# Patient Record
Sex: MALE | Race: BLACK OR AFRICAN AMERICAN | NOT HISPANIC OR LATINO | Employment: OTHER | URBAN - METROPOLITAN AREA
[De-identification: names, ages, dates, MRNs, and addresses within clinical notes are randomized per-mention and may not be internally consistent; named-entity substitution may affect disease eponyms.]

---

## 2017-04-05 ENCOUNTER — HOSPITAL ENCOUNTER (EMERGENCY)
Facility: HOSPITAL | Age: 57
Discharge: HOME | End: 2017-04-05
Attending: SURGERY
Payer: MEDICAID

## 2017-04-05 VITALS
WEIGHT: 185 LBS | BODY MASS INDEX: 27.4 KG/M2 | HEIGHT: 69 IN | RESPIRATION RATE: 18 BRPM | SYSTOLIC BLOOD PRESSURE: 170 MMHG | OXYGEN SATURATION: 98 % | DIASTOLIC BLOOD PRESSURE: 80 MMHG | HEART RATE: 70 BPM | TEMPERATURE: 98 F

## 2017-04-05 DIAGNOSIS — M25.561 RIGHT KNEE PAIN: ICD-10-CM

## 2017-04-05 LAB
ALBUMIN SERPL BCP-MCNC: 3.7 G/DL
ALP SERPL-CCNC: 123 U/L
ALT SERPL W/O P-5'-P-CCNC: 17 U/L
ANION GAP SERPL CALC-SCNC: 11 MMOL/L
AST SERPL-CCNC: 19 U/L
BASOPHILS # BLD AUTO: 0.02 K/UL
BASOPHILS NFR BLD: 0.3 %
BILIRUB SERPL-MCNC: 0.7 MG/DL
BUN SERPL-MCNC: 17 MG/DL
CALCIUM SERPL-MCNC: 8.9 MG/DL
CHLORIDE SERPL-SCNC: 103 MMOL/L
CO2 SERPL-SCNC: 23 MMOL/L
CREAT SERPL-MCNC: 1.2 MG/DL
DIFFERENTIAL METHOD: ABNORMAL
EOSINOPHIL # BLD AUTO: 0.2 K/UL
EOSINOPHIL NFR BLD: 2.7 %
ERYTHROCYTE [DISTWIDTH] IN BLOOD BY AUTOMATED COUNT: 14.9 %
EST. GFR  (AFRICAN AMERICAN): >60 ML/MIN/1.73 M^2
EST. GFR  (NON AFRICAN AMERICAN): >60 ML/MIN/1.73 M^2
GLUCOSE SERPL-MCNC: 97 MG/DL
HCT VFR BLD AUTO: 37.9 %
HGB BLD-MCNC: 12.6 G/DL
LYMPHOCYTES # BLD AUTO: 2.9 K/UL
LYMPHOCYTES NFR BLD: 41.3 %
MCH RBC QN AUTO: 28 PG
MCHC RBC AUTO-ENTMCNC: 33.2 %
MCV RBC AUTO: 84 FL
MONOCYTES # BLD AUTO: 1.1 K/UL
MONOCYTES NFR BLD: 15.2 %
NEUTROPHILS # BLD AUTO: 2.9 K/UL
NEUTROPHILS NFR BLD: 40.5 %
PLATELET # BLD AUTO: 176 K/UL
PMV BLD AUTO: 11 FL
POTASSIUM SERPL-SCNC: 4 MMOL/L
PROT SERPL-MCNC: 7.7 G/DL
RBC # BLD AUTO: 4.5 M/UL
SODIUM SERPL-SCNC: 137 MMOL/L
URATE SERPL-MCNC: 6.8 MG/DL
WBC # BLD AUTO: 7.12 K/UL

## 2017-04-05 PROCEDURE — 84550 ASSAY OF BLOOD/URIC ACID: CPT

## 2017-04-05 PROCEDURE — 99284 EMERGENCY DEPT VISIT MOD MDM: CPT | Mod: 25

## 2017-04-05 PROCEDURE — 80053 COMPREHEN METABOLIC PANEL: CPT

## 2017-04-05 PROCEDURE — 63600175 PHARM REV CODE 636 W HCPCS: Performed by: SURGERY

## 2017-04-05 PROCEDURE — 96372 THER/PROPH/DIAG INJ SC/IM: CPT

## 2017-04-05 PROCEDURE — 36415 COLL VENOUS BLD VENIPUNCTURE: CPT

## 2017-04-05 PROCEDURE — 85025 COMPLETE CBC W/AUTO DIFF WBC: CPT

## 2017-04-05 RX ORDER — KETOROLAC TROMETHAMINE 10 MG/1
5 TABLET, FILM COATED ORAL EVERY 6 HOURS PRN
Qty: 15 TABLET | Refills: 0 | Status: SHIPPED | OUTPATIENT
Start: 2017-04-05 | End: 2017-12-29 | Stop reason: ALTCHOICE

## 2017-04-05 RX ORDER — CYCLOBENZAPRINE HCL 10 MG
10 TABLET ORAL 3 TIMES DAILY PRN
Qty: 10 TABLET | Refills: 0 | Status: SHIPPED | OUTPATIENT
Start: 2017-04-05 | End: 2017-04-10

## 2017-04-05 RX ORDER — KETOROLAC TROMETHAMINE 30 MG/ML
60 INJECTION, SOLUTION INTRAMUSCULAR; INTRAVENOUS
Status: COMPLETED | OUTPATIENT
Start: 2017-04-05 | End: 2017-04-05

## 2017-04-05 RX ADMIN — KETOROLAC TROMETHAMINE 60 MG: 30 INJECTION, SOLUTION INTRAMUSCULAR at 10:04

## 2017-04-05 NOTE — ED AVS SNAPSHOT
[facility] [address] [place] [address]               Andrew Velazquez   2017  9:01 AM   ED    Description:  Male : 1960   Department:  [facility]           Your Care was Coordinated By:     Provider Role From To    Sid Springer MD Attending Provider 17 0857 --      Reason for Visit     Knee Pain     Joint Swelling           Diagnoses this Visit        Comments    Right knee pain           ED Disposition     ED Disposition Condition Comment    Discharge             To Do List           Follow-up Information     Follow up with Marisol Tejada MD. Schedule an appointment as soon as possible for a visit in 2 days.    Specialties:  Infectious Diseases, Obstetrics and Gynecology    Contact information:     [employer] [place] [address]  [phone]         These Medications        Disp Refills Start End    ketorolac (TORADOL) 10 mg tablet 15 tablet 0 2017     Take 0.5 tablets (5 mg total) by mouth every 6 (six) hours as needed for Pain. - Oral    Pharmacy: [facility] [address] Ph #: [phone]       cyclobenzaprine (FLEXERIL) 10 MG tablet 10 tablet 0 2017 4/10/2017    Take 1 tablet (10 mg total) by mouth 3 (three) times daily as needed for Muscle spasms. - Oral    Pharmacy: [facility] [address] Ph #: [phone]         [facility]     [facility] -  Assistance  Unless otherwise directed by your provider, please contact [facility], our nurse care line that is available for 24 assistance.     Registered nurses in the [facility] provide: appointment scheduling, clinical advisement, health education, and other advisory services.  Call: [phone] (toll free)               Medications           Message regarding Medications     Verify the changes  and/or additions to your medication regime listed below are the same as discussed with your clinician today.  If any of these changes or additions are incorrect, please notify your healthcare provider.        START taking these NEW medications        Refills    ketorolac (TORADOL) 10 mg tablet 0    Sig: Take 0.5 tablets (5 mg total) by mouth every 6 (six) hours as needed for Pain.    Class: Normal    Route: Oral    cyclobenzaprine (FLEXERIL) 10 MG tablet 0    Sig: Take 1 tablet (10 mg total) by mouth 3 (three) times daily as needed for Muscle spasms.    Class: Normal    Route: Oral      These medications were administered today        Dose Freq    ketorolac injection 60 mg 60 mg ED 1 Time    Sig: Inject 2 mLs (60 mg total) into the muscle ED 1 Time.    Class: Normal    Route: Intramuscular           Verify that the below list of medications is an accurate representation of the medications you are currently taking.  If none reported, the list may be blank. If incorrect, please contact your healthcare provider. Carry this list with you in case of emergency.           Current Medications     abacavir-dolutegravir-lamivud 600- mg Tab Take 1 tablet by mouth once daily. Stop norvir, ziagen, viread and reyataz    amlodipine (NORVASC) 10 MG tablet Take 1 tablet (10 mg total) by mouth once daily.    aspirin (ECOTRIN) 81 MG EC tablet Take 81 mg by mouth once daily.    b complex vitamins tablet Take 1 tablet by mouth once daily.    cyclobenzaprine (FLEXERIL) 10 MG tablet Take 1 tablet (10 mg total) by mouth 3 (three) times daily as needed for Muscle spasms.    ferrous sulfate 325 mg (65 mg iron) Tab tablet Take 325 mg by mouth once daily.    fluocinonide 0.05% (LIDEX) 0.05 % cream Apply 1 application topically 2 (two) times daily.    gabapentin (NEURONTIN) 300 MG capsule Take 1 capsule (300 mg total) by mouth 3 (three) times daily. Needs follow up appointment    hydrocodone-acetaminophen 7.5-325mg (NORCO) 7.5-325 mg per  "tablet Take 1 tablet by mouth every 6 (six) hours as needed. Do not fill until 1/28/167    ketorolac (TORADOL) 10 mg tablet Take 0.5 tablets (5 mg total) by mouth every 6 (six) hours as needed for Pain.    losartan-hydrochlorothiazide 50-12.5 mg (HYZAAR) 50-12.5 mg per tablet Take 1 tablet by mouth once daily. Stop HCTZ 12.5 mg    metoprolol succinate (TOPROL-XL) 50 MG 24 hr tablet Take 1 tablet (50 mg total) by mouth once daily.    pravastatin (PRAVACHOL) 40 MG tablet Take 1 tablet (40 mg total) by mouth every evening.    sulfamethoxazole-trimethoprim 800-160mg (BACTRIM DS) 800-160 mg Tab Take 1 tablet by mouth once daily.    triamcinolone acetonide 0.1% (KENALOG) 0.1 % cream Apply topically 2 (two) times daily.    zolpidem (AMBIEN) 10 mg Tab Take 1 tablet (10 mg total) by mouth nightly.           Clinical Reference Information           Your Vitals Were     BP Pulse Temp Resp Height Weight    185/100 (BP Location: Left arm, Patient Position: Sitting) 70 98 °F (36.7 °C) (Oral) 18 5' 9" (1.753 m) 83.9 kg (185 lb)    SpO2 BMI             99% 27.32 kg/m2         Allergies as of 4/5/2017        Reactions    Penicillins Hives    Sustiva [Efavirenz] Other (See Comments)    insomnia      Immunizations Administered on Date of Encounter - 4/5/2017     None      ED Micro, Lab, POCT     Start Ordered       Status Ordering Provider    04/05/17 0902 04/05/17 0901  Comprehensive metabolic panel  STAT      Final result     04/05/17 0902 04/05/17 0901  CBC auto differential  STAT      Final result     04/05/17 0902 04/05/17 0901  Uric acid  STAT      Final result       ED Imaging Orders     Start Ordered       Status Ordering Provider    04/05/17 0902 04/05/17 0901  X-Ray Knee 1 or 2 View Right  1 time imaging      Final result         Discharge Instructions         Knee Pain  Knee pain is very common. Its especially common in active people who put a lot of pressure on their knees, like runners. It affects women more often than " men.  Your kneecap (patella) is a thick, round bone. It covers and protects the front portion of your knee joint. It moves along a groove in your thighbone (femur) as part of the patellofemoral joint. A layer of cartilage surrounds the underside of your kneecap. This layer protects it from grinding against your femur.  When this cartilage softens and breaks down, it can cause knee pain. This is partly because of repetitive stress. The stress irritates the lining of the joint. This causes pain in the underlying bone.  What causes knee pain?  Many things can cause knee pain. You may have more than one cause. Some of these include:  · Overuse of the knee joint  · The kneecap doesnt line up with the tissue around it  · Damage to small nerves in the area  · Damage to the ligament-like structure that holds the kneecap in place (retinaculum)  · Breakdown of the bone under the cartilage  · Swelling in the soft tissues around the kneecap  · Injury  You might be more likely to have knee pain if you:  · Exercise a lot  · Recently increased the intensity of your workouts  · Have a body mass index (BMI) greater than 25  · Have poor alignment of your kneecap  · Walk with your feet turned overly outward or inward  · Have weakness in surrounding muscle groups (inner quad or hip adductor muscles)  · Have too much tightness in surrounding muscle groups (hamstrings or iliotibial band)  · Have a recent history of injury to the area  · Are female  Symptoms of knee pain  This type of knee pain is a dull, aching pain in the front of the knee in the area under and around the kneecap. This pain may start quickly or slowly. Your pain might be worse when you squat, run, or sit for a long time. You might also sometimes feel like your knee is giving out. You may have symptoms in one or both of your knees.  Diagnosing knee pain  Your health care provider will ask about your medical history and your symptoms. Be sure to describe any activities  that make your knee pain worse. He or she will look at your knee. This will include tests of your range of motion, strength, and areas of pain of your knee. Your knee alignment will be checked.  Your health care provider will need to rule out other causes of your knee pain, such as arthritis. You may need an imaging test, such as an X-ray or MRI.  Treatment for knee pain  Treatments that can help ease your symptoms may include:  · Avoiding activities for a while that make your pain worse, returning to activity over time  · Icing the outside of your knee when it causes you pain  · Taking over-the-counter pain medicine  · Wearing a knee brace or taping your knee to support it  · Wearing special shoe inserts to help keep your feet in the proper alignment  · Doing special exercises to stretch and strengthen the muscles around your hip and your knee  These steps help most people manage knee pain. But some cases of knee pain need to be treated with surgery. You may need surgery right away. Or you may need it later if other treatments dont work. Your health care provider may refer you to an orthopedic surgeon. He or she will talk with you about your choices.  Preventing knee pain  Losing weight and correcting excess muscle tightness or muscle weakness may help lower your risk.  In some cases, you can prevent knee pain. To help prevent a flare-up of knee pain, you do these things:  · Regularly do all the exercises your doctor or physical therapist advises  · Support your knee as advised by your doctor or physical therapist  · Increase training gradually, and ease up on training when needed  · Have an expert check your gait for running or other sporting activities  · Stretch properly before and after exercise  · Replace your running shoes regularly  · Lose excess weight     When to call your health care provider  Call your health care provider right away if:  · Your symptoms dont get better after a few weeks of  treatment  · You have any new symptoms   Date Last Reviewed: 3/19/2015  © 7396-7896 [employer]. [address] [place] [address], [place] [address]. All rights reserved. This information is not intended as a substitute for professional medical care. Always follow your healthcare professional's instructions.          Your Scheduled Appointments     May 04, 2017  6:50 AM CDT   Fasting Lab with [facility] [address] [place] [address]   [phone]            May 11, 2017  1:30 PM CDT   Established Patient Visit with MD KIANNA Ramirez - Infectious Diseases ([facility])    [address] [place] [address]   [phone]            Yon 15, 2017  7:25 AM CDT   Fasting Lab with [facility] [address] [place] [address]   [phone]            Jun 29, 2017 10:30 AM CDT   Established Patient Visit with MD KIANAN Ramirez - Infectious Diseases ([facility])    [address] [place] [address]   [phone]              Smoking Cessation     If you would like to quit smoking:   You may be eligible for free services if you are a [place] resident and started smoking cigarettes before September 1, 1988.  Call the Smoking Cessation Trust ([facility]) toll free at (157) 387-0740 or (643) 996-2268.   Call 1-800-QUIT-NOW if you do not meet the above criteria.   Contact us via email: [email or web address]   View our website for more information: [email or web address]         [facility] complies with applicable Federal civil rights laws and does not discriminate on the basis of race, color, national origin, age, disability, or sex.        Language Assistance Services     ATTENTION: Language assistance services are available, free of charge. Please call [phone].       ATENCIÓN: Si habla español, tiene a jackson disposición servicios gratuitos de asistencia lingüística. Llame al [phone].     CHÚ Ý: N?u b?n nói Ti?ng Vi?t, có các d?ch v? h? tr? ngôn ng? mi?n phí dành cho b?n. G?i s? [phone].

## 2017-04-05 NOTE — ED TRIAGE NOTES
Patient presents to the ER with c/o right knee pain and swelling which started yesterday.  Patient reports that he is having trouble bending his knee.

## 2017-04-05 NOTE — DISCHARGE INSTRUCTIONS
Knee Pain  Knee pain is very common. Its especially common in active people who put a lot of pressure on their knees, like runners. It affects women more often than men.  Your kneecap (patella) is a thick, round bone. It covers and protects the front portion of your knee joint. It moves along a groove in your thighbone (femur) as part of the patellofemoral joint. A layer of cartilage surrounds the underside of your kneecap. This layer protects it from grinding against your femur.  When this cartilage softens and breaks down, it can cause knee pain. This is partly because of repetitive stress. The stress irritates the lining of the joint. This causes pain in the underlying bone.  What causes knee pain?  Many things can cause knee pain. You may have more than one cause. Some of these include:  · Overuse of the knee joint  · The kneecap doesnt line up with the tissue around it  · Damage to small nerves in the area  · Damage to the ligament-like structure that holds the kneecap in place (retinaculum)  · Breakdown of the bone under the cartilage  · Swelling in the soft tissues around the kneecap  · Injury  You might be more likely to have knee pain if you:  · Exercise a lot  · Recently increased the intensity of your workouts  · Have a body mass index (BMI) greater than 25  · Have poor alignment of your kneecap  · Walk with your feet turned overly outward or inward  · Have weakness in surrounding muscle groups (inner quad or hip adductor muscles)  · Have too much tightness in surrounding muscle groups (hamstrings or iliotibial band)  · Have a recent history of injury to the area  · Are female  Symptoms of knee pain  This type of knee pain is a dull, aching pain in the front of the knee in the area under and around the kneecap. This pain may start quickly or slowly. Your pain might be worse when you squat, run, or sit for a long time. You might also sometimes feel like your knee is giving out. You may have symptoms in  one or both of your knees.  Diagnosing knee pain  Your health care provider will ask about your medical history and your symptoms. Be sure to describe any activities that make your knee pain worse. He or she will look at your knee. This will include tests of your range of motion, strength, and areas of pain of your knee. Your knee alignment will be checked.  Your health care provider will need to rule out other causes of your knee pain, such as arthritis. You may need an imaging test, such as an X-ray or MRI.  Treatment for knee pain  Treatments that can help ease your symptoms may include:  · Avoiding activities for a while that make your pain worse, returning to activity over time  · Icing the outside of your knee when it causes you pain  · Taking over-the-counter pain medicine  · Wearing a knee brace or taping your knee to support it  · Wearing special shoe inserts to help keep your feet in the proper alignment  · Doing special exercises to stretch and strengthen the muscles around your hip and your knee  These steps help most people manage knee pain. But some cases of knee pain need to be treated with surgery. You may need surgery right away. Or you may need it later if other treatments dont work. Your health care provider may refer you to an orthopedic surgeon. He or she will talk with you about your choices.  Preventing knee pain  Losing weight and correcting excess muscle tightness or muscle weakness may help lower your risk.  In some cases, you can prevent knee pain. To help prevent a flare-up of knee pain, you do these things:  · Regularly do all the exercises your doctor or physical therapist advises  · Support your knee as advised by your doctor or physical therapist  · Increase training gradually, and ease up on training when needed  · Have an expert check your gait for running or other sporting activities  · Stretch properly before and after exercise  · Replace your running shoes regularly  · Lose  excess weight     When to call your health care provider  Call your health care provider right away if:  · Your symptoms dont get better after a few weeks of treatment  · You have any new symptoms   Date Last Reviewed: 3/19/2015  © 3210-3021 [employer]. [address] [place] [address], [place] [address]. All rights reserved. This information is not intended as a substitute for professional medical care. Always follow your healthcare professional's instructions.

## 2017-04-05 NOTE — ED PROVIDER NOTES
[facility]                                        April 5, 2017                   Chief Complaint  56 y.o. male with Knee Pain (right) and Joint Swelling (right knee)    History of Present Illness  Andrew Velazquez presents to the emergency room with right knee pain today  Patient has long history of right knee pain with arthritis, no acute injury recently  Patient denies slip and fall, denies any right knee trauma, chronic pain daily  Patient on exam is arthritic changes the right knee with no gross deformity   Patient has no swelling or bruising, negative Homans sign on evaluation today  Patient has good distal pulses and capillary refill, is neurovascular intact here  His only complaint is right knee pain, denies any history of gout or RA on ROS    The history is provided by the patient    Past Medical History   -- Arthritis    -- Back pain    -- Chronic kidney disease    -- COPD (chronic obstructive pulmonary disease)    -- Esophageal reflux    -- HIV (human immunodeficiency virus infection)    -- HIV infection    -- Hypertension    -- Lumbago    -- Psoriasis    -- Skin disorder    -- Tobacco use      Surgical history: Finger surgery and tonsillectomy  ALLERGIES: Penicillin and Sustiva    Review of Systems and Physical Exam     Review of Systems  -- Constitution - no fever, denies fatigue, no weakness, no chills  -- Eyes - no tearing or redness, no visual disturbance  -- Ear, Nose - no tinnitus or earache, no nasal congestion or discharge  -- Mouth,Throat - no sore throat, no toothache, normal voice, normal swallowing  -- Respiratory - denies cough and congestion, no shortness of breath, no PAYNE  -- Cardiovascular - denies chest pain, no palpitations, denies claudication  -- Gastrointestinal - denies abdominal pain, nausea, vomiting, or diarrhea  -- Musculoskeletal - chronic right knee pain  -- Neurological - no headache, denies weakness or seizure; no LOC  -- Skin - denies pallor, rash, or  changes in skin. no hives or welts noted    Vital Signs  -- His blood pressure is 170/80 (abnormal) and his pulse is 70.   -- His respiration is 18 and oxygen saturation is 98%.      Physical Exam  -- Nursing note and vitals reviewed  -- Head: Atraumatic. Normocephalic. No obvious abnormality  -- Eyes: Pupils are equal and reactive to light. Normal conjunctiva and lids  -- Cardiac: Normal rate, regular rhythm and normal heart sounds  -- Pulmonary: Normal respiratory effort, breath sounds clear to auscultation  -- Abdominal: Soft, no tenderness. Normal bowel sounds. Normal liver edge  -- Musculoskeletal: Arthritic changes of the right knee with no gross deformity  -- Neurological: No focal deficits. Showed good interaction with staff  -- Vascular: Posterior tibial, dorsalis pedis and radial pulses 2+ bilaterally      Emergency Room Course     Treatment and Evaluation  -- The electrolytes drawn in the ER today were within normal limits   -- The CBC and uric acid drawn in the ER today was within normal limits   -- Degenerative changes noted on right knee x-ray, no acute fracture  -- IM 60 mg Toradol given today in the ER    Diagnosis  -- The encounter diagnosis was Right knee pain.    Disposition and Plan  -- Disposition: home  -- Condition: stable  -- Follow-up: Patient to follow up with Marisol Tejada MD in 1-2 days.  -- I advised the patient that we have found no life threatening condition today  -- At this time, I believe the patient is clinically stable for discharge.   -- The patient acknowledges that close follow up with a MD is required   -- Patient agrees to comply with all instruction and direction given in the ER    This note is dictated on Dragon Natural Speaking word recognition program.  There are word recognition mistakes that are occasionally missed on review.           Sid Springer MD  04/05/17 5612

## 2017-08-31 ENCOUNTER — HOSPITAL ENCOUNTER (EMERGENCY)
Facility: HOSPITAL | Age: 57
Discharge: HOME | End: 2017-08-31
Attending: EMERGENCY MEDICINE
Payer: MEDICAID

## 2017-08-31 VITALS
TEMPERATURE: 97 F | DIASTOLIC BLOOD PRESSURE: 106 MMHG | HEART RATE: 83 BPM | SYSTOLIC BLOOD PRESSURE: 171 MMHG | WEIGHT: 175 LBS | BODY MASS INDEX: 25.84 KG/M2 | RESPIRATION RATE: 18 BRPM

## 2017-08-31 DIAGNOSIS — J40 BRONCHITIS: Primary | ICD-10-CM

## 2017-08-31 PROCEDURE — 99283 EMERGENCY DEPT VISIT LOW MDM: CPT

## 2017-08-31 RX ORDER — PROMETHAZINE HYDROCHLORIDE AND CODEINE PHOSPHATE 6.25; 1 MG/5ML; MG/5ML
5 SOLUTION ORAL EVERY 4 HOURS PRN
Qty: 100 ML | Refills: 0 | Status: SHIPPED | OUTPATIENT
Start: 2017-08-31 | End: 2017-09-10

## 2017-08-31 RX ORDER — AZITHROMYCIN 250 MG/1
TABLET, FILM COATED ORAL
Qty: 6 TABLET | Refills: 0 | Status: SHIPPED | OUTPATIENT
Start: 2017-08-31 | End: 2017-09-22

## 2017-08-31 NOTE — ED PROVIDER NOTES
Encounter Date: 8/31/2017       History     Chief Complaint   Patient presents with    Cough     The history is provided by the patient.   Cough   This is a new problem. The current episode started several days ago. The problem has been unchanged. The cough is productive of sputum. There has been no fever. Pertinent negatives include no chest pain, no chills, no sweats, no weight loss, no ear congestion, no ear pain, no headaches, no rhinorrhea, no myalgias, no shortness of breath, no wheezing and no eye redness. He has tried cough syrup for the symptoms. He is a smoker. His past medical history is significant for COPD.     Review of patient's allergies indicates:   Allergen Reactions    Penicillins Hives    Sustiva [efavirenz] Other (See Comments)     insomnia     Past Medical History:   Diagnosis Date    Arthritis     Back pain     Chronic kidney disease     stage 3    COPD (chronic obstructive pulmonary disease)     Esophageal reflux     HIV (human immunodeficiency virus infection)     2006    HIV infection     Hypertension     Lumbago     Psoriasis     Skin disorder     Tobacco use      Past Surgical History:   Procedure Laterality Date    FINGER SURGERY      TONSILLECTOMY       Family History   Problem Relation Age of Onset    Arthritis Mother     Hypertension Mother     Hypertension Sister     Arthritis Sister     No Known Problems Father      Social History   Substance Use Topics    Smoking status: Current Every Day Smoker     Packs/day: 0.25     Years: 40.00     Types: Cigarettes    Smokeless tobacco: Never Used    Alcohol use 0.0 oz/week      Comment: occassional beer; pt drank beer this am     Review of Systems   Constitutional: Negative for chills, fever and weight loss.   HENT: Negative for ear pain and rhinorrhea.    Eyes: Negative for pain, discharge, redness and itching.   Respiratory: Positive for cough. Negative for shortness of breath and wheezing.    Cardiovascular:  Negative for chest pain, palpitations and leg swelling.   Gastrointestinal: Negative for abdominal pain, nausea and vomiting.   Genitourinary: Negative for flank pain and frequency.   Musculoskeletal: Negative for myalgias, neck pain and neck stiffness.   Skin: Negative for color change, pallor, rash and wound.   Neurological: Negative for tremors, syncope, weakness and headaches.       Physical Exam     Initial Vitals [08/31/17 0452]   BP Pulse Resp Temp SpO2   (!) 171/106 83 18 97.2 °F (36.2 °C) --      MAP       127.67         Physical Exam    Nursing note and vitals reviewed.  Constitutional: He appears well-developed and well-nourished. He is not diaphoretic. No distress.   HENT:   Head: Normocephalic and atraumatic.   Mouth/Throat: No oropharyngeal exudate.   Eyes: EOM are normal. Pupils are equal, round, and reactive to light. Right eye exhibits no discharge. Left eye exhibits no discharge.   Neck: Normal range of motion. Neck supple. No JVD present.   Pulmonary/Chest: Breath sounds normal. No stridor. No respiratory distress. He has no wheezes. He has no rhonchi. He has no rales.   Abdominal: Soft. Bowel sounds are normal. He exhibits no distension. There is no tenderness. There is no rebound and no guarding.   Musculoskeletal: Normal range of motion. He exhibits no edema or tenderness.   Neurological: He is alert and oriented to person, place, and time. No sensory deficit.         ED Course   Procedures  Labs Reviewed - No data to display                            ED Course     Clinical Impression:   The encounter diagnosis was Bronchitis.    Disposition:   Disposition: Discharged  Condition: Stable                        Dawood Martinez MD  08/31/17 3432

## 2017-09-22 ENCOUNTER — HOSPITAL ENCOUNTER (EMERGENCY)
Facility: HOSPITAL | Age: 57
Discharge: HOME | End: 2017-09-22
Attending: EMERGENCY MEDICINE
Payer: MEDICAID

## 2017-09-22 VITALS
SYSTOLIC BLOOD PRESSURE: 155 MMHG | BODY MASS INDEX: 23.63 KG/M2 | WEIGHT: 160 LBS | RESPIRATION RATE: 18 BRPM | DIASTOLIC BLOOD PRESSURE: 80 MMHG | OXYGEN SATURATION: 98 % | TEMPERATURE: 98 F | HEART RATE: 70 BPM

## 2017-09-22 DIAGNOSIS — R05.9 COUGH: ICD-10-CM

## 2017-09-22 DIAGNOSIS — J40 BRONCHITIS: Primary | ICD-10-CM

## 2017-09-22 PROCEDURE — 99283 EMERGENCY DEPT VISIT LOW MDM: CPT

## 2017-09-22 RX ORDER — HYDROCODONE BITARTRATE AND HOMATROPINE METHYLBROMIDE ORAL SOLUTION 5; 1.5 MG/5ML; MG/5ML
5 LIQUID ORAL EVERY 4 HOURS PRN
Qty: 120 ML | Refills: 0 | Status: SHIPPED | OUTPATIENT
Start: 2017-09-22 | End: 2017-10-02

## 2017-09-22 RX ORDER — AZITHROMYCIN 250 MG/1
TABLET, FILM COATED ORAL
Qty: 6 TABLET | Refills: 0 | Status: SHIPPED | OUTPATIENT
Start: 2017-09-22 | End: 2017-09-29 | Stop reason: ALTCHOICE

## 2017-09-22 NOTE — DISCHARGE INSTRUCTIONS
Return to ED if worsening symptoms, chest pain, shortness of breath, fever, chills, coughing up blood or other concerns.

## 2017-09-22 NOTE — ED NOTES
Received verbal report from VIKASH Echeverria.  Pt in ED room ED 06/ED 06 lying in stretcher, HOB 30 degrees. Stretcher is in low, locked position, side rails up x2. The patient is awake, alert and cooperative with a calm affect, patient is aware of environment. Airway is open and patent, respirations are spontaneous, normal respiratory effort and rate noted, skin warm and dry, full ROM in all extremities, appearance: NAD noted, resting comfortably.Call bell within reach of pt, pt instructed on use, pt verbalizes understanding of call bell use. Hourly rounding explained and white board updated.

## 2017-09-22 NOTE — ED PROVIDER NOTES
Encounter Date: 9/22/2017       History     Chief Complaint   Patient presents with    Cough     This is a 56-year-old male who presents with cough for the past few days.  He denies fever or chills or shortness of breath.  He reports occasional chest pain with his cough only.          Review of patient's allergies indicates:   Allergen Reactions    Penicillins Hives    Sustiva [efavirenz] Other (See Comments)     insomnia     Past Medical History:   Diagnosis Date    Arthritis     Back pain     Chronic kidney disease     stage 3    COPD (chronic obstructive pulmonary disease)     Esophageal reflux     HIV (human immunodeficiency virus infection)     2006    HIV infection     Hypertension     Lumbago     Psoriasis     Skin disorder     Tobacco use      Past Surgical History:   Procedure Laterality Date    FINGER SURGERY      TONSILLECTOMY       Family History   Problem Relation Age of Onset    Arthritis Mother     Hypertension Mother     Hypertension Sister     Arthritis Sister     No Known Problems Father      Social History   Substance Use Topics    Smoking status: Current Every Day Smoker     Packs/day: 0.25     Years: 40.00     Types: Cigarettes    Smokeless tobacco: Never Used    Alcohol use 0.0 oz/week      Comment: occassional beer; pt drank beer this am     Review of Systems   All other systems reviewed and are negative.      Physical Exam     Initial Vitals [09/22/17 1010]   BP Pulse Resp Temp SpO2   (!) 164/90 74 18 98.1 °F (36.7 °C) --      MAP       114.67         Physical Exam    Nursing note and vitals reviewed.  Constitutional: He appears well-developed and well-nourished. He is not diaphoretic. No distress.   HENT:   Head: Normocephalic.   Right Ear: External ear normal.   Left Ear: External ear normal.   Nose: Nose normal.   Mouth/Throat: Oropharynx is clear and moist.   Eyes: Conjunctivae and EOM are normal. Pupils are equal, round, and reactive to light. Right eye exhibits  no discharge. Left eye exhibits no discharge.   Neck: Normal range of motion. Neck supple.   Cardiovascular: Normal rate, regular rhythm, normal heart sounds and intact distal pulses.   Pulmonary/Chest: Breath sounds normal. No respiratory distress. He has no wheezes. He has no rales.   Abdominal: Soft. Bowel sounds are normal. He exhibits no distension and no mass. There is no tenderness. There is no rebound and no guarding.   Musculoskeletal: Normal range of motion.   Neurological: He is alert and oriented to person, place, and time.   Skin: Skin is warm.   Psychiatric: He has a normal mood and affect. His behavior is normal. Thought content normal.         ED Course   Procedures  Labs Reviewed - No data to display       X-Rays:   Independently Interpreted Readings:   Chest X-Ray: Normal heart size.  No infiltrates.  No acute abnormalities.     Medical Decision Making:   Initial Assessment:   This is a 56-year-old male with cough and congestion for the past 2 days.  There is no evidence of pneumonia on his chest x-ray.  He is well-appearing and nontoxic.  I'll treat him with Zithromax and Hycodan.                   ED Course      Clinical Impression:   The primary encounter diagnosis was Bronchitis. A diagnosis of Cough was also pertinent to this visit.                           Carlos Gomez MD  09/22/17 0348

## 2018-03-04 ENCOUNTER — HOSPITAL ENCOUNTER (EMERGENCY)
Facility: HOSPITAL | Age: 58
Discharge: HOME | End: 2018-03-04
Attending: EMERGENCY MEDICINE
Payer: MEDICAID

## 2018-03-04 VITALS
SYSTOLIC BLOOD PRESSURE: 178 MMHG | OXYGEN SATURATION: 97 % | TEMPERATURE: 98 F | RESPIRATION RATE: 16 BRPM | HEART RATE: 64 BPM | DIASTOLIC BLOOD PRESSURE: 97 MMHG | WEIGHT: 180 LBS | BODY MASS INDEX: 26.58 KG/M2

## 2018-03-04 DIAGNOSIS — M65.342 TRIGGER RING FINGER OF LEFT HAND: Primary | ICD-10-CM

## 2018-03-04 PROCEDURE — 99282 EMERGENCY DEPT VISIT SF MDM: CPT

## 2018-03-04 NOTE — ED PROVIDER NOTES
"[facility]                                                  Chief Complaint  57 y.o. male with General Illness (lt 4th finger "locks up.")    History of Present Illness  Andrew Velazquez presents to the emergency room with complaints that his left fourth finger is occasionally "locking up ".  Patient reports he had no trauma.  He reports been doing this for several days.  Has not noticed any symptoms before then.  He does not have any neurologic symptoms.  He has not tried any medication for symptoms.      Past Medical History:   Diagnosis Date    Arthritis     Back pain     Chronic kidney disease     stage 3    COPD (chronic obstructive pulmonary disease)     Esophageal reflux     HIV (human immunodeficiency virus infection)     2006    HIV infection     Hypertension     Lumbago     Psoriasis     Skin disorder     Tobacco use      Past Surgical History:   Procedure Laterality Date    FINGER SURGERY      TONSILLECTOMY        Review of patient's allergies indicates:   Allergen Reactions    Penicillins Hives    Sustiva [efavirenz] Other (See Comments)     insomnia        Review of Systems and Physical Exam     Review of Systems  -- Constitution - no fever, denies fatigue, no weakness, no chills  -- Eyes - no tearing or redness, no visual disturbance  -- Ear, Nose - no tinnitus or earache, no nasal congestion or discharge  -- Mouth,Throat - no sore throat, no toothache, normal voice, normal swallowing  -- Respiratory - denies cough and congestion, no shortness of breath, no wheezing  -- Cardiovascular - denies chest pain, no palpitations, denies claudication  -- Gastrointestinal - denies abdominal pain, nausea, vomiting, or diarrhea  -- Genitourinary - no dysuria, no denies flank pain, no hematuria or frequency   -- Musculoskeletal - denies back pain, negative for myalgias and arthralgias  reports fourth left finger locking up  -- Neurological - no headache, denies weakness or " seizure; no LOC  -- Skin - denies pallor, rash, or changes in skin. no hives or welts noted    Vital Signs   weight is 81.6 kg (180 lb). His oral temperature is 98 °F (36.7 °C). His blood pressure is 178/97 (abnormal) and his pulse is 64. His respiration is 16 and oxygen saturation is 97%.      Physical Exam  -- Nursing note and vitals reviewed  -- Constitutional: Appears well-developed and well-nourished  -- Head: Atraumatic. Normocephalic. No obvious abnormality  -- Eyes: Pupils are equal and reactive to light. Normal conjunctiva and lids  -- Nose: Nose normal in appearance, nares grossly normal. No discharge  -- Throat: Mucous membranes moist, pharynx normal, normal tonsils. No lesions   -- Ears: External ears and TM normal bilaterally. Normal hearing and no drainage  -- Neck: Normal range of motion. Neck supple. No masses, trachea midline  -- Cardiac: Normal rate, regular rhythm and normal heart sounds  -- Pulmonary: Normal respiratory effort, breath sounds clear to auscultation  -- Abdominal: Soft, no tenderness. Normal bowel sounds. Normal liver edge  -- Musculoskeletal: Normal range of motion, no effusions. Joints stable left fourth digit consistent with trigger finger, palmar palpable mass  -- Neurological: No focal deficits. Showed good interaction with staff  -- Vascular: Posterior tibial, dorsalis pedis and radial pulses 2+ bilaterally    -- Lymphatics: No cervical or peripheral lymphadenopathy. No edema noted  -- Skin: Warm and dry. No evidence of rash or cellulitis  -- Psychiatric: Normal mood and affect. Bedside behavior is appropriate    Emergency Room Course     Treatment and Evaluation  1.  Physical exam consistent with trigger finger  2.  Follow up care first available         Diagnosis  -- Fourth digit trigger finger    Disposition and Plan  -- Disposition: home  -- Condition: stable  -- Follow-up: Patient to follow up with Marisol Tejada MD in 1-2 days.  -- I advised the patient that we have  found no life threatening condition today  -- At this time, I believe the patient is clinically stable for discharge.   -- The patient acknowledges that close follow up with a MD is required   -- Patient agrees to comply with all instruction and direction given in the ER  -- Patient counseled on strict return precautions as discussed       Mica Rojas MD  03/04/18 0953

## 2018-03-04 NOTE — ED TRIAGE NOTES
"States his lt 4th finger began "locking up" this week.  Denies any trauma.  No deformity noted.   "

## 2018-05-06 ENCOUNTER — HOSPITAL ENCOUNTER (EMERGENCY)
Facility: HOSPITAL | Age: 58
Discharge: HOME | End: 2018-05-06
Payer: MEDICAID

## 2018-05-06 VITALS
RESPIRATION RATE: 18 BRPM | BODY MASS INDEX: 27.32 KG/M2 | HEART RATE: 70 BPM | TEMPERATURE: 97 F | DIASTOLIC BLOOD PRESSURE: 103 MMHG | WEIGHT: 185 LBS | SYSTOLIC BLOOD PRESSURE: 176 MMHG

## 2018-05-06 DIAGNOSIS — M65.342 TRIGGER FINGER, LEFT RING FINGER: Primary | ICD-10-CM

## 2018-05-06 PROCEDURE — 25000003 PHARM REV CODE 250: Performed by: SURGERY

## 2018-05-06 PROCEDURE — 99283 EMERGENCY DEPT VISIT LOW MDM: CPT

## 2018-05-06 RX ORDER — TRAMADOL HYDROCHLORIDE 50 MG/1
50 TABLET ORAL
Status: COMPLETED | OUTPATIENT
Start: 2018-05-06 | End: 2018-05-06

## 2018-05-06 RX ORDER — IBUPROFEN 800 MG/1
800 TABLET ORAL
Status: COMPLETED | OUTPATIENT
Start: 2018-05-06 | End: 2018-05-06

## 2018-05-06 RX ADMIN — TRAMADOL HYDROCHLORIDE 50 MG: 50 TABLET, FILM COATED ORAL at 09:05

## 2018-05-06 RX ADMIN — IBUPROFEN 800 MG: 800 TABLET ORAL at 09:05

## 2018-05-06 NOTE — ED PROVIDER NOTES
Encounter Date: 5/6/2018       History     Chief Complaint   Patient presents with    Hand Pain     Patient is a 57-year-old black male with previous history of left trigger finger.  He was seen by primary care in follow-up after his last ED visit for this, apparently was referred to undergo surgical release and direction, but failed to pursue this.  Last night he had recurrence of locking up of the finger, which is now released.          Review of patient's allergies indicates:   Allergen Reactions    Penicillins Hives    Sustiva [efavirenz] Other (See Comments)     insomnia     Past Medical History:   Diagnosis Date    Arthritis     Back pain     Chronic kidney disease     stage 3    COPD (chronic obstructive pulmonary disease)     Esophageal reflux     HIV (human immunodeficiency virus infection)     2006    HIV infection     Hypertension     Lumbago     Psoriasis     Skin disorder     Tobacco use      Past Surgical History:   Procedure Laterality Date    FINGER SURGERY      TONSILLECTOMY       Family History   Problem Relation Age of Onset    Arthritis Mother     Hypertension Mother     Hypertension Sister     Arthritis Sister     No Known Problems Father      Social History   Substance Use Topics    Smoking status: Current Every Day Smoker     Packs/day: 0.25     Years: 40.00     Types: Cigarettes    Smokeless tobacco: Never Used    Alcohol use 0.0 oz/week      Comment: occassional beer; pt drank beer this am     Review of Systems   Constitutional: Negative.    HENT: Negative.    Eyes: Negative.    Respiratory: Negative.    Cardiovascular: Negative.    Gastrointestinal: Negative.    Musculoskeletal: Positive for arthralgias.   All other systems reviewed and are negative.      Physical Exam     Initial Vitals [05/06/18 0916]   BP Pulse Resp Temp SpO2   (!) 176/103 70 18 96.7 °F (35.9 °C) --      MAP       127.33         Physical Exam    Constitutional: He appears well-developed and  well-nourished.   HENT:   Head: Normocephalic and atraumatic.   Eyes: EOM are normal. Pupils are equal, round, and reactive to light.   Neck: Normal range of motion. Neck supple.   Cardiovascular: Normal rate.   Pulmonary/Chest: Breath sounds normal.   Abdominal: Soft.   Musculoskeletal:   Left 4th finger with minimal tenderness to flexion/extension MCP joint.   Neurological: He is alert.   Skin: Skin is warm.         ED Course   Procedures  Labs Reviewed - No data to display                               Clinical Impression:   The encounter diagnosis was Trigger finger, left ring finger.    Disposition:   Disposition: Discharged  Condition: Stable                        Heri Curran Jr., MD  05/06/18 0931

## 2018-07-10 ENCOUNTER — HOSPITAL ENCOUNTER (EMERGENCY)
Facility: HOSPITAL | Age: 58
Discharge: HOME | End: 2018-07-10
Attending: SURGERY
Payer: MEDICAID

## 2018-07-10 VITALS
TEMPERATURE: 97 F | WEIGHT: 180 LBS | SYSTOLIC BLOOD PRESSURE: 199 MMHG | HEART RATE: 71 BPM | OXYGEN SATURATION: 99 % | DIASTOLIC BLOOD PRESSURE: 98 MMHG | RESPIRATION RATE: 15 BRPM | BODY MASS INDEX: 26.58 KG/M2

## 2018-07-10 DIAGNOSIS — J40 BRONCHITIS: Primary | ICD-10-CM

## 2018-07-10 PROCEDURE — 99284 EMERGENCY DEPT VISIT MOD MDM: CPT | Mod: 25

## 2018-07-10 PROCEDURE — 25000242 PHARM REV CODE 250 ALT 637 W/ HCPCS: Performed by: SURGERY

## 2018-07-10 PROCEDURE — 63600175 PHARM REV CODE 636 W HCPCS: Performed by: SURGERY

## 2018-07-10 PROCEDURE — 99900035 HC TECH TIME PER 15 MIN (STAT)

## 2018-07-10 PROCEDURE — 96372 THER/PROPH/DIAG INJ SC/IM: CPT

## 2018-07-10 RX ORDER — METHYLPREDNISOLONE 4 MG/1
TABLET ORAL
Qty: 1 PACKAGE | Refills: 0 | Status: SHIPPED | OUTPATIENT
Start: 2018-07-10 | End: 2018-09-02

## 2018-07-10 RX ORDER — IPRATROPIUM BROMIDE AND ALBUTEROL SULFATE 2.5; .5 MG/3ML; MG/3ML
3 SOLUTION RESPIRATORY (INHALATION)
Status: COMPLETED | OUTPATIENT
Start: 2018-07-10 | End: 2018-07-10

## 2018-07-10 RX ORDER — LEVOFLOXACIN 500 MG/1
500 TABLET, FILM COATED ORAL DAILY
Qty: 7 TABLET | Refills: 0 | Status: SHIPPED | OUTPATIENT
Start: 2018-07-10 | End: 2018-07-17

## 2018-07-10 RX ORDER — PROMETHAZINE HYDROCHLORIDE AND DEXTROMETHORPHAN HYDROBROMIDE 6.25; 15 MG/5ML; MG/5ML
5 SYRUP ORAL EVERY 6 HOURS PRN
Qty: 118 ML | Refills: 0 | Status: SHIPPED | OUTPATIENT
Start: 2018-07-10 | End: 2018-07-20

## 2018-07-10 RX ORDER — METHYLPREDNISOLONE SOD SUCC 125 MG
125 VIAL (EA) INJECTION
Status: COMPLETED | OUTPATIENT
Start: 2018-07-10 | End: 2018-07-10

## 2018-07-10 RX ADMIN — METHYLPREDNISOLONE SODIUM SUCCINATE 125 MG: 125 INJECTION, POWDER, FOR SOLUTION INTRAMUSCULAR; INTRAVENOUS at 09:07

## 2018-07-10 RX ADMIN — IPRATROPIUM BROMIDE AND ALBUTEROL SULFATE 3 ML: .5; 3 SOLUTION RESPIRATORY (INHALATION) at 09:07

## 2018-07-10 NOTE — ED PROVIDER NOTES
[facility]                                                 Chief Complaint  57 y.o. male with Cough and Nasal Congestion    History of Present Illness  Andrew Velazquez presents to the emergency room with nasal congestion today  Patient has had cold symptoms this week, long-time smoking history noted  Patient has HIV and states he is compliant with all medication for years now  Patient on exam has clear lung sounds in all fields with no active wheezing  Patient denies any shortness of breath or sputum production dry cough now  Patient is a temperature 96.7° Fahrenheit and a 99% room air oxygenation     The history is provided by the patient   device was not used during this ER visit    Past Medical History   -- Arthritis    -- Chronic kidney disease    -- COPD (chronic obstructive pulmonary disease)    -- Esophageal reflux    -- HIV (human immunodeficiency virus infection)    -- HIV infection    -- Hypertension    -- Lumbago    -- Psoriasis    -- Skin disorder    -- Tobacco use      Surgeries: Finger surgery and tonsillectomy  Allergies: Penicillin and Sustiva    Review of Systems and Physical Exam      Review of Systems  -- Constitution - no fever, denies fatigue, no weakness, no chills  -- Eyes - no tearing or redness, no visual disturbance  -- Ear, Nose - sneezing, nasal congestion and clear discharge   -- Mouth,Throat - no sore throat, no toothache, normal voice, normal swallowing  -- Respiratory - cough and congestion, no shortness of breath, no PAYNE  -- Cardiovascular - denies chest pain, no palpitations, denies claudication  -- Gastrointestinal - denies abdominal pain, nausea, vomiting, or diarrhea  -- Genitourinary - no dysuria, denies flank pain, no hematuria, no STD risk  -- Musculoskeletal - denies back pain, negative for myalgias and arthralgias   -- Neurological - no headache, denies weakness or seizure; no LOC  -- Skin - denies pallor, rash, or changes in skin. no  hives or welts noted  -- Psychiatric - Denies SI or HI, no psychosis or fractured thought noted     BP (!) 199/98   Pulse 71   Temp 96.7 °F (35.9 °C)   Resp 15      Physical Exam  -- Nursing note and vitals reviewed  -- Head: Atraumatic. Normocephalic. No obvious abnormality  -- Eyes: Pupils are equal and reactive to light. Normal conjunctiva and lids  -- Nose: Nose normal in appearance, nares grossly normal. No discharge  -- Throat: Mucous membranes moist, pharynx normal, normal tonsils. No lesions   -- Ears: External ears and TM normal bilaterally. Normal hearing and no drainage  -- Neck: Normal range of motion. Neck supple. No masses, trachea midline  -- Cardiac: Normal rate, regular rhythm and normal heart sounds  -- Pulmonary: Normal respiratory effort, breath sounds clear to auscultation  -- Abdominal: Soft, no tenderness. Normal bowel sounds. Normal liver edge  -- Musculoskeletal: Normal range of motion, no effusions. Joints stable   -- Neurological: No focal deficits. Showed good interaction with staff  -- Vascular: Posterior tibial, dorsalis pedis and radial pulses 2+ bilaterally      Emergency Room Course      Treatment and Evaluation  -- Chest x-ray showed no infiltrate and showed no acute pathology      Medications Given  methylPREDNISolone sodium succinate injection 125 mg (125 mg Intramuscular Given 7/10/18 0922)   albuterol-ipratropium 2.5 mg-0.5 mg/3 mL nebulizer solution 3 mL (3 mLs Nebulization Given 7/10/18 0916)     Diagnosis  -- The encounter diagnosis was Bronchitis.    Disposition and Plan  -- Disposition: home  -- Condition: stable  -- Follow-up: Patient to follow up with Marisol Tejada MD in 1-2 days.  -- I advised the patient that we have found no life threatening condition today  -- At this time, I believe the patient is clinically stable for discharge.   -- The patient acknowledges that close follow up with a MD is required   -- Patient agrees to comply with all instruction and  direction given in the ER    This note is dictated on Dragon Natural Speaking word recognition program.  There are word recognition mistakes that are occasionally missed on review.              Sid Springer MD  07/10/18 1018

## 2018-09-02 ENCOUNTER — HOSPITAL ENCOUNTER (EMERGENCY)
Facility: HOSPITAL | Age: 58
Discharge: HOME | End: 2018-09-02
Attending: SURGERY
Payer: MEDICAID

## 2018-09-02 VITALS
TEMPERATURE: 98 F | BODY MASS INDEX: 25.84 KG/M2 | SYSTOLIC BLOOD PRESSURE: 168 MMHG | WEIGHT: 175 LBS | HEART RATE: 76 BPM | DIASTOLIC BLOOD PRESSURE: 88 MMHG | RESPIRATION RATE: 16 BRPM | OXYGEN SATURATION: 97 %

## 2018-09-02 DIAGNOSIS — J40 BRONCHITIS: Primary | ICD-10-CM

## 2018-09-02 PROCEDURE — 94640 AIRWAY INHALATION TREATMENT: CPT

## 2018-09-02 PROCEDURE — 96372 THER/PROPH/DIAG INJ SC/IM: CPT

## 2018-09-02 PROCEDURE — 99284 EMERGENCY DEPT VISIT MOD MDM: CPT | Mod: 25

## 2018-09-02 PROCEDURE — 63600175 PHARM REV CODE 636 W HCPCS: Performed by: SURGERY

## 2018-09-02 PROCEDURE — 25000242 PHARM REV CODE 250 ALT 637 W/ HCPCS: Performed by: SURGERY

## 2018-09-02 RX ORDER — METHYLPREDNISOLONE SOD SUCC 125 MG
125 VIAL (EA) INJECTION
Status: COMPLETED | OUTPATIENT
Start: 2018-09-02 | End: 2018-09-02

## 2018-09-02 RX ORDER — ALBUTEROL SULFATE 90 UG/1
1-2 AEROSOL, METERED RESPIRATORY (INHALATION) EVERY 6 HOURS PRN
Qty: 1 INHALER | Refills: 0 | Status: SHIPPED | OUTPATIENT
Start: 2018-09-02 | End: 2019-12-13 | Stop reason: SDUPTHER

## 2018-09-02 RX ORDER — PROMETHAZINE HYDROCHLORIDE AND DEXTROMETHORPHAN HYDROBROMIDE 6.25; 15 MG/5ML; MG/5ML
5 SYRUP ORAL EVERY 6 HOURS PRN
Qty: 118 ML | Refills: 0 | Status: SHIPPED | OUTPATIENT
Start: 2018-09-02 | End: 2018-09-12

## 2018-09-02 RX ORDER — IPRATROPIUM BROMIDE AND ALBUTEROL SULFATE 2.5; .5 MG/3ML; MG/3ML
3 SOLUTION RESPIRATORY (INHALATION)
Status: COMPLETED | OUTPATIENT
Start: 2018-09-02 | End: 2018-09-02

## 2018-09-02 RX ORDER — METHYLPREDNISOLONE 4 MG/1
TABLET ORAL
Qty: 1 PACKAGE | Refills: 0 | Status: SHIPPED | OUTPATIENT
Start: 2018-09-02 | End: 2018-10-18 | Stop reason: ALTCHOICE

## 2018-09-02 RX ORDER — LEVOFLOXACIN 500 MG/1
500 TABLET, FILM COATED ORAL DAILY
Qty: 7 TABLET | Refills: 0 | Status: SHIPPED | OUTPATIENT
Start: 2018-09-02 | End: 2018-09-09

## 2018-09-02 RX ADMIN — METHYLPREDNISOLONE SODIUM SUCCINATE 125 MG: 125 INJECTION, POWDER, FOR SOLUTION INTRAMUSCULAR; INTRAVENOUS at 09:09

## 2018-09-02 RX ADMIN — IPRATROPIUM BROMIDE AND ALBUTEROL SULFATE 3 ML: .5; 3 SOLUTION RESPIRATORY (INHALATION) at 09:09

## 2018-09-02 NOTE — ED PROVIDER NOTES
[facility]                                                 Chief Complaint  57 y.o. male with URI    History of Present Illness  Andrew Velazquez presents to the emergency room with cough and congestion  Patient states he has had cough congestion this weekend, 97% oxygenation  Patient is a long-time smoker, has a long history of COPD per ER interview  Patient on exam has mild rhonchi bilaterally with no active wheezing this a.m.  Patient states he has no intention of quitting smoking, denies any SOB today  Patient states he has a dry hacking cough, he denies any sputum production    The history is provided by the patient   device was not used during this ER visit     Past Medical History   -- Arthritis     -- Chronic kidney disease     -- COPD (chronic obstructive pulmonary disease)     -- Esophageal reflux     -- HIV (human immunodeficiency virus infection)     -- HIV infection     -- Hypertension     -- Lumbago     -- Psoriasis     -- Skin disorder     -- Tobacco use        Surgeries: Finger surgery and tonsillectomy  Allergies: Penicillin and Sustiva    Review of Systems and Physical Exam      Review of Systems  -- Constitution - no fever, denies fatigue, no weakness, no chills  -- Eyes - no tearing or redness, no visual disturbance  -- Ear, Nose - no tinnitus or earache, no nasal congestion or discharge  -- Mouth,Throat - no sore throat, no toothache, normal voice, normal swallowing  -- Respiratory - cough and congestion, no shortness of breath, no PAYNE  -- Cardiovascular - denies chest pain, no palpitations, denies claudication  -- Gastrointestinal - denies abdominal pain, nausea, vomiting, or diarrhea  -- Musculoskeletal - denies back pain, negative for myalgias and arthralgias   -- Neurological - no headache, denies weakness or seizure; no LOC  -- Skin - denies pallor, rash, or changes in skin. no hives or welts noted  -- Psychiatric - Denies SI or HI, no psychosis or  fractured thought noted     Vital Signs  His oral temperature is 97.8 °F (36.6 °C).   His blood pressure is 178/96 and his pulse is 80.   His respiration is 16 and oxygen saturation is 97%.     Physical Exam  -- Nursing note and vitals reviewed  -- Constitutional: Appears well-developed and well-nourished  -- Head: Atraumatic. Normocephalic. No obvious abnormality  -- Eyes: Pupils are equal and reactive to light. Normal conjunctiva and lids  -- Nose: Nose normal in appearance, nares grossly normal. No discharge  -- Throat: Mucous membranes moist, pharynx normal, normal tonsils. No lesions   -- Ears: External ears and TM normal bilaterally. Normal hearing and no drainage  -- Neck: Normal range of motion. Neck supple. No masses, trachea midline  -- Cardiac: Normal rate, regular rhythm and normal heart sounds  -- Pulmonary: faint rhonchi at the bilateral bases with no active wheezing   -- Abdominal: Soft, no tenderness. Normal bowel sounds. Normal liver edge  -- Musculoskeletal: Normal range of motion, no effusions. Joints stable   -- Neurological: No focal deficits. Showed good interaction with staff  -- Skin: Warm and dry. No evidence of rash or cellulitis    Emergency Room Course      Treatment and Evaluation  -- Chest x-ray showed no infiltrate and showed no acute pathology  --  mg Solumedrol given today in the ER  -- Duoneb breathing treatment given today in the ER    Diagnosis  -- The encounter diagnosis was Bronchitis.    Disposition and Plan  -- Disposition: home  -- Condition: stable  -- Follow-up: Patient to follow up with Marisol Tejada MD in 1-2 days.  -- I advised the patient that we have found no life threatening condition today  -- At this time, I believe the patient is clinically stable for discharge.   -- The patient acknowledges that close follow up with a MD is required   -- Patient agrees to comply with all instruction and direction given in the ER    This note is dictated on Adin Duvall  Speaking word recognition program.  There are word recognition mistakes that are occasionally missed on review.          Sid Springer MD  09/02/18 0907

## 2018-10-05 ENCOUNTER — HOSPITAL ENCOUNTER (EMERGENCY)
Facility: HOSPITAL | Age: 58
Discharge: HOME | End: 2018-10-05
Attending: EMERGENCY MEDICINE
Payer: MEDICAID

## 2018-10-05 VITALS
TEMPERATURE: 99 F | RESPIRATION RATE: 16 BRPM | HEART RATE: 72 BPM | BODY MASS INDEX: 25.77 KG/M2 | WEIGHT: 180 LBS | OXYGEN SATURATION: 97 % | DIASTOLIC BLOOD PRESSURE: 95 MMHG | HEIGHT: 70 IN | SYSTOLIC BLOOD PRESSURE: 173 MMHG

## 2018-10-05 DIAGNOSIS — M17.11 OSTEOARTHRITIS OF RIGHT KNEE, UNSPECIFIED OSTEOARTHRITIS TYPE: Primary | ICD-10-CM

## 2018-10-05 PROCEDURE — 96372 THER/PROPH/DIAG INJ SC/IM: CPT

## 2018-10-05 PROCEDURE — 99284 EMERGENCY DEPT VISIT MOD MDM: CPT | Mod: 25

## 2018-10-05 PROCEDURE — 63600175 PHARM REV CODE 636 W HCPCS: Performed by: EMERGENCY MEDICINE

## 2018-10-05 RX ORDER — NAPROXEN 500 MG/1
500 TABLET ORAL 2 TIMES DAILY WITH MEALS
Qty: 30 TABLET | Refills: 0 | Status: SHIPPED | OUTPATIENT
Start: 2018-10-05 | End: 2019-04-03 | Stop reason: SDUPTHER

## 2018-10-05 RX ORDER — DEXAMETHASONE SODIUM PHOSPHATE 4 MG/ML
8 INJECTION, SOLUTION INTRA-ARTICULAR; INTRALESIONAL; INTRAMUSCULAR; INTRAVENOUS; SOFT TISSUE
Status: COMPLETED | OUTPATIENT
Start: 2018-10-05 | End: 2018-10-05

## 2018-10-05 RX ORDER — KETOROLAC TROMETHAMINE 30 MG/ML
30 INJECTION, SOLUTION INTRAMUSCULAR; INTRAVENOUS
Status: COMPLETED | OUTPATIENT
Start: 2018-10-05 | End: 2018-10-05

## 2018-10-05 RX ADMIN — DEXAMETHASONE SODIUM PHOSPHATE 8 MG: 4 INJECTION, SOLUTION INTRAMUSCULAR; INTRAVENOUS at 02:10

## 2018-10-05 RX ADMIN — KETOROLAC TROMETHAMINE 30 MG: 30 INJECTION, SOLUTION INTRAMUSCULAR at 02:10
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
## 2018-10-05 NOTE — ED NOTES
APPEARANCE: Alert, oriented and in no acute distress.  CARDIAC: Normal rate and rhythm, no murmur heard.   PERIPHERAL VASCULAR: peripheral pulses present. Normal cap refill. No edema. Warm to touch.    RESPIRATORY:Normal rate and effort, breath sounds clear bilaterally throughout chest. Respirations are equal and unlabored no obvious signs of distress.  GASTRO: soft, bowel sounds normal, no tenderness, no abdominal distention.  MUSC: Full ROM. No obvious deformity. R knee swelling and tenderness  SKIN: Skin is warm and dry, normal skin turgor, mucous membranes moist.  NEURO: 5/5 strength major flexors/extensors bilaterally. Sensory intact to light touch bilaterally. [place] coma scale: eyes open spontaneously-4, oriented & converses-5, obeys commands-6. No neurological abnormalities.   MENTAL STATUS: awake, alert and aware of environment.  EYE: PERRL, both eyes: pupils brisk and reactive to light. Normal size.  ENT: EARS: no obvious drainage. NOSE: no active bleeding.   .

## 2018-10-05 NOTE — ED PROVIDER NOTES
NAME:  Andrew Velazquez  CSN:     [id number]  MRN:    [id number]  ADMIT DATE: 10/5/2018        eMERGENCY dEPARTMENT eNCOUnter    CHIEF COMPLAINT    Chief Complaint   Patient presents with    Knee Pain     Patient presents to the ED with reports of having right knee pain. States having hx of chronic knee pain but states pain is worse today.     Hypertension     Patient noted to be hypertensive at triage. States having a hx of HTN but has not taken his medications today.        HPI      Andrew Velazquez is a 58 y.o. male who presents to the ED for evaluation of chronic knee pain. Patient states he has arthritis in the knee but today the pain is worsened is experiencing some swelling.  He is able to ambulate on it but states he has difficulty with flexing the knee.  He denies any fevers or recent trauma.  The patient is only taking hydrocodone for the pain at home.  He has hypertension and did not take his medications today because he was driving and states that make him sleepy.          ALLERGIES    Review of patient's allergies indicates:   Allergen Reactions    Penicillins Hives    Sustiva [efavirenz] Other (See Comments)     insomnia       PAST MEDICAL HISTORY  Past Medical History:   Diagnosis Date    Arthritis     Back pain     Chronic kidney disease     stage 3    COPD (chronic obstructive pulmonary disease)     Esophageal reflux     HIV (human immunodeficiency virus infection)     2006    HIV infection     Hypertension     Lumbago     Psoriasis     Skin disorder     Tobacco use        SURGICAL HISTORY    Past Surgical History:   Procedure Laterality Date    COLONOSCOPY N/A 4/20/2015    Performed by Kelly Villalobos MD at [facility]    FINGER SURGERY      TONSILLECTOMY         SOCIAL HISTORY    Social History     Socioeconomic History    Marital status:      Spouse name: Not on file    Number of children: Not on file    Years of education: 14    Highest education level: Not on file  "  Social Needs    Financial resource strain: Not on file    Food insecurity - worry: Not on file    Food insecurity - inability: Not on file    Transportation needs - medical: Not on file    Transportation needs - non-medical: Not on file   Occupational History    Not on file   Tobacco Use    Smoking status: Current Every Day Smoker     Packs/day: 0.25     Years: 40.00     Pack years: 10.00     Types: Cigarettes    Smokeless tobacco: Never Used   Substance and Sexual Activity    Alcohol use: Yes     Alcohol/week: 0.0 oz     Comment: occassional beer; pt drank beer this am    Drug use: No    Sexual activity: No     Partners: Male     Birth control/protection: Condom   Other Topics Concern    Not on file   Social History Narrative    Not on file       FAMILY HISTORY    Family History   Problem Relation Age of Onset    Arthritis Mother     Hypertension Mother     Hypertension Sister     Arthritis Sister     No Known Problems Father        REVIEW OF SYSTEMS   ROS  All Systems otherwise negative except as noted in the History of Present Illness.        PHYSICAL EXAM    Reviewed Triage Note  VITAL SIGNS:   ED Triage Vitals [10/05/18 0132]   Enc Vitals Group      BP (!) 198/109      Pulse 80      Resp 17      Temp 98.5 °F (36.9 °C)      Temp src Oral      SpO2 98 %      Weight 180 lb      Height 5' 10"      Head Circumference       Peak Flow       Pain Score       Pain Loc       Pain Edu?       Excl. in GC?        Patient Vitals for the past 24 hrs:   BP Temp Temp src Pulse Resp SpO2 Height Weight   10/05/18 0145 (!) 185/101 -- -- 75 -- 99 % -- --   10/05/18 0132 (!) 198/109 98.5 °F (36.9 °C) Oral 80 17 98 % 5' 10" (1.778 m) 81.6 kg (180 lb)           Physical Exam    Constitutional:  Well-developed, well-nourished. No acute distress  HENT:  Normocephalic, atraumatic.  Eyes:  EOMI. Conjunctiva normal without discharge.   Neck: Normal range of motion.No stridor. No meningismus.   Respiratory:  No " respiratory distress, retractions, or conversational dyspnea.   Cardiovascular:  Normal heart rate. No pitting lower extremity edema.   Musculoskeletal:  Moderate swelling to the right knee, range of motion is intact, no erythema, no knee instability or deformity   Integument:  Warm and dry. No rash.  Neurologic:  Normal motor function. No focal deficits noted. Alert and Interactive.  Psychiatric:  Affect normal. Mood normal.         LABS  Pertinent labs reviewed. (See chart for details)   Labs Reviewed - No data to display      RADIOLOGY    Imaging Results    None         PROCEDURES    Procedures      EKG     Interpreted by ERP:         ED COURSE & MEDICAL DECISION MAKING    Pertinent & Imaging studies reviewed. (See chart for details and specific orders.)        Medications   dexamethasone injection 8 mg (not administered)   ketorolac injection 30 mg (not administered)          Patient is suffering from chronic osteoarthritis.  I will treat him with steroids and Toradol.  And discharged home with naproxen.  Patient instructed to follow up with his primary care provider       DISPOSITION  Patient discharged in stable condition at No discharge date for patient encounter.      DISCHARGE INSTRUCTIONS & Andrew Finnegan   Home Medication Instructions HORACE:[id number]    Printed on:10/05/18 7430   Medication Information                      abacavir-dolutegravir-lamivud (TRIUMEQ) 600- mg Tab  Take 1 tablet by mouth once daily.             albuterol 90 mcg/actuation inhaler  Inhale 1-2 puffs into the lungs every 6 (six) hours as needed for Wheezing (sob).             aspirin (ECOTRIN) 81 MG EC tablet  Take 81 mg by mouth once daily.             b complex vitamins tablet  Take 1 tablet by mouth once daily.             ferrous sulfate 325 mg (65 mg iron) Tab tablet  Take 1 tablet (325 mg total) by mouth once daily.             fluocinonide 0.05% (LIDEX) 0.05 % cream  Apply 1 application topically 2 (two)  times daily.             gabapentin (NEURONTIN) 300 MG capsule  Take 1 capsule (300 mg total) by mouth 3 (three) times daily.             HYDROcodone-acetaminophen (NORCO) 7.5-325 mg per tablet  Take 1 tablet by mouth every 6 (six) hours as needed for Pain. Do not fill until 10/4/18             meloxicam (MOBIC) 7.5 MG tablet  Take 1 tablet (7.5 mg total) by mouth once daily.             methylPREDNISolone (MEDROL DOSEPACK) 4 mg tablet  Pack as directed             metoprolol succinate (TOPROL-XL) 50 MG 24 hr tablet  Take 1 tablet (50 mg total) by mouth once daily.             NIFEdipine (PROCARDIA-XL) 90 MG (OSM) 24 hr tablet  Take 90 mg by mouth once daily.             pravastatin (PRAVACHOL) 40 MG tablet  Take 1 tablet (40 mg total) by mouth every evening.             sulfamethoxazole-trimethoprim 800-160mg (BACTRIM DS) 800-160 mg Tab  Take 1 tablet by mouth once daily.             sulfamethoxazole-trimethoprim 800-160mg (BACTRIM DS) 800-160 mg Tab  Take 1 tablet by mouth once daily.             triamcinolone acetonide 0.1% (KENALOG) 0.1 % cream  Apply topically 2 (two) times daily.             TRIUMEQ 600- mg Tab  TAKE 1 TABLET BY MOUTH EVERY DAY             zolpidem (AMBIEN) 10 mg Tab  Take 1 tablet (10 mg total) by mouth nightly.                   This SmartLink is deprecated. Use AVSMEDLIST instead to display the medication list for a patient.        FINAL IMPRESSION    1. Osteoarthritis of right knee, unspecified osteoarthritis type              Blood Pressure Follow-Up Advised  Patient advised to follow up with PCP within 3-5 days for blood pressure re-check if blood pressure is equal to or greater than 120/80.         Critical care time spent with this patient (not including separately billable items) was  0 minutes.     DISCLAIMER: This note was prepared with Dragon NaturallySpeaking voice recognition transcription software. Garbled syntax, mangled pronouns, and other bizarre constructions may be  attributed to that software system.      Luis Antonio Coe MD  10/05/2018  1:55 AM          Luis Antonio Coe MD  10/05/18 0157

## 2018-10-18 PROBLEM — Z87.39 HISTORY OF TRIGGER FINGER: Status: ACTIVE | Noted: 2018-10-18

## 2018-11-22 ENCOUNTER — HOSPITAL ENCOUNTER (OUTPATIENT)
Facility: HOSPITAL | Age: 58
Discharge: HOME | End: 2018-11-23
Attending: EMERGENCY MEDICINE | Admitting: HOSPITALIST
Payer: MEDICAID

## 2018-11-22 DIAGNOSIS — G45.9 TIA (TRANSIENT ISCHEMIC ATTACK): ICD-10-CM

## 2018-11-22 DIAGNOSIS — R42 DIZZINESS: ICD-10-CM

## 2018-11-22 DIAGNOSIS — I10 ESSENTIAL HYPERTENSION: Chronic | ICD-10-CM

## 2018-11-22 DIAGNOSIS — I16.0 HYPERTENSIVE URGENCY: Primary | ICD-10-CM

## 2018-11-22 DIAGNOSIS — R51.9 HEADACHE: ICD-10-CM

## 2018-11-22 PROBLEM — R11.2 NAUSEA AND VOMITING: Status: ACTIVE | Noted: 2018-11-22

## 2018-11-22 LAB
ALBUMIN SERPL BCP-MCNC: 4 G/DL
ALP SERPL-CCNC: 126 U/L
ALT SERPL W/O P-5'-P-CCNC: 22 U/L
AMPHET+METHAMPHET UR QL: NEGATIVE
ANION GAP SERPL CALC-SCNC: 15 MMOL/L
APTT BLDCRRT: 30 SEC
AST SERPL-CCNC: 20 U/L
BACTERIA #/AREA URNS HPF: ABNORMAL /HPF
BARBITURATES UR QL SCN>200 NG/ML: NEGATIVE
BASOPHILS # BLD AUTO: 0.01 K/UL
BASOPHILS NFR BLD: 0.1 %
BENZODIAZ UR QL SCN>200 NG/ML: NEGATIVE
BILIRUB SERPL-MCNC: 0.5 MG/DL
BILIRUB UR QL STRIP: NEGATIVE
BUN SERPL-MCNC: 34 MG/DL
BZE UR QL SCN: NEGATIVE
CALCIUM SERPL-MCNC: 9.6 MG/DL
CANNABINOIDS UR QL SCN: NEGATIVE
CHLORIDE SERPL-SCNC: 101 MMOL/L
CLARITY UR: CLEAR
CO2 SERPL-SCNC: 21 MMOL/L
COLOR UR: YELLOW
CREAT SERPL-MCNC: 1.3 MG/DL
CREAT SERPL-MCNC: 1.3 MG/DL (ref 0.5–1.4)
CREAT UR-MCNC: 170.9 MG/DL
DIFFERENTIAL METHOD: NORMAL
EOSINOPHIL # BLD AUTO: 0 K/UL
EOSINOPHIL NFR BLD: 0.1 %
ERYTHROCYTE [DISTWIDTH] IN BLOOD BY AUTOMATED COUNT: 14.4 %
EST. GFR  (AFRICAN AMERICAN): >60 ML/MIN/1.73 M^2
EST. GFR  (NON AFRICAN AMERICAN): >60 ML/MIN/1.73 M^2
ETHANOL SERPL-MCNC: <10 MG/DL
GLUCOSE SERPL-MCNC: 145 MG/DL
GLUCOSE UR QL STRIP: NEGATIVE
HCT VFR BLD AUTO: 46.5 %
HGB BLD-MCNC: 15.4 G/DL
HGB UR QL STRIP: ABNORMAL
HYALINE CASTS #/AREA URNS LPF: 2 /LPF
INR PPP: 1
KETONES UR QL STRIP: NEGATIVE
LEUKOCYTE ESTERASE UR QL STRIP: NEGATIVE
LYMPHOCYTES # BLD AUTO: 3.3 K/UL
LYMPHOCYTES NFR BLD: 41.6 %
MCH RBC QN AUTO: 30 PG
MCHC RBC AUTO-ENTMCNC: 33.1 G/DL
MCV RBC AUTO: 91 FL
METHADONE UR QL SCN>300 NG/ML: NEGATIVE
MICROSCOPIC COMMENT: ABNORMAL
MONOCYTES # BLD AUTO: 0.6 K/UL
MONOCYTES NFR BLD: 7.4 %
NEUTROPHILS # BLD AUTO: 4 K/UL
NEUTROPHILS NFR BLD: 50.4 %
NITRITE UR QL STRIP: NEGATIVE
OPIATES UR QL SCN: NEGATIVE
PCP UR QL SCN>25 NG/ML: NEGATIVE
PH UR STRIP: 6 [PH] (ref 5–8)
PLATELET # BLD AUTO: 233 K/UL
PMV BLD AUTO: 10.2 FL
POC PTINR: 1.1 (ref 0.9–1.2)
POC PTWBT: 13.3 SEC (ref 9.7–14.3)
POCT GLUCOSE: 160 MG/DL (ref 70–110)
POTASSIUM SERPL-SCNC: 4 MMOL/L
PROT SERPL-MCNC: 8.3 G/DL
PROT UR QL STRIP: ABNORMAL
PROTHROMBIN TIME: 10.7 SEC
RBC # BLD AUTO: 5.13 M/UL
RBC #/AREA URNS HPF: 2 /HPF (ref 0–4)
SAMPLE: NORMAL
SAMPLE: NORMAL
SODIUM SERPL-SCNC: 137 MMOL/L
SP GR UR STRIP: >=1.03 (ref 1–1.03)
TOXICOLOGY INFORMATION: NORMAL
TROPONIN I SERPL DL<=0.01 NG/ML-MCNC: 0.02 NG/ML
URN SPEC COLLECT METH UR: ABNORMAL
UROBILINOGEN UR STRIP-ACNC: NEGATIVE EU/DL
WBC # BLD AUTO: 8.01 K/UL
WBC #/AREA URNS HPF: 7 /HPF (ref 0–5)

## 2018-11-22 PROCEDURE — 25000003 PHARM REV CODE 250: Performed by: HOSPITALIST

## 2018-11-22 PROCEDURE — 96375 TX/PRO/DX INJ NEW DRUG ADDON: CPT

## 2018-11-22 PROCEDURE — G0378 HOSPITAL OBSERVATION PER HR: HCPCS

## 2018-11-22 PROCEDURE — 93005 ELECTROCARDIOGRAM TRACING: CPT

## 2018-11-22 PROCEDURE — 25000003 PHARM REV CODE 250: Performed by: EMERGENCY MEDICINE

## 2018-11-22 PROCEDURE — 85730 THROMBOPLASTIN TIME PARTIAL: CPT

## 2018-11-22 PROCEDURE — 99203 OFFICE O/P NEW LOW 30 MIN: CPT | Mod: ,,, | Performed by: PSYCHIATRY & NEUROLOGY

## 2018-11-22 PROCEDURE — 80307 DRUG TEST PRSMV CHEM ANLYZR: CPT

## 2018-11-22 PROCEDURE — 93010 ELECTROCARDIOGRAM REPORT: CPT | Mod: ,,, | Performed by: INTERNAL MEDICINE

## 2018-11-22 PROCEDURE — A4216 STERILE WATER/SALINE, 10 ML: HCPCS | Performed by: HOSPITALIST

## 2018-11-22 PROCEDURE — 85610 PROTHROMBIN TIME: CPT

## 2018-11-22 PROCEDURE — 81000 URINALYSIS NONAUTO W/SCOPE: CPT | Mod: 59

## 2018-11-22 PROCEDURE — 84484 ASSAY OF TROPONIN QUANT: CPT

## 2018-11-22 PROCEDURE — 96374 THER/PROPH/DIAG INJ IV PUSH: CPT

## 2018-11-22 PROCEDURE — 80053 COMPREHEN METABOLIC PANEL: CPT

## 2018-11-22 PROCEDURE — 96361 HYDRATE IV INFUSION ADD-ON: CPT

## 2018-11-22 PROCEDURE — 63600175 PHARM REV CODE 636 W HCPCS: Performed by: EMERGENCY MEDICINE

## 2018-11-22 PROCEDURE — 80320 DRUG SCREEN QUANTALCOHOLS: CPT

## 2018-11-22 PROCEDURE — 96376 TX/PRO/DX INJ SAME DRUG ADON: CPT | Mod: 59

## 2018-11-22 PROCEDURE — 85025 COMPLETE CBC W/AUTO DIFF WBC: CPT

## 2018-11-22 PROCEDURE — 99285 EMERGENCY DEPT VISIT HI MDM: CPT | Mod: 25

## 2018-11-22 RX ORDER — HYDRALAZINE HYDROCHLORIDE 20 MG/ML
10 INJECTION INTRAMUSCULAR; INTRAVENOUS
Status: COMPLETED | OUTPATIENT
Start: 2018-11-22 | End: 2018-11-22

## 2018-11-22 RX ORDER — NIFEDIPINE 30 MG/1
90 TABLET, EXTENDED RELEASE ORAL DAILY
Status: DISCONTINUED | OUTPATIENT
Start: 2018-11-23 | End: 2018-11-23 | Stop reason: HOSPADM

## 2018-11-22 RX ORDER — ONDANSETRON 2 MG/ML
4 INJECTION INTRAMUSCULAR; INTRAVENOUS EVERY 8 HOURS PRN
Status: DISCONTINUED | OUTPATIENT
Start: 2018-11-22 | End: 2018-11-23 | Stop reason: HOSPADM

## 2018-11-22 RX ORDER — HYDROCHLOROTHIAZIDE 25 MG/1
25 TABLET ORAL DAILY
Status: DISCONTINUED | OUTPATIENT
Start: 2018-11-23 | End: 2018-11-23 | Stop reason: HOSPADM

## 2018-11-22 RX ORDER — ASPIRIN 81 MG/1
81 TABLET ORAL DAILY
Status: DISCONTINUED | OUTPATIENT
Start: 2018-11-23 | End: 2018-11-23 | Stop reason: HOSPADM

## 2018-11-22 RX ORDER — LABETALOL HCL 20 MG/4 ML
10 SYRINGE (ML) INTRAVENOUS
Status: DISCONTINUED | OUTPATIENT
Start: 2018-11-22 | End: 2018-11-23

## 2018-11-22 RX ORDER — HYDROCODONE BITARTRATE AND ACETAMINOPHEN 7.5; 325 MG/1; MG/1
1 TABLET ORAL EVERY 4 HOURS PRN
Status: DISCONTINUED | OUTPATIENT
Start: 2018-11-22 | End: 2018-11-23 | Stop reason: HOSPADM

## 2018-11-22 RX ORDER — SULFAMETHOXAZOLE AND TRIMETHOPRIM 800; 160 MG/1; MG/1
1 TABLET ORAL DAILY
Status: DISCONTINUED | OUTPATIENT
Start: 2018-11-23 | End: 2018-11-23 | Stop reason: HOSPADM

## 2018-11-22 RX ORDER — GABAPENTIN 300 MG/1
300 CAPSULE ORAL 3 TIMES DAILY
Status: DISCONTINUED | OUTPATIENT
Start: 2018-11-22 | End: 2018-11-23 | Stop reason: HOSPADM

## 2018-11-22 RX ORDER — ATORVASTATIN CALCIUM 20 MG/1
40 TABLET, FILM COATED ORAL DAILY
Status: DISCONTINUED | OUTPATIENT
Start: 2018-11-23 | End: 2018-11-23 | Stop reason: HOSPADM

## 2018-11-22 RX ORDER — METOPROLOL SUCCINATE 50 MG/1
50 TABLET, EXTENDED RELEASE ORAL DAILY
Status: DISCONTINUED | OUTPATIENT
Start: 2018-11-23 | End: 2018-11-23 | Stop reason: HOSPADM

## 2018-11-22 RX ORDER — PROCHLORPERAZINE EDISYLATE 5 MG/ML
10 INJECTION INTRAMUSCULAR; INTRAVENOUS
Status: COMPLETED | OUTPATIENT
Start: 2018-11-22 | End: 2018-11-22

## 2018-11-22 RX ORDER — HYDRALAZINE HYDROCHLORIDE 20 MG/ML
10 INJECTION INTRAMUSCULAR; INTRAVENOUS
Status: DISCONTINUED | OUTPATIENT
Start: 2018-11-22 | End: 2018-11-22

## 2018-11-22 RX ORDER — IRBESARTAN 300 MG/1
300 TABLET ORAL DAILY
Status: DISCONTINUED | OUTPATIENT
Start: 2018-11-23 | End: 2018-11-23 | Stop reason: HOSPADM

## 2018-11-22 RX ORDER — SODIUM CHLORIDE 0.9 % (FLUSH) 0.9 %
3 SYRINGE (ML) INJECTION EVERY 8 HOURS
Status: DISCONTINUED | OUTPATIENT
Start: 2018-11-22 | End: 2018-11-23 | Stop reason: HOSPADM

## 2018-11-22 RX ADMIN — HYDRALAZINE HYDROCHLORIDE 10 MG: 20 INJECTION INTRAMUSCULAR; INTRAVENOUS at 03:11

## 2018-11-22 RX ADMIN — HYDRALAZINE HYDROCHLORIDE 10 MG: 20 INJECTION INTRAMUSCULAR; INTRAVENOUS at 04:11

## 2018-11-22 RX ADMIN — HYDRALAZINE HYDROCHLORIDE 10 MG: 20 INJECTION INTRAMUSCULAR; INTRAVENOUS at 05:11

## 2018-11-22 RX ADMIN — SODIUM CHLORIDE 500 ML: 0.9 INJECTION, SOLUTION INTRAVENOUS at 03:11

## 2018-11-22 RX ADMIN — GABAPENTIN 300 MG: 300 CAPSULE ORAL at 08:11

## 2018-11-22 RX ADMIN — PROCHLORPERAZINE EDISYLATE 10 MG: 5 INJECTION INTRAMUSCULAR; INTRAVENOUS at 03:11

## 2018-11-22 RX ADMIN — Medication 3 ML: at 10:11

## 2018-11-22 NOTE — ASSESSMENT & PLAN NOTE
Can not completely exclude posterior circulation event, however findings on NIHSS do not indicate clear focal abnormality.  B/L upper quadrant visual sx could indicate b/l inferior occipital involvement; crossing sensory sx are nonspecific.  In setting of HTN consider PRES, hypertensive encephalopathy, peripheral vestibular ds, viral or other infections (hx of contact, HIV+ at risk for opportunistic infection) etiology. Recommend MRI brain, gradual reduction of BP (target 25% lowering in first 24 hrs), continue w/u for alternate etiology of sx and long term stroke risk factor modification

## 2018-11-22 NOTE — HPI
Mr. Andrew Velazquez is a 59 yo black male with HTN, HLD, AIDS with last CD4 149 on bactrim prophylaxis, GERD, JANA, and COPD who presents today with dizziness for stroke rule out. He reports that he has experienced progressively persistent imbalance and ataxia accompanied by generalized weakness, fatigue, and several episodes of nausea and vomiting that began last night around 2am. He denies fever, chills, headache, chest pain, shortness of breath, abdominal pain, diarrhea, changes in speech, changes in vision, numbness or tingling, or any other acute symptoms. He denies any recent trauma. Patient does report that he has been taking care of a friend this week who has had nausea, vomiting and diarrhea. He does not take his medications regularly. On presentation to the ED his BP was 222/103 and remained elevated despite two doses of hydralazine. Code stroke called d/t pt's imbalance. Vascular neurologist Dr. Byers felt sx were not focal and could represent posterior circulation event vs hypertensive encephalopathy, PRES, or opportunistic infection. Labs were unremarkable. CT head and CXR showed nothing acute. He was given prochlorperazine and a 500 mL IV fluid bolus with improvement in nausea and vomiting. He was admitted to [facility] for further evaluation in the CDU.

## 2018-11-22 NOTE — SUBJECTIVE & OBJECTIVE
"  Woke up with symptoms?: no  Last known normal: Last Known Normal Date: 11/22/18      Recent bleeding noted: no  Does the patient take any Blood Thinners? no  Medications: No relevant medications      Past Medical History: hypertension, kidney problems/dialysis and HIV    Past Surgical History: no major surgeries within the last 2 weeks    Family History: hypertension    Social History: drinking and smoker (active)    Allergies: Penicillins  Sustiva [Efavirenz]      Review of Systems   Constitutional: Positive for fatigue.   Respiratory: Negative for shortness of breath.    Cardiovascular: Negative for chest pain.   Gastrointestinal: Positive for nausea and vomiting.   Neurological: Positive for dizziness, weakness and light-headedness.     Objective:   Vitals: Blood pressure (!) 222/103, pulse 62, resp. rate 19, height 5' 10" (1.778 m), weight 81.6 kg (180 lb), SpO2 100 %.     CT READ: Yes  No hemmorhage. No mass effect. No early infarct signs.     Physical Exam   Constitutional: He appears well-developed and well-nourished.   HENT:   Head: Normocephalic and atraumatic.   Cardiovascular: Normal rate and regular rhythm.   Pulmonary/Chest: Effort normal.   Psychiatric: He has a normal mood and affect. His behavior is normal. Judgment and thought content normal.   Vitals reviewed.        "

## 2018-11-22 NOTE — ED NOTES
Patient states he didn't really sleep last night; hasnt been his normal self since 2 am LKW. Patient states he has been caring for someone who is sick and having the same symptoms. Patient complains of n/v , dizziness, and weakness; difficulty with ambulation and when sitting up sways side to side. Patient also states he did not take his blood pressure medication today .

## 2018-11-22 NOTE — CONSULTS
[facility]  Vascular Neurology  Comprehensive Stroke Center  Tele-Consultation Note      Consults    Consulting Provider: Spoke Physician:: Naman Guadalupe  Current Providers  No providers found    Patient Location: [facility]  Spoke hospital nurse at bedside with patient assisting consultant.     Patient information was obtained from patient.       Assessment/Plan:     STROKE DOCUMENTATION     Acute Stroke Times:   Acute Stroke Times   Last Known Normal Date: 11/22/18  Symptom Onset Time: 0200  Stroke Team Arrival Date: 11/22/18  Stroke Team Arrival Time: 1433  CT Interpretation Time: 1433    NIH Scale:  Interval: baseline (upon arrival/admit)  1a. Level Of Consciousness: 0-->Alert: keenly responsive  1b. LOC Questions: 0-->Answers both questions correctly  1c. LOC Commands: 0-->Performs both tasks correctly  2. Best Gaze: 0-->Normal  3. Visual: 1-->Partial hemianopia(bilateral superior quadrantanopsia; can see some movement but not count fingers)  4. Facial Palsy: 0-->Normal symmetrical movements  5a. Motor Arm, Left: 0-->No drift: limb holds 90 (or 45) degrees for full 10 secs  5b. Motor Arm, Right: 0-->No drift: limb holds 90 (or 45) degrees for full 10 secs  6a. Motor Leg, Left: 0-->No drift: leg holds 30 degree position for full 5 secs  6b. Motor Leg, Right: 0-->No drift: leg holds 30 degree position for full 5 secs  7. Limb Ataxia: 0-->Absent  8. Sensory: 1-->Mild-to-moderate sensory loss: patient feels pinprick is less sharp or is dull on the affected side: or there is a loss of superficial pain with pinprick, but patient is aware of being touched(decreased sensation R face and wrist, alternates in the lower extremities)  9. Best Language: 0-->No aphasia: normal  10. Dysarthria: 0-->Normal  11. Extinction and Inattention (formerly Neglect): 0-->No abnormality  Total (NIH Stroke Scale): 2     Modified [place]    [place] Coma Scale:    ABCD2  "Score:    [id number] Score:   HAS -BLED Score:   ICH Score:   Hunt & Aguilera Classification:       Diagnoses:   Dizziness    Can not completely exclude posterior circulation event, however findings on NIHSS do not indicate clear focal abnormality.  B/L upper quadrant visual sx could indicate b/l inferior occipital involvement; crossing sensory sx are nonspecific.  In setting of HTN consider PRES, hypertensive encephalopathy, peripheral vestibular ds, viral or other infections (hx of contact, HIV+ at risk for opportunistic infection) etiology. Recommend MRI brain, gradual reduction of BP (target 25% lowering in first 24 hrs), continue w/u for alternate etiology of sx and long term stroke risk factor modification          Blood pressure (!) 222/103, pulse 62, resp. rate 19, height 5' 10" (1.778 m), weight 81.6 kg (180 lb), SpO2 100 %.  Alteplase Eligible?: No  Alteplase Recommendation: Alteplase not recommended due to Outside of treatment window , Suspected stroke mimic , minimal deficit  and Elevated BP   Possible Interventional Revascularization Candidate? No; No large vessel occlusion    Disposition Recommendation: admit to inpatient  do not transfer      Subjective:     History of Present Illness:  Pt reports onset last night around 2 AM, felt diffusely weak, diaphoretic, dizzy and unsteady.  Had been awake at onset, hard for him to tell me if sudden or gradual onset.  + N/V multiple times overnight.  Gait unsteady.  Has not been taking his HTN medications recently.  States he has been sitting with a friend who had the same type of symptoms a couple days ago      Woke up with symptoms?: no  Last known normal: Last Known Normal Date: 11/22/18      Recent bleeding noted: no  Does the patient take any Blood Thinners? no  Medications: No relevant medications      Past Medical History: hypertension, kidney problems/dialysis and HIV    Past Surgical History: no major surgeries within the last 2 weeks    Family History: " "hypertension    Social History: drinking and smoker (active)    Allergies: Penicillins  Sustiva [Efavirenz]      Review of Systems   Constitutional: Positive for fatigue.   Respiratory: Negative for shortness of breath.    Cardiovascular: Negative for chest pain.   Gastrointestinal: Positive for nausea and vomiting.   Neurological: Positive for dizziness, weakness and light-headedness.     Objective:   Vitals: Blood pressure (!) 222/103, pulse 62, resp. rate 19, height 5' 10" (1.778 m), weight 81.6 kg (180 lb), SpO2 100 %.     CT READ: Yes  No hemmorhage. No mass effect. No early infarct signs.     Physical Exam   Constitutional: He appears well-developed and well-nourished.   HENT:   Head: Normocephalic and atraumatic.   Cardiovascular: Normal rate and regular rhythm.   Pulmonary/Chest: Effort normal.   Psychiatric: He has a normal mood and affect. His behavior is normal. Judgment and thought content normal.   Vitals reviewed.            Recommended the emergency room physician to have a brief discussion with the patient and/or family if available regarding the risks and benefits of treatment, and to briefly document the occurrence of that discussion in his clinical encounter note.     The attending portion of this evaluation, treatment, and documentation was performed per Tyrese Byers DO via audiovisual.    Billing code:  (non-intervention mild to moderate stroke, TIA, some mimics)    · This patient has a critical neurological condition/illness, with some potential for high morbidity and mortality.  · There is a moderate probability for acute neurological change leading to clinical and possibly life-threatening deterioration requiring highest level of physician preparedness for urgent intervention.  · Care was coordinated with other physicians involved in the patient's care.  · Radiologic studies and laboratory data were reviewed and interpreted, and plan of care was re-assessed based on the " results.  · Diagnosis, treatment options and prognosis may have been discussed with the patient and/or family members or caregiver.      In your opinion, this was a: Tier 2    Consult End Time: 3:02 PM     Tyrese Byers [facility] Stroke Center  Vascular Neurology   [facility]

## 2018-11-22 NOTE — ED PROVIDER NOTES
"Encounter Date: 11/22/2018    SCRIBE #1 NOTE: I, Rayray Rojas, am scribing for, and in the presence of,  . I have scribed the entire note.       History     Chief Complaint   Patient presents with    Vomiting     Onset 0200 last night with vomiting, difficulty walking, frontal headache.      59 y/o M presents to the ED due to emesis and dizziness. Patient reports around 0200 this morning he had a "weird feeling" in his head. He felt somewhat sweaty, and then began to feel dizzy. Reports difficulty with his equilibrium. He noticed it would get better when he would lie down and stay still, worse when walking or with movement of his head. When the dizziness became more intense, he would feel more nauseated, and has had several episodes of nonbloody, nonbilious emesis. He reports the symptoms have been essentially constant, with persistent dizziness that gets worse with movement, but does not entirely resolve with staying still. Also reports some "blurry vision" he has difficulty characterizing. Denies any CP, lightheadedness.       The history is provided by the patient.     Review of patient's allergies indicates:   Allergen Reactions    Penicillins Hives    Sustiva [efavirenz] Other (See Comments)     insomnia     Past Medical History:   Diagnosis Date    Arthritis     Back pain     Chronic kidney disease     stage 3    COPD (chronic obstructive pulmonary disease)     Esophageal reflux     HIV (human immunodeficiency virus infection)     2006    HIV infection     Hypertension     Lumbago     Psoriasis     Skin disorder     Tobacco use      Past Surgical History:   Procedure Laterality Date    COLONOSCOPY N/A 4/20/2015    Performed by Kelly Villalobos MD at [facility] ENDO    FINGER SURGERY      TONSILLECTOMY       Family History   Problem Relation Age of Onset    Arthritis Mother     Hypertension Mother     Hypertension Sister     Arthritis Sister     No Known Problems Father  "     Social History     Tobacco Use    Smoking status: Current Every Day Smoker     Packs/day: 0.25     Years: 40.00     Pack years: 10.00     Types: Cigarettes    Smokeless tobacco: Never Used   Substance Use Topics    Alcohol use: Yes     Alcohol/week: 0.0 oz     Comment: occassional beer; pt drank beer this am    Drug use: No     Review of Systems   Constitutional: Positive for fatigue. Negative for chills and fever.   HENT: Negative for congestion, facial swelling and trouble swallowing.    Eyes: Positive for visual disturbance. Negative for photophobia, pain and redness.   Respiratory: Negative for cough, choking and shortness of breath.    Cardiovascular: Negative for chest pain, palpitations and leg swelling.   Gastrointestinal: Positive for nausea and vomiting. Negative for abdominal pain and diarrhea.   Genitourinary: Negative for dysuria and hematuria.   Musculoskeletal: Negative for gait problem and neck pain.   Skin: Negative for rash.   Neurological: Positive for dizziness. Negative for seizures, facial asymmetry, speech difficulty, light-headedness, numbness and headaches.   All other systems reviewed and are negative.      Physical Exam     Initial Vitals [11/22/18 1408]   BP Pulse Resp Temp SpO2   (!) 222/103 62 19 -- 100 %      MAP       --         Physical Exam    Nursing note and vitals reviewed.  Constitutional: He appears well-developed and well-nourished. He appears distressed (fMild discomfort).   HENT:   Head: Normocephalic and atraumatic.   Oropharynx clear; Dry MM   Eyes: Conjunctivae and EOM are normal. Pupils are equal, round, and reactive to light.   Neck: Normal range of motion. Neck supple. No tracheal deviation present.   Cardiovascular: Normal rate, regular rhythm, normal heart sounds and intact distal pulses.   Pulmonary/Chest: Breath sounds normal. No respiratory distress. He has no wheezes. He has no rhonchi. He has no rales.   Abdominal: Soft. Bowel sounds are normal. He  exhibits no distension. There is no tenderness.   Musculoskeletal: Normal range of motion. He exhibits no edema or tenderness.   Neurological: He is alert and oriented to person, place, and time. He has normal strength. No sensory deficit. GCS score is 15. GCS eye subscore is 4. GCS verbal subscore is 5. GCS motor subscore is 6.   Unsteady gait, +rhomberg   Skin: Skin is warm and dry. Capillary refill takes less than 2 seconds.         ED Course   Critical Care  Date/Time: 11/22/2018 4:31 PM  Performed by: Naman Guadalupe MD  Authorized by: Naman Guadalupe MD   Direct patient critical care time: 15 minutes  Additional history critical care time: 15 minutes  Ordering / reviewing critical care time: 15 minutes  Documentation critical care time: 15 minutes  Consulting other physicians critical care time: 15 minutes  Consult with family critical care time: 10 minutes  Total critical care time (exclusive of procedural time) : 85 minutes  Critical care time was exclusive of separately billable procedures and treating other patients.  Critical care was necessary to treat or prevent imminent or life-threatening deterioration of the following conditions: CNS failure or compromise.  Critical care was time spent personally by me on the following activities: discussions with consultants, evaluation of patient's response to treatment, ordering and review of laboratory studies, pulse oximetry, review of old charts, obtaining history from patient or surrogate, discussions with primary provider, interpretation of cardiac output measurements, examination of patient, ordering and performing treatments and interventions, ordering and review of radiographic studies and re-evaluation of patient's condition.        Labs Reviewed   CBC W/ AUTO DIFFERENTIAL   COMPREHENSIVE METABOLIC PANEL   URINALYSIS   DRUG SCREEN PANEL, URINE EMERGENCY   ALCOHOL,MEDICAL (ETHANOL)   PROTIME-INR   APTT   TROPONIN I     EKG Readings: (Independently  Interpreted)   Initial Reading: No STEMI. Previous EKG: Compared with most recent EKG Previous EKG Date: 8/4/17 (Nonspecific changes). Rhythm: Normal Sinus Rhythm. Heart Rate: 64. Ectopy: No Ectopy. Conduction: LVH. ST Segments: Normal ST Segments. T Waves Flipped: III. Axis: Normal.         X-Rays:   Independently Interpreted Readings:   Other Readings:  Imaging interpreted by radiologist and visualized by me:    Imaging Results          X-Ray Chest AP Portable (Final result)  Result time 11/22/18 16:08:13    Final result by Samantha Vo MD (11/22/18 16:08:13)                 Impression:      As above.      Electronically signed by: Samantha Vo MD  Date:    11/22/2018  Time:    16:08             Narrative:    EXAMINATION:  XR CHEST AP PORTABLE    CLINICAL HISTORY:  Stroke;    TECHNIQUE:  Single frontal view of the chest was performed.    COMPARISON:  09/02/2018    FINDINGS:  chronic lung changes are seen bilaterally.  No consolidation or pleural effusions.  The heart is normal in size.  The skeletal structures are intact.                               CT Head Without Contrast (Final result)  Result time 11/22/18 14:31:47    Final result by Samantha Vo MD (11/22/18 14:31:47)                 Impression:      Unremarkable noncontrast CT head specifically without evidence for acute intracranial hemorrhage.  Clinical correlation and further evaluation as warranted.      Electronically signed by: Samantha Vo MD  Date:    11/22/2018  Time:    14:31             Narrative:    EXAMINATION:  CT HEAD WITHOUT CONTRAST    CLINICAL HISTORY:  Headache, acute, norm neuro exam;    TECHNIQUE:  Multiple sequential 5 mm axial images of the head without contrast.  Coronal and sagittal reformatted imaging from the axial acquisition.    COMPARISON:  10/04/2014    FINDINGS:  There is no evidence for acute intracranial hemorrhage or sulcal effacement.  The ventricles are normal in size without hydrocephalus.  There is no  midline shift or mass effect.  Visualized paranasal sinuses and mastoid air cells are clear.                                Medical Decision Making:   Initial Assessment:   Andrew Velazquez is a 58 y.o. male who presents with complaint of emesis and dizziness.    Differential Diagnosis:   ICH, SAH, CVA, peripheral vertigo, hypertensive emergency  Independently Interpreted Test(s):   I have ordered and independently interpreted X-rays - see prior notes.  I have ordered and independently interpreted EKG Reading(s) - see prior notes  Clinical Tests:   Lab Tests: Reviewed       <> Summary of Lab: Benign  ED Management:  Discussed with interventional neurologist, who agrees with admission for observation and MRI.  Patient given hydralazine as well as some Compazine and reports feeling much better although his symptoms are still present.  Informed him of plan to admit for observation for MRI in the morning.  Discussed with Dr. parker powell, who agrees with admission for observation.                      Clinical Impression:     1. Dizziness    2. Headache            Disposition:   Disposition: Placed in Observation  Condition: Stable       Scribe Attestation I, Dr. Naman Guadalupe, personally performed the services described in this documentation. All medical record entries made by the scribe were at my direction and in my presence.  I have reviewed the chart and agree that the record reflects my personal performance and is accurate and complete. Naman Guadalupe MD.  4:33 PM 11/22/2018                     Naman Guadalupe MD  11/22/18 [id number]

## 2018-11-23 VITALS
BODY MASS INDEX: 25.75 KG/M2 | HEART RATE: 81 BPM | OXYGEN SATURATION: 100 % | HEIGHT: 70 IN | WEIGHT: 179.88 LBS | TEMPERATURE: 98 F | SYSTOLIC BLOOD PRESSURE: 165 MMHG | DIASTOLIC BLOOD PRESSURE: 84 MMHG | RESPIRATION RATE: 17 BRPM

## 2018-11-23 PROBLEM — R42 DIZZINESS: Status: RESOLVED | Noted: 2018-11-22 | Resolved: 2018-11-23

## 2018-11-23 PROBLEM — R11.2 NAUSEA AND VOMITING: Status: RESOLVED | Noted: 2018-11-22 | Resolved: 2018-11-23

## 2018-11-23 PROBLEM — I16.0 HYPERTENSIVE URGENCY: Status: RESOLVED | Noted: 2018-11-22 | Resolved: 2018-11-23

## 2018-11-23 LAB
ALBUMIN SERPL BCP-MCNC: 3.2 G/DL
ALP SERPL-CCNC: 105 U/L
ALT SERPL W/O P-5'-P-CCNC: 17 U/L
ANION GAP SERPL CALC-SCNC: 8 MMOL/L
AORTIC ROOT ANNULUS: 3.69 CM
AST SERPL-CCNC: 19 U/L
AV INDEX (PROSTH): 0.71
AV MEAN GRADIENT: 4.89 MMHG
AV PEAK GRADIENT: 9.73 MMHG
AV VALVE AREA: 2.61 CM2
BILIRUB SERPL-MCNC: 0.6 MG/DL
BSA FOR ECHO PROCEDURE: 2.01 M2
BUN SERPL-MCNC: 18 MG/DL
CALCIUM SERPL-MCNC: 9 MG/DL
CHLORIDE SERPL-SCNC: 105 MMOL/L
CHOLEST SERPL-MCNC: 187 MG/DL
CHOLEST/HDLC SERPL: 4.7 {RATIO}
CO2 SERPL-SCNC: 25 MMOL/L
CREAT SERPL-MCNC: 1.2 MG/DL
CV ECHO LV RWT: 0.44 CM
DOP CALC AO PEAK VEL: 1.56 M/S
DOP CALC AO VTI: 32.51 CM
DOP CALC LVOT AREA: 3.66 CM2
DOP CALC LVOT DIAMETER: 2.16 CM
DOP CALC LVOT STROKE VOLUME: 84.97 CM3
DOP CALCLVOT PEAK VEL VTI: 23.2 CM
E WAVE DECELERATION TIME: 239.49 MSEC
E/A RATIO: 1.05
ECHO LV POSTERIOR WALL: 1.07 CM (ref 0.6–1.1)
EST. GFR  (AFRICAN AMERICAN): >60 ML/MIN/1.73 M^2
EST. GFR  (NON AFRICAN AMERICAN): >60 ML/MIN/1.73 M^2
ESTIMATED AVG GLUCOSE: 97 MG/DL
FRACTIONAL SHORTENING: 27 % (ref 28–44)
GLUCOSE SERPL-MCNC: 98 MG/DL
HBA1C MFR BLD HPLC: 5 %
HDLC SERPL-MCNC: 40 MG/DL
HDLC SERPL: 21.4 %
INTERVENTRICULAR SEPTUM: 0.92 CM (ref 0.6–1.1)
LA MAJOR: 5.27 CM
LA MINOR: 5.36 CM
LA WIDTH: 4.14 CM
LDLC SERPL CALC-MCNC: 122.8 MG/DL
LEFT ATRIUM SIZE: 3.89 CM
LEFT ATRIUM VOLUME INDEX: 36.2 ML/M2
LEFT ATRIUM VOLUME: 72.75 CM3
LEFT INTERNAL DIMENSION IN SYSTOLE: 3.57 CM (ref 2.1–4)
LEFT VENTRICLE DIASTOLIC VOLUME INDEX: 55.12 ML/M2
LEFT VENTRICLE DIASTOLIC VOLUME: 110.8 ML
LEFT VENTRICLE MASS INDEX: 85.8 G/M2
LEFT VENTRICLE SYSTOLIC VOLUME INDEX: 26.5 ML/M2
LEFT VENTRICLE SYSTOLIC VOLUME: 53.27 ML
LEFT VENTRICULAR INTERNAL DIMENSION IN DIASTOLE: 4.86 CM (ref 3.5–6)
LEFT VENTRICULAR MASS: 172.51 G
LV LATERAL E/E' RATIO: 10.75
MAGNESIUM SERPL-MCNC: 1.9 MG/DL
MV PEAK A VEL: 0.82 M/S
MV PEAK E VEL: 0.86 M/S
NONHDLC SERPL-MCNC: 147 MG/DL
PHOSPHATE SERPL-MCNC: 3.6 MG/DL
PISA TR MAX VEL: 2.5 M/S
POTASSIUM SERPL-SCNC: 3.6 MMOL/L
PROT SERPL-MCNC: 7.1 G/DL
PULM VEIN S/D RATIO: 1.19
PV PEAK D VEL: 0.47 M/S
PV PEAK S VEL: 0.56 M/S
RA MAJOR: 4.4 CM
RA PRESSURE: 3 MMHG
RIGHT VENTRICULAR END-DIASTOLIC DIMENSION: 3.27 CM
SODIUM SERPL-SCNC: 138 MMOL/L
TDI LATERAL: 0.08
TR MAX PG: 25 MMHG
TRIGL SERPL-MCNC: 121 MG/DL
TSH SERPL DL<=0.005 MIU/L-ACNC: 1.13 UIU/ML
TV REST PULMONARY ARTERY PRESSURE: 28 MMHG

## 2018-11-23 PROCEDURE — 96376 TX/PRO/DX INJ SAME DRUG ADON: CPT | Mod: 59

## 2018-11-23 PROCEDURE — 92610 EVALUATE SWALLOWING FUNCTION: CPT

## 2018-11-23 PROCEDURE — 99900039 HC SLP GENERIC THERAPY SCREENING (STAT)

## 2018-11-23 PROCEDURE — 97161 PT EVAL LOW COMPLEX 20 MIN: CPT

## 2018-11-23 PROCEDURE — G8980 MOBILITY D/C STATUS: HCPCS | Mod: CJ

## 2018-11-23 PROCEDURE — 84443 ASSAY THYROID STIM HORMONE: CPT

## 2018-11-23 PROCEDURE — 25000003 PHARM REV CODE 250: Performed by: HOSPITALIST

## 2018-11-23 PROCEDURE — 83735 ASSAY OF MAGNESIUM: CPT

## 2018-11-23 PROCEDURE — 83036 HEMOGLOBIN GLYCOSYLATED A1C: CPT

## 2018-11-23 PROCEDURE — G8978 MOBILITY CURRENT STATUS: HCPCS | Mod: CJ

## 2018-11-23 PROCEDURE — G8989 SELF CARE D/C STATUS: HCPCS | Mod: CI

## 2018-11-23 PROCEDURE — 80061 LIPID PANEL: CPT

## 2018-11-23 PROCEDURE — G8988 SELF CARE GOAL STATUS: HCPCS | Mod: CI

## 2018-11-23 PROCEDURE — 63600175 PHARM REV CODE 636 W HCPCS: Performed by: PHYSICIAN ASSISTANT

## 2018-11-23 PROCEDURE — 84100 ASSAY OF PHOSPHORUS: CPT

## 2018-11-23 PROCEDURE — G0378 HOSPITAL OBSERVATION PER HR: HCPCS

## 2018-11-23 PROCEDURE — 97165 OT EVAL LOW COMPLEX 30 MIN: CPT

## 2018-11-23 PROCEDURE — 25500020 PHARM REV CODE 255: Performed by: HOSPITALIST

## 2018-11-23 PROCEDURE — G8979 MOBILITY GOAL STATUS: HCPCS | Mod: CJ

## 2018-11-23 PROCEDURE — A4216 STERILE WATER/SALINE, 10 ML: HCPCS | Performed by: HOSPITALIST

## 2018-11-23 PROCEDURE — G8987 SELF CARE CURRENT STATUS: HCPCS | Mod: CI

## 2018-11-23 PROCEDURE — 36415 COLL VENOUS BLD VENIPUNCTURE: CPT

## 2018-11-23 PROCEDURE — 80053 COMPREHEN METABOLIC PANEL: CPT

## 2018-11-23 PROCEDURE — A9585 GADOBUTROL INJECTION: HCPCS | Performed by: HOSPITALIST

## 2018-11-23 RX ORDER — GADOBUTROL 604.72 MG/ML
10 INJECTION INTRAVENOUS
Status: COMPLETED | OUTPATIENT
Start: 2018-11-23 | End: 2018-11-23

## 2018-11-23 RX ORDER — HYDRALAZINE HYDROCHLORIDE 20 MG/ML
10 INJECTION INTRAMUSCULAR; INTRAVENOUS EVERY 6 HOURS PRN
Status: DISCONTINUED | OUTPATIENT
Start: 2018-11-23 | End: 2018-11-23 | Stop reason: HOSPADM

## 2018-11-23 RX ADMIN — HYDRALAZINE HYDROCHLORIDE 10 MG: 20 INJECTION INTRAMUSCULAR; INTRAVENOUS at 12:11

## 2018-11-23 RX ADMIN — Medication 3 ML: at 03:11

## 2018-11-23 RX ADMIN — HYDROCHLOROTHIAZIDE 25 MG: 25 TABLET ORAL at 08:11

## 2018-11-23 RX ADMIN — GABAPENTIN 300 MG: 300 CAPSULE ORAL at 03:11

## 2018-11-23 RX ADMIN — GABAPENTIN 300 MG: 300 CAPSULE ORAL at 08:11

## 2018-11-23 RX ADMIN — METOPROLOL SUCCINATE 50 MG: 50 TABLET, EXTENDED RELEASE ORAL at 08:11

## 2018-11-23 RX ADMIN — SULFAMETHOXAZOLE AND TRIMETHOPRIM 1 TABLET: 800; 160 TABLET ORAL at 08:11

## 2018-11-23 RX ADMIN — NIFEDIPINE 90 MG: 30 TABLET, FILM COATED, EXTENDED RELEASE ORAL at 08:11

## 2018-11-23 RX ADMIN — ASPIRIN 81 MG: 81 TABLET, COATED ORAL at 08:11

## 2018-11-23 RX ADMIN — IRBESARTAN 300 MG: 150 TABLET ORAL at 08:11

## 2018-11-23 RX ADMIN — Medication 3 ML: at 06:11

## 2018-11-23 RX ADMIN — GADOBUTROL 10 ML: 604.72 INJECTION INTRAVENOUS at 10:11

## 2018-11-23 RX ADMIN — ATORVASTATIN CALCIUM 40 MG: 20 TABLET, FILM COATED ORAL at 08:11

## 2018-11-23 NOTE — ASSESSMENT & PLAN NOTE
-Followed by Dr. Marisol Tejada with ID  -Last CD4 count 149 on 9/24/18  -Patient takes home meds intermittently  -Home meds Triumeq and Prezcobix not on formulary here  -Continue bactrim daily

## 2018-11-23 NOTE — PLAN OF CARE
Problem: Physical Therapy Goal  Goal: Physical Therapy Goal  Outcome: Outcome(s) achieved Date Met: 11/23/18  Patient with good, equal strength BLEs; good coordination, occasional decreased balance with capacity for self recovery; pt has std cane that he can use; recommend OP PT for balance, conditioning.

## 2018-11-23 NOTE — PLAN OF CARE
Problem: Occupational Therapy Goal  Goal: Occupational Therapy Goal  Outcome: Outcome(s) achieved Date Met: 11/23/18  Pt performing at baseline for ADLs. BUE ROM/MMT/FMC/GMC/sensation all WFL. Pt reports no visual deficits.Pt does report concerns and demonstrates deficits in balance; Veering to R and LOB x 2 with Min A/CGA to correct self to midline. Pt's functional mobility increasing to CGA- SBA with increased distance in open area. PT to further address and follow for d/c/DME recs.   Pt does not require further OT services at this time. D/c OT.

## 2018-11-23 NOTE — PLAN OF CARE
Future Appointments   Date Time Provider Department Center   11/29/2018  2:15 PM Marisol Tejada MD [facility] IN[facility]ECT [facility]   1/3/2019  9:15 AM [facility]   1/17/2019  1:15 PM Marisol Tejada MD [facility]   4/15/2019  9:30 AM ORTHO KARLA LARRY [facility]        11/23/18 1515   Final Note   Assessment Type Final Discharge Note   Hospital Follow Up  Appt(s) scheduled? Yes   Discharge plans and expectations educations in teach back method with documentation complete? Yes   Right Care Referral Info   Post Acute Recommendation No Care     Ruth Lal, RN, CCM, CMSRN  RN Transition Navigator  [phone]

## 2018-11-23 NOTE — HOSPITAL COURSE
Patient was monitored overnight in the CDU. Nausea and vomiting resolved. Afebrile. Normal neuro checks. Lipid panel normal. TSH and A1C WNL. MRI brain showed no acute intracranial abnormality, a remote lacunar type infarct in the left christy. MRA head/neck showed no significant large vessel stenosis, aneurysm, or vascular malformation. TTE showed normal EF of 65%, grade 1 diastolic dysfunction, negative bubble study. Blood pressures remained elevated and patient reported intermittent adherence to home meds. His home meds were resumed and he was given hydralazine IV prn. He was educated on strict adherence to home blood pressure regimen. SLP and OT were consulted and found the patient to be at his baseline with no further needs. PT recommended outpatient PT for balance and conditioning. Hemodynamically stable and ready for discharge. Patient was discharged to home and will follow-up with his PCP.

## 2018-11-23 NOTE — H&P
[facility]  History & Physical    Patient Name: Andrew Velazquez  MRN: [id number]  Admission Date: 11/22/2018  Attending Physician: Shady Guillen MD   Primary Care Provider: Marisol Tejada MD    Patient information was obtained from patient, past medical records and ER records.     Subjective:     Principal Problem:<principal problem not specified>    Chief Complaint:   Chief Complaint   Patient presents with    Vomiting     Onset 0200 last night with vomiting, difficulty walking, frontal headache.         HPI: Mr. Andrew Velazquez is a 57 yo black male with HTN, HLD, AIDS with last CD4 149 on bactrim prophylaxis, GERD, JANA, and COPD who presents today with dizziness for stroke rule out. He reports that he has experienced progressively persistent imbalance and ataxia accompanied by generalized weakness, fatigue, and several episodes of nausea and vomiting that began last night around 2am. He denies fever, chills, headache, chest pain, shortness of breath, abdominal pain, diarrhea, changes in speech, changes in vision, numbness or tingling, or any other acute symptoms. He denies any recent trauma. Patient does report that he has been taking care of a friend this week who has had nausea, vomiting and diarrhea. He does not take his medications regularly. On presentation to the ED his BP was 222/103 and remained elevated despite two doses of hydralazine. Code stroke called d/t pt's imbalance. Vascular neurologist Dr. Byers felt sx were not focal and could represent posterior circulation event vs hypertensive encephalopathy, PRES, or opportunistic infection. Labs were unremarkable. CT head and CXR showed nothing acute. He was given prochlorperazine and a 500 mL IV fluid bolus with improvement in nausea and vomiting. He was admitted to [facility] for further evaluation in the CDU.    Past Medical History:   Diagnosis Date    Arthritis     Back pain     Chronic  kidney disease     stage 3    COPD (chronic obstructive pulmonary disease)     Esophageal reflux     HIV (human immunodeficiency virus infection)     2006    HIV infection     Hypertension     Lumbago     Psoriasis     Skin disorder     Tobacco use        Past Surgical History:   Procedure Laterality Date    COLONOSCOPY N/A 4/20/2015    Performed by Kelly [facility]obos MD at [facility]    FINGER SURGERY      TONSILLECTOMY         Review of patient's allergies indicates:   Allergen Reactions    Penicillins Hives    Sustiva [efavirenz] Other (See Comments)     insomnia       No current facility-administered medications on file prior to encounter.      Current Outpatient Medications on File Prior to Encounter   Medication Sig    abacavir-dolutegravir-lamivud (TRIUMEQ) 600- mg Tab Take 1 tablet by mouth once daily.    albuterol 90 mcg/actuation inhaler Inhale 1-2 puffs into the lungs every 6 (six) hours as needed for Wheezing (sob).    aspirin (ECOTRIN) 81 MG EC tablet Take 81 mg by mouth once daily.    b complex vitamins tablet Take 1 tablet by mouth once daily.    darunavir-cobicistat (PREZCOBIX) 800-150 mg-mg Tab Take 1 tablet by mouth once daily. Also need to continue triumeq    ferrous sulfate 325 mg (65 mg iron) Tab tablet Take 1 tablet (325 mg total) by mouth once daily.    fluocinonide 0.05% (LIDEX) 0.05 % cream Apply 1 application topically 2 (two) times daily.    gabapentin (NEURONTIN) 300 MG capsule Take 1 capsule (300 mg total) by mouth 3 (three) times daily.    hydroCHLOROthiazide (HYDRODIURIL) 25 MG tablet Take 25 mg by mouth once daily.    HYDROcodone-acetaminophen (NORCO) 7.5-325 mg per tablet Take 1 tablet by mouth every 6 (six) hours as needed for Pain. Do not fill until 11/3/18    irbesartan (AVAPRO) 300 MG tablet Take 300 mg by mouth once daily.    metoprolol succinate (TOPROL-XL) 50 MG 24 hr tablet Take 1 tablet (50 mg total) by mouth once daily.    naproxen  (NAPROSYN) 500 MG tablet Take 1 tablet (500 mg total) by mouth 2 (two) times daily with meals.    NIFEdipine (PROCARDIA-XL) 90 MG (OSM) 24 hr tablet Take 90 mg by mouth once daily.    pravastatin (PRAVACHOL) 40 MG tablet Take 1 tablet (40 mg total) by mouth every evening.    sulfamethoxazole-trimethoprim 800-160mg (BACTRIM DS) 800-160 mg Tab Take 1 tablet by mouth once daily.    triamcinolone acetonide 0.1% (KENALOG) 0.1 % cream Apply topically 2 (two) times daily.    TRIUMEQ 600- mg Tab TAKE 1 TABLET BY MOUTH EVERY DAY    zolpidem (AMBIEN) 10 mg Tab Take 1 tablet (10 mg total) by mouth nightly.    [DISCONTINUED] nebivolol (BYSTOLIC) 10 MG Tab Take 1 tablet (10 mg total) by mouth once daily.     Family History     Problem Relation (Age of Onset)    Arthritis Mother, Sister    Hypertension Mother, Sister    No Known Problems Father        Tobacco Use    Smoking status: Current Every Day Smoker     Packs/day: 0.25     Years: 40.00     Pack years: 10.00     Types: Cigarettes    Smokeless tobacco: Never Used   Substance and Sexual Activity    Alcohol use: Yes     Alcohol/week: 0.0 oz     Comment: occassional beer; pt drank beer this am    Drug use: No    Sexual activity: No     Partners: Male     Birth control/protection: Condom     Review of Systems   Constitutional: Positive for diaphoresis and fatigue. Negative for chills and fever.   HENT: Negative for drooling, trouble swallowing and voice change.    Eyes: Negative for photophobia and visual disturbance.   Respiratory: Negative for cough and shortness of breath.    Cardiovascular: Negative for chest pain, palpitations and leg swelling.   Gastrointestinal: Positive for nausea and vomiting. Negative for abdominal distention, abdominal pain and diarrhea.   Endocrine: Negative for cold intolerance, heat intolerance, polydipsia, polyphagia and polyuria.   Genitourinary: Negative for difficulty urinating, dysuria, flank pain and hematuria.    Musculoskeletal: Positive for arthralgias and gait problem. Negative for myalgias, neck pain and neck stiffness.   Skin: Negative for color change, pallor, rash and wound.   Allergic/Immunologic: Positive for immunocompromised state. Negative for environmental allergies and food allergies.   Neurological: Positive for dizziness and weakness (generalized). Negative for syncope, speech difficulty, numbness and headaches.   Hematological: Negative for adenopathy. Does not bruise/bleed easily.   Psychiatric/Behavioral: Negative for confusion. The patient is not nervous/anxious.    All other systems reviewed and are negative.    Objective:     Vital Signs (Most Recent):  Pulse: 92 (11/22/18 1741)  Resp: (!) 25 (11/22/18 1741)  BP: (!) 194/96 (11/22/18 1741)  SpO2: 100 % (11/22/18 1741) Vital Signs (24h Range):  Pulse:  [60-92] 92  Resp:  [14-31] 25  SpO2:  [99 %-100 %] 100 %  BP: (164-222)/() 194/96     Weight: 81.6 kg (180 lb)  Body mass index is 25.83 kg/m².    Physical Exam   Constitutional: He is oriented to person, place, and time. He appears well-developed and well-nourished. No distress.   HENT:   Head: Normocephalic and atraumatic.   Right Ear: External ear normal.   Left Ear: External ear normal.   Nose: Nose normal.   Mouth/Throat: Oropharynx is clear and moist. Abnormal dentition.   Eyes: EOM are normal. Pupils are equal, round, and reactive to light. Right conjunctiva is injected. Left conjunctiva is injected.   Neck: Normal range of motion. Neck supple.   Cardiovascular: Normal rate, regular rhythm, normal heart sounds and intact distal pulses.   No murmur heard.  Pulmonary/Chest: Effort normal and breath sounds normal. No respiratory distress. He has no wheezes. He has no rales.   Abdominal: Soft. Bowel sounds are normal. He exhibits no distension and no mass. There is no tenderness. There is no guarding.   Musculoskeletal: Normal range of motion. He exhibits no edema, tenderness or deformity.    Neurological: He is alert and oriented to person, place, and time. He has normal strength. He exhibits normal muscle tone. GCS eye subscore is 4. GCS verbal subscore is 5. GCS motor subscore is 6.   No pronator drift. No nystagmus. Patient dizzy upon standing, unable to assess gait. No dysarthria. No visual field deficits noted; able to accurately count fingers with both eyes. Strength normal and equal in all extremities bilaterally.   Skin: Skin is warm and dry. Capillary refill takes less than 2 seconds. He is not diaphoretic.   Psychiatric: He has a normal mood and affect. His behavior is normal. Judgment and thought content normal.   Nursing note and vitals reviewed.        CRANIAL NERVES     CN I  cranial nerve I not tested    CN II   Right visual field deficit: none  Left visual field deficit: none     CN III, IV, VI   Pupils are equal, round, and reactive to light.  Extraocular motions are normal.   Nystagmus: none   Upgaze: normal  Downgaze: normal    CN VII   Facial expression full, symmetric.     CN VIII   Hearing: intact    CN IX, X   CN IX normal.     CN XI   CN XI normal.     CN XII   CN XII normal.        Significant Labs: All pertinent labs within the past 24 hours have been reviewed.    Significant Imaging: I have reviewed all pertinent imaging results/findings within the past 24 hours.     Ct Head Without Contrast    Result Date: 11/22/2018  EXAMINATION: CT HEAD WITHOUT CONTRAST CLINICAL HISTORY: Headache, acute, norm neuro exam; TECHNIQUE: Multiple sequential 5 mm axial images of the head without contrast.  Coronal and sagittal reformatted imaging from the axial acquisition. COMPARISON: 10/04/2014 FINDINGS: There is no evidence for acute intracranial hemorrhage or sulcal effacement.  The ventricles are normal in size without hydrocephalus.  There is no midline shift or mass effect.  Visualized paranasal sinuses and mastoid air cells are clear.     Unremarkable noncontrast CT head specifically  without evidence for acute intracranial hemorrhage.  Clinical correlation and further evaluation as warranted.     Electronically signed by:Samantha Vo MD Date:11/22/2018 Time:14:31    X-ray Chest Ap Portable    Result Date: 11/22/2018  EXAMINATION: XR CHEST AP PORTABLE CLINICAL HISTORY: Stroke; TECHNIQUE: Single frontal view of the chest was performed. COMPARISON: 09/02/2018 FINDINGS: chronic lung changes are seen bilaterally.  No consolidation or pleural effusions.  The heart is normal in size.  The skeletal structures are intact.     As above.    Electronically signed by:Samantha Vo MD Date:11/22/2018 Time:16:08      Assessment/Plan:     Dizziness    Nausea and vomiting  57 y/o male with acute onset of dizziness, generalized weakness, diaphoresis, nausea, vomiting and unsteady gait last night around 2 am. Afebrile. BP >200s in the ED. Labs unremarkable. CXR negative. CT head negative for acute intracranial abnormality. Had telestroke eval in the ED.    - Check TSH, lipid profile, HbA1c    - ASA, statin  - MRI/MRA, Echo  - BP control  - Clear liquid diet and advance as tolerated  - Antiemetics prn  - Monitor on telemetry  - PT/OT/ST  - See Stroke Telemedicine note     Hypertensive urgency    Essential hypertension  BP elevated >200 in the ED. Patient does not take home meds.  -Continue home hydrochlorothiazide, irbesartan, metoprolol, and nifedipine  -PRN labetalol for SBP >220         COPD (chronic obstructive pulmonary disease)    No acute respiratory complaints or hypoxia. Lungs CTA and chest XR clear.       HLD (hyperlipidemia)    Patient takes pravastatin 40 mg PO daily at home  -Check lipid panel  -Give atorvastatin 40 mg PO daily       Neuropathy    Continue home gabapentin       AIDS (acquired immune deficiency syndrome)    -Followed by Dr. Marisol Tejada with ID  -Last CD4 count 149 on 9/24/18  -Patient takes home meds intermittently  -Home meds Triumeq and Prezcobix not on formulary here  -Continue  bactrim daily         VTE Risk Mitigation (From admission, onward)        Ordered     IP VTE LOW RISK PATIENT  Once      11/22/18 1811     Place sequential compression device  Until discontinued      11/22/18 1811     Place KARLA hose  Until discontinued      11/22/18 1811          Naomy Meléndez PA-C  Department of Hospital Medicine   [facility]

## 2018-11-23 NOTE — ASSESSMENT & PLAN NOTE
Essential hypertension  -193. Patient has not been taking home meds consistently.  -Continue home hydrochlorothiazide, irbesartan, metoprolol, and nifedipine  -PRN hydralazine for SBP >185

## 2018-11-23 NOTE — DISCHARGE SUMMARY
[facility]  Discharge Summary      Patient Name: Andrew Velazquez  MRN: [id number]  Admission Date: 11/22/2018  Hospital Length of Stay: 1 days  Discharge Date and Time: 11/23/2018  4:02 PM  Attending Physician: Shady Guillen MD   Discharging Provider: Naomy Meléndez PA-C  Primary Care Provider: Marisol Tejada MD      HPI:   Mr. Andrew Velazquez is a 57 yo black male with HTN, HLD, AIDS with last CD4 149 on bactrim prophylaxis, GERD, JANA, and COPD who presents today with dizziness for stroke rule out. He reports that he has experienced progressively persistent imbalance and ataxia accompanied by generalized weakness, fatigue, and several episodes of nausea and vomiting that began last night around 2am. He denies fever, chills, headache, chest pain, shortness of breath, abdominal pain, diarrhea, changes in speech, changes in vision, numbness or tingling, or any other acute symptoms. He denies any recent trauma. Patient does report that he has been taking care of a friend this week who has had nausea, vomiting and diarrhea. He does not take his medications regularly. On presentation to the ED his BP was 222/103 and remained elevated despite two doses of hydralazine. Code stroke called d/t pt's imbalance. Vascular neurologist Dr. Byers felt sx were not focal and could represent posterior circulation event vs hypertensive encephalopathy, PRES, or opportunistic infection. Labs were unremarkable. CT head and CXR showed nothing acute. He was given prochlorperazine and a 500 mL IV fluid bolus with improvement in nausea and vomiting. He was admitted to [facility] for further evaluation in the CDU.    * No surgery found *      Hospital Course:   Patient was monitored overnight in the CDU. Nausea and vomiting resolved. Afebrile. Normal neuro checks. Lipid panel normal. TSH and A1C WNL. MRI brain showed no acute intracranial abnormality, a remote lacunar type infarct in the  left christy. MRA head/neck showed no significant large vessel stenosis, aneurysm, or vascular malformation. TTE showed normal EF of 65%, grade 1 diastolic dysfunction, negative bubble study. Blood pressures remained elevated and patient reported intermittent adherence to home meds. His home meds were resumed and he was given hydralazine IV prn. He was educated on strict adherence to home blood pressure regimen. SLP and OT were consulted and found the patient to be at his baseline with no further needs. PT recommended outpatient PT for balance and conditioning. Hemodynamically stable and ready for discharge. Patient was discharged to home and will follow-up with his PCP.     Consults:   Consults (From admission, onward)        Status Ordering Provider     Inpatient consult to Registered Dietitian/Nutritionist  Once     Provider:  (Not yet assigned)    Completed BOLIVAR DELACRUZ     Inpatient Telestroke rounding  Once     Provider:  (Not yet assigned)    Acknowledged AIDEE MATTHEWS     IP consult to case management/social work  Once     Provider:  (Not yet assigned)    Completed BOLIVAR DELACRUZ          Final Active Diagnoses:    Diagnosis Date Noted POA    HLD (hyperlipidemia) [E78.5] 07/15/2014 Yes    HTN (hypertension) [I10] 07/15/2014 Yes     Chronic    COPD (chronic obstructive pulmonary disease) [J44.9] 07/15/2014 Yes     Chronic    AIDS (acquired immune deficiency syndrome) [B20] 07/15/2014 Yes     Chronic    Neuropathy [G62.9] 07/15/2014 Yes    Tobacco abuse [Z72.0] 07/15/2014 Yes      Problems Resolved During this Admission:    Diagnosis Date Noted Date Resolved POA    PRINCIPAL PROBLEM:  Dizziness [R42] 11/22/2018 11/23/2018 Yes    Hypertensive urgency [I16.0] 11/22/2018 11/23/2018 Yes    Nausea and vomiting [R11.2] 11/22/2018 11/23/2018 Yes       Discharged Condition: fair    Disposition: Home or Self Care    Follow Up:  Follow-up Information     Marisol Tejada MD.    Specialties:   Infectious Diseases, Obstetrics and Gynecology  Contact information:  [address] [address]  [phone]                 Patient Instructions:      Referral to OutPatient  Physical therapy   Referral Priority: Routine Referral Type: Physical Medicine   Referral Reason: Specialty Services Required   Requested Specialty: Physical Therapy   Number of Visits Requested: 1     Diet Cardiac     Notify your health care provider if you experience any of the following:  temperature >100.4     Notify your health care provider if you experience any of the following:  persistent nausea and vomiting or diarrhea     Notify your health care provider if you experience any of the following:  persistent dizziness, light-headedness, or visual disturbances     Notify your health care provider if you experience any of the following:  increased confusion or weakness     Notify your health care provider if you experience any of the following:  severe persistent headache     Notify your health care provider if you experience any of the following:  difficulty breathing or increased cough     Activity as tolerated       Significant Diagnostic Studies: Labs: All labs within the past 24 hours have been reviewed    Pending Diagnostic Studies:     None         Medications:  Reconciled Home Medications:      Medication List      CONTINUE taking these medications    albuterol 90 mcg/actuation inhaler  Commonly known as:  PROVENTIL/VENTOLIN HFA  Inhale 1-2 puffs into the lungs every 6 (six) hours as needed for Wheezing (sob).     aspirin 81 MG EC tablet  Commonly known as:  ECOTRIN  Take 81 mg by mouth once daily.     b complex vitamins tablet  Take 1 tablet by mouth once daily.     darunavir-cobicistat 800-150 mg-mg Tab  Commonly known as:  PREZCOBIX  Take 1 tablet by mouth once daily. Also need to continue triumeq     ferrous sulfate 325 mg (65 mg iron) Tab tablet  Commonly known as:  FEOSOL  Take 1 tablet (325 mg total) by mouth  once daily.     fluocinonide 0.05% 0.05 % cream  Commonly known as:  LIDEX  Apply 1 application topically 2 (two) times daily.     gabapentin 300 MG capsule  Commonly known as:  NEURONTIN  Take 1 capsule (300 mg total) by mouth 3 (three) times daily.     hydroCHLOROthiazide 25 MG tablet  Commonly known as:  HYDRODIURIL  Take 25 mg by mouth once daily.     HYDROcodone-acetaminophen 7.5-325 mg per tablet  Commonly known as:  NORCO  Take 1 tablet by mouth every 6 (six) hours as needed for Pain. Do not fill until 11/3/18     irbesartan 300 MG tablet  Commonly known as:  AVAPRO  Take 300 mg by mouth once daily.     metoprolol succinate 50 MG 24 hr tablet  Commonly known as:  TOPROL-XL  Take 1 tablet (50 mg total) by mouth once daily.     naproxen 500 MG tablet  Commonly known as:  NAPROSYN  Take 1 tablet (500 mg total) by mouth 2 (two) times daily with meals.     NIFEdipine 90 MG (OSM) 24 hr tablet  Commonly known as:  PROCARDIA-XL  Take 90 mg by mouth once daily.     pravastatin 40 MG tablet  Commonly known as:  PRAVACHOL  Take 1 tablet (40 mg total) by mouth every evening.     sulfamethoxazole-trimethoprim 800-160mg 800-160 mg Tab  Commonly known as:  BACTRIM DS  Take 1 tablet by mouth once daily.     triamcinolone acetonide 0.1% 0.1 % cream  Commonly known as:  KENALOG  Apply topically 2 (two) times daily.     * TRIUMEQ 600- mg Tab  Generic drug:  abacavir-dolutegravir-lamivud  TAKE 1 TABLET BY MOUTH EVERY DAY     * abacavir-dolutegravir-lamivud 600- mg Tab  Commonly known as:  TRIUMEQ  Take 1 tablet by mouth once daily.     zolpidem 10 mg Tab  Commonly known as:  AMBIEN  Take 1 tablet (10 mg total) by mouth nightly.         * This list has 2 medication(s) that are the same as other medications prescribed for you. Read the directions carefully, and ask your doctor or other care provider to review them with you.                Indwelling Lines/Drains at time of discharge:   Lines/Drains/Airways           None          Time spent on the discharge of patient: 35 minutes  Patient was seen and examined on the date of discharge and determined to be suitable for discharge.       NORA Shepard MD  [facility]  MD Errol Rangel MD Elizabeth Knipp, PA-C Brittany Chatman, NP Kristin Stein, PA-C Arekeva Selmon, NP-C  [address] [place] [id number]  Office Phone: [phone]  Office Fax: [phone]

## 2018-11-23 NOTE — PT/OT/SLP EVAL
"Occupational Therapy   Evaluation and Discharge Note    Name: Andrew Velazquez  MRN: [id number]  Admitting Diagnosis:  Dizziness      Recommendations:     Discharge Recommendations: (per PT)  Discharge Equipment Recommendations:  (per PT recs)  Barriers to discharge:  None    History:     Occupational Profile:  Living Environment: Pt lives with nephew in [facility], no steps, tub/shower combo  Previous level of function: reports independence; disabled; drives  Equipment Owned:  cane, straight  Assistance upon Discharge: reports from nephew if required- nephew works nights     Past Medical History:   Diagnosis Date    Arthritis     Back pain     Chronic kidney disease     stage 3    COPD (chronic obstructive pulmonary disease)     Esophageal reflux     HIV (human immunodeficiency virus infection)     2006    HIV infection     Hypertension     Lumbago     Psoriasis     Skin disorder     Tobacco use        Past Surgical History:   Procedure Laterality Date    COLONOSCOPY N/A 4/20/2015    [facility]    FINGER SURGERY      TONSILLECTOMY         Subjective     Chief Complaint: Pt states, " everything is fine except my walking "   Patient/Family stated goals: return to PLOF; increase balance   Communicated with: nsg prior to session.  Pain/Comfort:  · Pain Rating 1: 0/10    Patients cultural, spiritual, Congregational conflicts given the current situation:      Objective:     Patient found with:      General Precautions: Standard, fall   Orthopedic Precautions:N/A   Braces: N/A     Occupational Performance:    Bed Mobility:    · Patient completed Scooting/Bridging with modified independence  · Patient completed Supine to Sit with modified independence  · Patient completed Sit to Supine with modified independence    Functional Mobility/Transfers:  · Patient completed Sit <> Stand Transfer with stand by assistance  with  no assistive device   · Functional Mobility: CGA- Min A without AD "     Activities of Daily Living:  · Upper Body Dressing: supervision    · Lower Body Dressing: supervision      Cognitive/Visual Perceptual:  Cognitive/Psychosocial Skills:     -       Oriented to: Person, Place, Situation and year with cues    -       Follows Commands/attention:Follows multistep  commands  -       Communication: clear/fluent  -       Memory: No Deficits noted  -       Safety awareness/insight to disability: intact   -       Mood/Affect/Coping skills/emotional control: Appropriate to situation    Physical Exam:  Balance:    -       WFL sitting balance; CGA- Min A ambulation   Postural examination/scapula alignment:    -       Rounded shoulders  Skin integrity: Visible skin intact  Edema:  None noted  Sensation:    -       Intact  Dominant hand:    -       right  Upper Extremity Range of Motion:   BUE ROM WFL   Upper Extremity Strength:  BUE strength WFL    Strength:  Good   FMC/GMC: WFL     Patient left in w/c with transport  with nsg notified    [facility] 6 Click:  [facility] Total Score: 23    Treatment & Education:  Pt performing axs as above   Pt with no assist required for seated ADLs and/or functional stand EOB   Pt did perform functional mobility in hallway and room; initially LOB x 2 with Min A to correct in small area; continued mobility in hallway with CGA- SBA without LOB   Education:    Assessment:     Andrew Velazquez is a 58 y.o. male with a medical diagnosis of Dizziness. At this time, patient is functioning at their prior level of function and does not require further acute OT services. Pt performing at baseline for ADLs. BUE ROM/MMT/FMC/GMC/sensation all WFL. Pt reports no visual deficits.Pt does report concerns and demonstrates deficits in balance; Veering to R and LOB x 2 with Min A/CGA to correct self to midline. Pt's functional mobility increasing to CGA- SBA with increased distance in open area. PT to further address and follow for d/c/DME recs.   Pt does not require further OT  "services at this time. D/c OT.     Clinical Decision Makin.  OT Low:  "Pt evaluation falls under low complexity for evaluation coding due to performance deficits noted in 1-3 areas as stated above and 0 co-morbities affecting current functional status. Data obtained from problem focused assessments. No modifications or assistance was required for completion of evaluation. Only brief occupational profile and history review completed."     Plan:     During this hospitalization, patient does not require further acute OT services.  Please re-consult if situation changes.    · Plan of Care Reviewed with: patient    This Plan of care has been discussed with the patient who was involved in its development and understands and is in agreement with the identified goals and treatment plan    GOALS:   Multidisciplinary Problems     Occupational Therapy Goals     Not on file          Multidisciplinary Problems (Resolved)        Problem: Occupational Therapy Goal    Goal Priority Disciplines Outcome Interventions   Occupational Therapy Goal   (Resolved)     OT, PT/OT Outcome(s) achieved                    Time Tracking:     OT Date of Treatment: 18  OT Start Time: 910  OT Stop Time: 925  OT Total Time (min): 15 min    Billable Minutes:Evaluation 15    Felicitas Gross OT  2018    "

## 2018-11-23 NOTE — PLAN OF CARE
Future Appointments   Date Time Provider Department Center   11/29/2018  2:15 PM Marisol Tejada MD [facility] INFECT [facility]   1/3/2019  9:15 AM [facility]   1/17/2019  1:15 PM Marisol Tejada MD [facility]   4/15/2019  9:30 AM ORTHO CLINIC, A [facility]O [facility]        11/23/18 1511   Discharge Assessment   Assessment Type Discharge Planning Assessment   Confirmed/corrected address and phone number on facesheet? Yes   Assessment information obtained from? Patient   Communicated expected length of stay with patient/caregiver yes   Prior to hospitilization cognitive status: Alert/Oriented   Prior to hospitalization functional status: Independent   Current cognitive status: Alert/Oriented   Current Functional Status: Independent   Lives With alone   Able to Return to Prior Arrangements yes   Patient's perception of discharge disposition home or selfcare   Readmission Within The Last 30 Days no previous admission in last 30 days   Patient currently being followed by outpatient case management? No   Patient currently receives any other outside agency services? No   Equipment Currently Used at Home cane, straight   Do you have any problems affording any of your prescribed medications? No   Is the patient taking medications as prescribed? yes   Does the patient have transportation home? Yes   Discharge Plan A Home;Home with family   Discharge Plan B Home   Patient/Family In Agreement With Plan yes     Ruth Lal RN, CCM, CMSRN  RN Transition Navigator  [phone]

## 2018-11-23 NOTE — PLAN OF CARE
Problem: SLP Goal  Goal: SLP Goal  Short Term Goals:  1. Pt will participate in BSS to determine least restrictive diet.- MET 11/23  2. Pt will participate in informal speech screen to determine if further evaluation is required to r/o cognitive-linguistic deficits.- MET 11/23    Outcome: Outcome(s) achieved Date Met: 11/23/18 11/23:  Pt participated in clinical swallow eval this PM. Pt tolerated thin liquids, puree, and hard solid textures with no overt s/s of aspiration, at bedside. Pt presents with oral and pharyngeal phases of the swallow judged to be WFL. Pt also demonstrates baseline cognitive-linguistic skills. SLP recs: Regular/thin liquids po diet with universal swallow precautions. ST services no longer required as pt has met all ST goals. SLP notified CECI King and YG Meléndez of results/recs. Please re-consult should pt experience any change in status.  MARGRET Taylor., CCC-SLP  Speech-Language Pathologist

## 2018-11-23 NOTE — SUBJECTIVE & OBJECTIVE
Past Medical History:   Diagnosis Date    Arthritis     Back pain     Chronic kidney disease     stage 3    COPD (chronic obstructive pulmonary disease)     Esophageal reflux     HIV (human immunodeficiency virus infection)     2006    HIV infection     Hypertension     Lumbago     Psoriasis     Skin disorder     Tobacco use        Past Surgical History:   Procedure Laterality Date    COLONOSCOPY N/A 4/20/2015    Performed by Kelly [facility]obos MD at [facility] ENDO    FINGER SURGERY      TONSILLECTOMY         Review of patient's allergies indicates:   Allergen Reactions    Penicillins Hives    Sustiva [efavirenz] Other (See Comments)     insomnia       No current facility-administered medications on file prior to encounter.      Current Outpatient Medications on File Prior to Encounter   Medication Sig    abacavir-dolutegravir-lamivud (TRIUMEQ) 600- mg Tab Take 1 tablet by mouth once daily.    albuterol 90 mcg/actuation inhaler Inhale 1-2 puffs into the lungs every 6 (six) hours as needed for Wheezing (sob).    aspirin (ECOTRIN) 81 MG EC tablet Take 81 mg by mouth once daily.    b complex vitamins tablet Take 1 tablet by mouth once daily.    darunavir-cobicistat (PREZCOBIX) 800-150 mg-mg Tab Take 1 tablet by mouth once daily. Also need to continue triumeq    ferrous sulfate 325 mg (65 mg iron) Tab tablet Take 1 tablet (325 mg total) by mouth once daily.    fluocinonide 0.05% (LIDEX) 0.05 % cream Apply 1 application topically 2 (two) times daily.    gabapentin (NEURONTIN) 300 MG capsule Take 1 capsule (300 mg total) by mouth 3 (three) times daily.    hydroCHLOROthiazide (HYDRODIURIL) 25 MG tablet Take 25 mg by mouth once daily.    HYDROcodone-acetaminophen (NORCO) 7.5-325 mg per tablet Take 1 tablet by mouth every 6 (six) hours as needed for Pain. Do not fill until 11/3/18    irbesartan (AVAPRO) 300 MG tablet Take 300 mg by mouth once daily.    metoprolol succinate (TOPROL-XL) 50 MG 24  hr tablet Take 1 tablet (50 mg total) by mouth once daily.    naproxen (NAPROSYN) 500 MG tablet Take 1 tablet (500 mg total) by mouth 2 (two) times daily with meals.    NIFEdipine (PROCARDIA-XL) 90 MG (OSM) 24 hr tablet Take 90 mg by mouth once daily.    pravastatin (PRAVACHOL) 40 MG tablet Take 1 tablet (40 mg total) by mouth every evening.    sulfamethoxazole-trimethoprim 800-160mg (BACTRIM DS) 800-160 mg Tab Take 1 tablet by mouth once daily.    triamcinolone acetonide 0.1% (KENALOG) 0.1 % cream Apply topically 2 (two) times daily.    TRIUMEQ 600- mg Tab TAKE 1 TABLET BY MOUTH EVERY DAY    zolpidem (AMBIEN) 10 mg Tab Take 1 tablet (10 mg total) by mouth nightly.    [DISCONTINUED] nebivolol (BYSTOLIC) 10 MG Tab Take 1 tablet (10 mg total) by mouth once daily.     Family History     Problem Relation (Age of Onset)    Arthritis Mother, Sister    Hypertension Mother, Sister    No Known Problems Father        Tobacco Use    Smoking status: Current Every Day Smoker     Packs/day: 0.25     Years: 40.00     Pack years: 10.00     Types: Cigarettes    Smokeless tobacco: Never Used   Substance and Sexual Activity    Alcohol use: Yes     Alcohol/week: 0.0 oz     Comment: occassional beer; pt drank beer this am    Drug use: No    Sexual activity: No     Partners: Male     Birth control/protection: Condom     Review of Systems   Constitutional: Positive for diaphoresis and fatigue. Negative for chills and fever.   HENT: Negative for drooling, trouble swallowing and voice change.    Eyes: Negative for photophobia and visual disturbance.   Respiratory: Negative for cough and shortness of breath.    Cardiovascular: Negative for chest pain, palpitations and leg swelling.   Gastrointestinal: Positive for nausea and vomiting. Negative for abdominal distention, abdominal pain and diarrhea.   Endocrine: Negative for cold intolerance, heat intolerance, polydipsia, polyphagia and polyuria.   Genitourinary: Negative  for difficulty urinating, dysuria, flank pain and hematuria.   Musculoskeletal: Positive for arthralgias and gait problem. Negative for myalgias, neck pain and neck stiffness.   Skin: Negative for color change, pallor, rash and wound.   Allergic/Immunologic: Positive for immunocompromised state. Negative for environmental allergies and food allergies.   Neurological: Positive for dizziness and weakness (generalized). Negative for syncope, speech difficulty, numbness and headaches.   Hematological: Negative for adenopathy. Does not bruise/bleed easily.   Psychiatric/Behavioral: Negative for confusion. The patient is not nervous/anxious.    All other systems reviewed and are negative.    Objective:     Vital Signs (Most Recent):  Pulse: 92 (11/22/18 1741)  Resp: (!) 25 (11/22/18 1741)  BP: (!) 194/96 (11/22/18 1741)  SpO2: 100 % (11/22/18 1741) Vital Signs (24h Range):  Pulse:  [60-92] 92  Resp:  [14-31] 25  SpO2:  [99 %-100 %] 100 %  BP: (164-222)/() 194/96     Weight: 81.6 kg (180 lb)  Body mass index is 25.83 kg/m².    Physical Exam   Constitutional: He is oriented to person, place, and time. He appears well-developed and well-nourished. No distress.   HENT:   Head: Normocephalic and atraumatic.   Right Ear: External ear normal.   Left Ear: External ear normal.   Nose: Nose normal.   Mouth/Throat: Oropharynx is clear and moist. Abnormal dentition.   Eyes: EOM are normal. Pupils are equal, round, and reactive to light. Right conjunctiva is injected. Left conjunctiva is injected.   Neck: Normal range of motion. Neck supple.   Cardiovascular: Normal rate, regular rhythm, normal heart sounds and intact distal pulses.   No murmur heard.  Pulmonary/Chest: Effort normal and breath sounds normal. No respiratory distress. He has no wheezes. He has no rales.   Abdominal: Soft. Bowel sounds are normal. He exhibits no distension and no mass. There is no tenderness. There is no guarding.   Musculoskeletal: Normal range of  motion. He exhibits no edema, tenderness or deformity.   Neurological: He is alert and oriented to person, place, and time. He has normal strength. He exhibits normal muscle tone. GCS eye subscore is 4. GCS verbal subscore is 5. GCS motor subscore is 6.   No pronator drift. No nystagmus. Patient dizzy upon standing, unable to assess gait. No dysarthria. No visual field deficits noted; able to accurately count fingers with both eyes. Strength normal and equal in all extremities bilaterally.   Skin: Skin is warm and dry. Capillary refill takes less than 2 seconds. He is not diaphoretic.   Psychiatric: He has a normal mood and affect. His behavior is normal. Judgment and thought content normal.   Nursing note and vitals reviewed.        CRANIAL NERVES     CN I  cranial nerve I not tested    CN II   Right visual field deficit: none  Left visual field deficit: none     CN III, IV, VI   Pupils are equal, round, and reactive to light.  Extraocular motions are normal.   Nystagmus: none   Upgaze: normal  Downgaze: normal    CN VII   Facial expression full, symmetric.     CN VIII   Hearing: intact    CN IX, X   CN IX normal.     CN XI   CN XI normal.     CN XII   CN XII normal.        Significant Labs: All pertinent labs within the past 24 hours have been reviewed.    Significant Imaging: I have reviewed all pertinent imaging results/findings within the past 24 hours.     Ct Head Without Contrast    Result Date: 11/22/2018  EXAMINATION: CT HEAD WITHOUT CONTRAST CLINICAL HISTORY: Headache, acute, norm neuro exam; TECHNIQUE: Multiple sequential 5 mm axial images of the head without contrast.  Coronal and sagittal reformatted imaging from the axial acquisition. COMPARISON: 10/04/2014 FINDINGS: There is no evidence for acute intracranial hemorrhage or sulcal effacement.  The ventricles are normal in size without hydrocephalus.  There is no midline shift or mass effect.  Visualized paranasal sinuses and mastoid air cells are  clear.     Unremarkable noncontrast CT head specifically without evidence for acute intracranial hemorrhage.  Clinical correlation and further evaluation as warranted.     Electronically signed by:Samantha Vo MD Date:11/22/2018 Time:14:31    X-ray Chest Ap Portable    Result Date: 11/22/2018  EXAMINATION: XR CHEST AP PORTABLE CLINICAL HISTORY: Stroke; TECHNIQUE: Single frontal view of the chest was performed. COMPARISON: 09/02/2018 FINDINGS: chronic lung changes are seen bilaterally.  No consolidation or pleural effusions.  The heart is normal in size.  The skeletal structures are intact.     As above.    Electronically signed by:Samantha Vo MD Date:11/22/2018 Time:16:08

## 2018-11-23 NOTE — ASSESSMENT & PLAN NOTE
Patient takes pravastatin 40 mg PO daily at home  -Lipid panel within normal limits  -Continue atorvastatin 40 mg PO daily

## 2018-11-23 NOTE — ASSESSMENT & PLAN NOTE
Nausea and vomiting  59 y/o male with acute onset of dizziness, generalized weakness, diaphoresis, nausea, vomiting and unsteady gait last night around 2 am. Afebrile. BP >200s in the ED. Labs unremarkable. CXR negative. CT head negative for acute intracranial abnormality. Had telestroke eval in the ED.    - Check TSH, lipid profile, HbA1c    - ASA, statin  - MRI/MRA, Echo  - BP control  - Clear liquid diet and advance as tolerated  - Antiemetics prn  - Monitor on telemetry  - PT/OT/ST  - See Stroke Telemedicine note

## 2018-11-23 NOTE — ASSESSMENT & PLAN NOTE
Tobacco abuse  Current every day smoker. No acute respiratory complaints or hypoxia. Lungs CTA and chest XR clear.  -Encourage smoking cessation

## 2018-11-23 NOTE — ASSESSMENT & PLAN NOTE
Patient takes pravastatin 40 mg PO daily at home  -Check lipid panel  -Give atorvastatin 40 mg PO daily

## 2018-11-23 NOTE — NURSING
Patient discharged to home. Discharge instructions given to the patient, patient verbalized complete understanding. Education given, refer to clinical reference for education. IV removed times two, tip intact. Telemetry monitor removed. Waiting on transportation.

## 2018-11-23 NOTE — ASSESSMENT & PLAN NOTE
Essential hypertension  BP elevated >200 in the ED. Patient does not take home meds.  -Continue home hydrochlorothiazide, irbesartan, metoprolol, and nifedipine  -PRN labetalol for SBP >220

## 2018-11-23 NOTE — CONSULTS
RE: Assess Dietary Needs    Patient reports past diet education on low sodium diet. He reports cooks most meals but will eat at [employer] occasionally. Reinforced compliance to low sodium diet to prevent hospitalization for fluid overload. Pt verbalizes understanding. Disposition pending for today. No further nutrition interventions warranted at this time.

## 2018-11-23 NOTE — PT/OT/SLP EVAL
Speech Language Pathology Evaluation  Bedside Swallow and Informal Speech Screen    Patient Name:  Andrew Velazquez   MRN:  [id number]  Admitting Diagnosis: Dizziness    Recommendations:                 General Recommendations:  Follow-up not indicated  Diet recommendations:  Regular, Thin   Aspiration Precautions: Standard aspiration precautions   General Precautions: Standard, fall  Communication strategies:  none    History:     Past Medical History:   Diagnosis Date    Arthritis     Back pain     Chronic kidney disease     stage 3    COPD (chronic obstructive pulmonary disease)     Esophageal reflux     HIV (human immunodeficiency virus infection)     2006    HIV infection     Hypertension     Lumbago     Psoriasis     Skin disorder     Tobacco use        Past Surgical History:   Procedure Laterality Date    COLONOSCOPY N/A 4/20/2015    Performed by Kelly Villalobos [facility] at [facility]    FINGER SURGERY      TONSILLECTOMY       HPI: Mr. Andrew Velazquez is a 59 yo black male with HTN, HLD, AIDS with last CD4 149 on bactrim prophylaxis, GERD, JANA, and COPD who presents today with dizziness for stroke rule out. He reports that he has experienced progressively persistent imbalance and ataxia accompanied by generalized weakness, fatigue, and several episodes of nausea and vomiting that began last night around 2am. He denies fever, chills, headache, chest pain, shortness of breath, abdominal pain, diarrhea, changes in speech, changes in vision, numbness or tingling, or any other acute symptoms. He denies any recent trauma. Patient does report that he has been taking care of a friend this week who has had nausea, vomiting and diarrhea. He does not take his medications regularly. On presentation to the ED his BP was 222/103 and remained elevated despite two doses of hydralazine. Code stroke called d/t pt's imbalance. Vascular neurologist Dr. Byers felt sx were not focal and could represent posterior circulation  "event vs hypertensive encephalopathy, PRES, or opportunistic infection. Labs were unremarkable. CT head and CXR showed nothing acute. He was given prochlorperazine and a 500 mL IV fluid bolus with improvement in nausea and vomiting. He was admitted to [facility] for further evaluation in the CDU.      Social History: Patient lives at home.    Prior Intubation HX:  N/A    Modified Barium Swallow: None on file    Chest X-Rays: chronic lung changes are seen bilaterally.  No consolidation or pleural effusions.  The heart is normal in size.  The skeletal structures are intact.    MRI Brain without Contrast:   Impression:       No acute intracranial abnormality.    Remote lacunar type infarct in the left christy.    No significant large vessel stenosis, aneurysm, or vascular malformation identified in the head or neck.       Prior diet: Per pt, regular/thin liquids PO diet      Subjective   Pt participated in clinical swallow eval and informal speech screen. SLP confirmed with LPN prior to entry. Pt found in bed awake, alert and with friends present at bedside. Pt agreeable to participate in skilled ST session.    Patient goals: "I'm feeling a whole lot better today" per pt     Pain/Comfort:  · Pain Rating 1: 0/10    Objective:   Pt participated in clinical swallow eval this PM. Pt tolerated thin liquids, puree, and hard solid textures with no overt s/s of aspiration, at bedside. Pt also participated in informal speech screen to determine if further evaluation required to r/o cognitive-linguistic skills-- pt demonstrated baseline cognitive-linguistic skills.   Oral Musculature Evaluation  · Oral Musculature: WFL  · Dentition: scattered dentition  · Mucosal Quality: good  · Mandibular Strength and Mobility: WFL  · Oral Labial Strength and Mobility: WFL  · Lingual Strength and Mobility: WFL  · Buccal Strength and Mobility: WFL  · Volitional Swallow: (timely upon palpation)  · Voice Prior to PO Intake: " (clear)    Bedside Swallow Eval:   Consistencies Assessed:  · Thin liquids -via cup sips x6, straw x4  · Puree -(pudding) x4  · Solids -(anna marie cracker) x3     Oral Phase:   · WFL    Pharyngeal Phase:   · no overt clinical signs/symptoms of aspiration  · no overt clinical signs/symptoms of pharyngeal dysphagia    Compensatory Strategies  · None    Speech-Language Screening:  -Pt oriented to name, , situation and time  -Pt demonstrated good STM skills, as pt able to independently recall 3/3 unrelated words given by SLP with 1 and 3 min delay during delayed memory recall tasks  -Pt demonstrated receptive and expressive language skills to be judged WFL-- pt able to follow 2-3 step commands, identify items within room, and demonstrated appropriate sentence formulation during conversations.  -Pt also able to name 10/10 items within in room and given body parts, as well as name 37 items within given category (animals) during divergent naming task with 1 min time constraint. Norm is 15-20 items for 1 min.  -Pt able to recall PmHx and state reasoning for current admission here at [facility]  -Pt also participated in diadochokinetics task (/pa/, /ta/, /ka/) and demonstrated motor speech skills to be judged WFL   -Overall, pt demonstrates baseline speech-language and cognitive skills.      Treatment: No further skilled ST services required, as pt has met all short term SLP goals.  SLP educated pt on role of SLP, clinical swallow eval, diet recs/swallow precautions, speech screen/results and POC. Pt acknowledged and confirmed understanding.       Assessment:     Andrew Velazquez is a 58 y.o. male admitted with Dizziness.  He presents with oral and pharyngeal phases of the swallow judged to be WFL. Pt also demonstrates baseline cognitive-linguistic skills.   SLP recs: Regular/thin liquids po diet with universal swallow precautions. ST services no longer required as pt has met all ST goals. SLP notified CECI King and PA  Meléndez of results/recs. Please re-consult should pt experience any change in status.         Goals:   Multidisciplinary Problems     SLP Goals     Not on file          Multidisciplinary Problems (Resolved)        Problem: SLP Goal    Goal Priority Disciplines Outcome   SLP Goal   (Resolved)     SLP Outcome(s) achieved   Description:  Short Term Goals:  1. Pt will participate in BSS to determine least restrictive diet.- MET 11/23  2. Pt will participate in informal speech screen to determine if further evaluation is required to r/o cognitive-linguistic deficits.- MET 11/23                      Plan:     · Plan of Care reviewed with:  patient, friend(CECI King and YG Meléndez)   · SLP Follow-Up:  No       Discharge recommendations:  (no further skilled ST services required)   Barriers to Discharge:  None    Time Tracking:     SLP Treatment Date:   11/23/18  Speech Start Time:  1258  Speech Stop Time:  1314     Speech Total Time (min):  16 min    Billable Minutes: Eval Swallow and Oral Function 16    ROMAN Taylor, CCC-SLP  11/23/2018

## 2018-11-24 NOTE — PT/OT/SLP EVAL
Physical Therapy Evaluation/Discharge    Patient Name:  Andrew Velazquez   MRN:  [id number]    Recommendations:     Discharge Recommendations:  outpatient PT   Discharge Equipment Recommendations: cane, straight   Barriers to discharge: None    Assessment:     Andrew Velazquez is a 58 y.o. male admitted with a medical diagnosis of Dizziness.  He presents with the following impairments/functional limitations:  impaired balance, impaired functional mobilty, gait instability Patient with good, equal strength BLEs; good coordination, occasional decreased balance with capacity for self recovery; pt has std cane that he can use; recommend OP PT for balance, conditioning.   .  Recent Surgery: * No surgery found *      Plan:      (d/c acute PT services)   Plan of Care Reviewed with: patient, friend    Subjective     Communicated with nurse prior to session.  Patient found supine upon PT entry to room, agreeable to evaluation.      Chief Complaint: patient concerned about his balance but feels better and wants to go home  Patient comments/goals: return to PLOF  Pain/Comfort:  · Pain Rating 1: 0/10  · Pain Rating Post-Intervention 1: 0/10    Patients cultural, spiritual, Presybeterian conflicts given the current situation: none reported    Living Environment:  Living Environment: Pt lives with nephew in [facility], no steps, tub/shower combo  Previous level of function: reports independence; disabled; drives  Equipment Owned:  cane, straight  Assistance upon Discharge: reports from nephew if required- nephew works nights     Objective:     General Precautions: Standard, fall   Orthopedic Precautions:N/A   Braces: N/A     Exams:  · Cognitive Exam:  Patient is oriented to Person, Place, Time and Situation  · Fine Motor Coordination:    · -       Intact  RLE heel shin, LLE heel shin and Rapid alternating ankle DF/PF  · Gross Motor Coordination:  WFL  · Postural Exam:  Patient presented with the following abnormalities:    · -       Rounded  shoulders  · LLE ROM: WFL  · LLE Strength: WFL  · RLE ROM: WFL  · RLE Strength: WFL    Functional Mobility:  · Bed Mobility:     · Rolling Right: modified independence  · Scooting: modified independence  · Supine to Sit: modified independence  · Sit to Supine: modified independence  · Transfers:     · Sit to Stand:  supervision and stand by assistance with no AD; able to perform 3 trials in succession without LOB  · Gait: Patient amb 200ft with SBA without AD; initial deviation in balance toward L walking within first 20ft of amb with pt able to self recover; pt amb with ER LEs, increased lateral sway with occasional deviation-extra staggered step; improvement as pt ambulated  · Balance:  Sitting ~good; static stand~good; dynamic stand/amb fair+    AM-PAC 6 CLICK MOBILITY  Total Score:22       Therapeutic Activities and Exercises:   Patient without c/o HA or dizziness upon change in position or with head turns, body turns, tracking;   - romberg; able to SLS with increasing length of time up to 10 sec over 3 trials with good compensatory movement; dynamic reaching in all quadrants without LOB; able to withstand min-mod perturbations ant -post with compensatory movement to regain balance; pt able to toe walk with CGA 2/2 increased trunk fl and head down, able to heel walk with CGA; amb forward, backward, 360 and 180 turns, head turns with amb-refer to gait as above.  Initial sitting /86 HR76 O2 sats 100%  Post amb /84 HR 74 O2 sats 100%      Patient left supine with all lines intact, call button in reach, nurse notified and friends present.    GOALS:   Multidisciplinary Problems     Physical Therapy Goals     Not on file          Multidisciplinary Problems (Resolved)        Problem: Physical Therapy Goal    Goal Priority Disciplines Outcome Goal Variances Interventions   Physical Therapy Goal   (Resolved)     PT, PT/OT Outcome(s) achieved                     History:     Past Medical History:   Diagnosis  Date    Arthritis     Back pain     Chronic kidney disease     stage 3    COPD (chronic obstructive pulmonary disease)     Esophageal reflux     HIV (human immunodeficiency virus infection)     2006    HIV infection     Hypertension     Lumbago     Psoriasis     Skin disorder     Tobacco use        Past Surgical History:   Procedure Laterality Date    COLONOSCOPY N/A 4/20/2015    Performed by Kelly Villalobos MD at [facility]O    FINGER SURGERY      TONSILLECTOMY         Clinical Decision Making:     History  Co-morbidities and personal factors that may impact the plan of care Examination  Body Structures and Functions, activity limitations and participation restrictions that may impact the plan of care Clinical Presentation   Decision Making/ Complexity Score   Co-morbidities:   [] Time since onset of injury / illness / exacerbation  [x] Status of current condition  []Patient's cognitive status and safety concerns    [x] Multiple Medical Problems (see med hx)  Personal Factors:   [] Patient's age  [] Prior Level of function   [] Patient's home situation (environment and family support)  [] Patient's level of motivation  [] Expected progression of patient      HISTORY:(criteria)    [] 28504 - no personal factors/history    [x] 92344 - has 1-2 personal factor/comorbidity     [] 08221 - has >3 personal factor/comorbidity     Body Regions:  [x] Objective examination findings  [] Head     []  Neck  [] Trunk   [] Upper Extremity  [] Lower Extremity    Body Systems:  [x] For communication ability, affect, cognition, language, and learning style: the assessment of the ability to make needs known, consciousness, orientation (person, place, and time), expected emotional /behavioral responses, and learning preferences (eg, learning barriers, education  needs)  [x] For the neuromuscular system: a general assessment of gross coordinated movement (eg, balance, gait, locomotion, transfers, and transitions) and  motor function  (motor control and motor learning)  [x] For the musculoskeletal system: the assessment of gross symmetry, gross range of motion, gross strength, height, and weight  [] For the integumentary system: the assessment of pliability(texture), presence of scar formation, skin color, and skin integrity  [] For cardiovascular/pulmonary system: the assessment of heart rate, respiratory rate, blood pressure, and edema     Activity limitations:    [] Patient's cognitive status and saf ety concerns          [] Status of current condition      [] Weight bearing restriction  [] Cardiopulmunary Restriction    Participation Restrictions:   [] Goals and goal agreement with the patient     [] Rehab potential (prognosis) and probable outcome      Examination of Body System: (criteria)    [] 00814 - addressing 1-2 elements    [x] 02520 - addressing a total of 3 or more elements     [] 99045 -  Addressing a total of 4 or more elements         Clinical Presentation: (criteria)  Stable - 91100     On examination of body system using standardized tests and measures patient presents with 3 or more elements from any of the following: body structures and functions, activity limitations, and/or participation restrictions.  Leading to a clinical presentation that is considered stable and/or uncomplicated                              Clinical Decision Making  (Eval Complexity):  Low- 31154     Time Tracking:     PT Received On: 11/23/18  PT Start Time: 1425     PT Stop Time: 1449  PT Total Time (min): 24 min     Billable Minutes: Evaluation 24 minutes      Samantha Dial, PT  11/23/2018

## 2019-01-03 PROBLEM — Z16.33 RESISTANCE TO ANTIVIRAL DRUG: Status: ACTIVE | Noted: 2019-01-03

## 2019-04-03 PROBLEM — Z86.73 HISTORY OF CVA (CEREBROVASCULAR ACCIDENT) WITHOUT RESIDUAL DEFICITS: Status: ACTIVE | Noted: 2019-04-03

## 2019-06-03 ENCOUNTER — HOSPITAL ENCOUNTER (EMERGENCY)
Facility: HOSPITAL | Age: 59
Discharge: HOME | End: 2019-06-03
Attending: EMERGENCY MEDICINE
Payer: MEDICAID

## 2019-06-03 VITALS
BODY MASS INDEX: 26.66 KG/M2 | HEIGHT: 69 IN | DIASTOLIC BLOOD PRESSURE: 72 MMHG | TEMPERATURE: 99 F | HEART RATE: 48 BPM | WEIGHT: 180 LBS | OXYGEN SATURATION: 99 % | RESPIRATION RATE: 19 BRPM | SYSTOLIC BLOOD PRESSURE: 146 MMHG

## 2019-06-03 DIAGNOSIS — R42 VERTIGO: ICD-10-CM

## 2019-06-03 LAB
ALBUMIN SERPL BCP-MCNC: 3.6 G/DL (ref 3.5–5.2)
ALP SERPL-CCNC: 134 U/L (ref 55–135)
ALT SERPL W/O P-5'-P-CCNC: 13 U/L (ref 10–44)
ANION GAP SERPL CALC-SCNC: 11 MMOL/L (ref 8–16)
AST SERPL-CCNC: 16 U/L (ref 10–40)
BASOPHILS # BLD AUTO: 0.01 K/UL (ref 0–0.2)
BASOPHILS NFR BLD: 0.2 % (ref 0–1.9)
BILIRUB SERPL-MCNC: 0.5 MG/DL (ref 0.1–1)
BILIRUB UR QL STRIP: NEGATIVE
BNP SERPL-MCNC: 296 PG/ML (ref 0–99)
BUN SERPL-MCNC: 18 MG/DL (ref 6–20)
CALCIUM SERPL-MCNC: 9.6 MG/DL (ref 8.7–10.5)
CHLORIDE SERPL-SCNC: 101 MMOL/L (ref 95–110)
CLARITY UR: CLEAR
CO2 SERPL-SCNC: 24 MMOL/L (ref 23–29)
COLOR UR: YELLOW
CREAT SERPL-MCNC: 1.4 MG/DL (ref 0.5–1.4)
DIFFERENTIAL METHOD: ABNORMAL
EOSINOPHIL # BLD AUTO: 0.1 K/UL (ref 0–0.5)
EOSINOPHIL NFR BLD: 2.3 % (ref 0–8)
ERYTHROCYTE [DISTWIDTH] IN BLOOD BY AUTOMATED COUNT: 15.2 % (ref 11.5–14.5)
EST. GFR  (AFRICAN AMERICAN): >60 ML/MIN/1.73 M^2
EST. GFR  (NON AFRICAN AMERICAN): 55 ML/MIN/1.73 M^2
GLUCOSE SERPL-MCNC: 139 MG/DL (ref 70–110)
GLUCOSE UR QL STRIP: NEGATIVE
HCT VFR BLD AUTO: 42 % (ref 40–54)
HGB BLD-MCNC: 13.9 G/DL (ref 14–18)
HGB UR QL STRIP: NEGATIVE
INR PPP: 1 (ref 0.8–1.2)
KETONES UR QL STRIP: NEGATIVE
LEUKOCYTE ESTERASE UR QL STRIP: NEGATIVE
LYMPHOCYTES # BLD AUTO: 2.6 K/UL (ref 1–4.8)
LYMPHOCYTES NFR BLD: 46 % (ref 18–48)
MCH RBC QN AUTO: 28.7 PG (ref 27–31)
MCHC RBC AUTO-ENTMCNC: 33.1 G/DL (ref 32–36)
MCV RBC AUTO: 87 FL (ref 82–98)
MONOCYTES # BLD AUTO: 0.3 K/UL (ref 0.3–1)
MONOCYTES NFR BLD: 5.1 % (ref 4–15)
NEUTROPHILS # BLD AUTO: 2.6 K/UL (ref 1.8–7.7)
NEUTROPHILS NFR BLD: 46 % (ref 38–73)
NITRITE UR QL STRIP: NEGATIVE
PH UR STRIP: 6 [PH] (ref 5–8)
PLATELET # BLD AUTO: 271 K/UL (ref 150–350)
PMV BLD AUTO: 10.3 FL (ref 9.2–12.9)
POTASSIUM SERPL-SCNC: 3.9 MMOL/L (ref 3.5–5.1)
PROT SERPL-MCNC: 7.9 G/DL (ref 6–8.4)
PROT UR QL STRIP: NEGATIVE
PROTHROMBIN TIME: 10 SEC (ref 9–12.5)
RBC # BLD AUTO: 4.85 M/UL (ref 4.6–6.2)
SODIUM SERPL-SCNC: 136 MMOL/L (ref 136–145)
SP GR UR STRIP: 1.02 (ref 1–1.03)
URN SPEC COLLECT METH UR: NORMAL
UROBILINOGEN UR STRIP-ACNC: NEGATIVE EU/DL
WBC # BLD AUTO: 5.69 K/UL (ref 3.9–12.7)

## 2019-06-03 PROCEDURE — 85025 COMPLETE CBC W/AUTO DIFF WBC: CPT

## 2019-06-03 PROCEDURE — 83880 ASSAY OF NATRIURETIC PEPTIDE: CPT

## 2019-06-03 PROCEDURE — 85610 PROTHROMBIN TIME: CPT

## 2019-06-03 PROCEDURE — 93010 ELECTROCARDIOGRAM REPORT: CPT | Mod: ,,, | Performed by: INTERNAL MEDICINE

## 2019-06-03 PROCEDURE — 80053 COMPREHEN METABOLIC PANEL: CPT

## 2019-06-03 PROCEDURE — 99285 EMERGENCY DEPT VISIT HI MDM: CPT | Mod: 25

## 2019-06-03 PROCEDURE — 81003 URINALYSIS AUTO W/O SCOPE: CPT

## 2019-06-03 PROCEDURE — 93010 EKG 12-LEAD: ICD-10-PCS | Mod: ,,, | Performed by: INTERNAL MEDICINE

## 2019-06-03 PROCEDURE — 93005 ELECTROCARDIOGRAM TRACING: CPT

## 2019-06-03 PROCEDURE — 25500020 PHARM REV CODE 255: Performed by: EMERGENCY MEDICINE

## 2019-06-03 RX ORDER — ONDANSETRON 4 MG/1
4 TABLET, ORALLY DISINTEGRATING ORAL EVERY 6 HOURS PRN
Qty: 12 TABLET | Refills: 0 | Status: ON HOLD | OUTPATIENT
Start: 2019-06-03 | End: 2020-01-12 | Stop reason: HOSPADM

## 2019-06-03 RX ADMIN — IOHEXOL 100 ML: 350 INJECTION, SOLUTION INTRAVENOUS at 03:06

## 2019-06-03 NOTE — ED NOTES
Headache, intermittent double and blurry vision, feels balance is off but denies dizziness or nausea--does have abnormal gait. S/startedf yesterday 1700--intermittent. Clear speech, PERRLA 2+, CALM/COOPERATIVE.

## 2019-06-03 NOTE — ED PROVIDER NOTES
Encounter Date: 6/3/2019    SCRIBE #1 NOTE: I, Robyn Rowe, am scribing for, and in the presence of,  Dr. Qureshi . I have scribed the entire note.       History     Chief Complaint   Patient presents with    Dizziness     pt reports onset of dizziness, unsteady gait, blurry vision that began last night at 1830. pt is van -. + nystagmus to bilateral eyes noted on exam     Time seen by provider: 2:09 PM    This is a 58 y.o. male who presents via EMS with complaint of dizziness that began yesterday evening around 6 PM. Pt reports he was walking to the mailbox and notes he was unable to walk in a straight line to get to the mailbox. He notes associated blurry vision, and neck stiffness, but denies any nausea, vomiting or LOC. Patient has a Hx of COPD, HTN, AIDS, and Osteoarthritis.     The history is provided by the patient.     Review of patient's allergies indicates:   Allergen Reactions    Penicillins Hives    Sustiva [efavirenz] Other (See Comments)     insomnia     Past Medical History:   Diagnosis Date    Arthritis     Back pain     Chronic kidney disease     stage 3    COPD (chronic obstructive pulmonary disease)     Esophageal reflux     HIV (human immunodeficiency virus infection)     2006    HIV infection     Hypertension     Lumbago     Psoriasis     Skin disorder     Tobacco use      Past Surgical History:   Procedure Laterality Date    COLONOSCOPY N/A 4/20/2015    Performed by [facility] at [facility] ENDO    FINGER SURGERY      TONSILLECTOMY       Family History   Problem Relation Age of Onset    Arthritis Mother     Hypertension Mother     Hypertension Sister     Arthritis Sister     No Known Problems Father      Social History     Tobacco Use    Smoking status: Current Every Day Smoker     Packs/day: 0.25     Years: 40.00     Pack years: 10.00     Types: Cigarettes    Smokeless tobacco: Never Used   Substance Use Topics    Alcohol use: Yes     Alcohol/week: 0.0 oz      Comment: occassional beer; pt drank beer this am    Drug use: No     Review of Systems   Constitutional: Negative for fever.   Eyes: Positive for visual disturbance.   Respiratory: Negative for cough.    Cardiovascular: Negative for chest pain and leg swelling.   Gastrointestinal: Negative for nausea and vomiting.   Musculoskeletal: Positive for neck stiffness.   Skin: Negative for rash and wound.   Neurological: Positive for dizziness. Negative for syncope.       Physical Exam     Initial Vitals [06/03/19 1401]   BP Pulse Resp Temp SpO2   122/72 (!) 57 16 98.5 °F (36.9 °C) 97 %      MAP       --         Physical Exam    Nursing note and vitals reviewed.  Constitutional: He appears well-developed and well-nourished. He is not diaphoretic. No distress.   HENT:   Head: Normocephalic and atraumatic.   Mouth/Throat: Oropharynx is clear and moist.   Eyes: EOM are normal. Pupils are equal, round, and reactive to light.   Neck: No tracheal deviation present.   Cardiovascular: Normal rate, regular rhythm, normal heart sounds and intact distal pulses.   Pulmonary/Chest: Breath sounds normal. No stridor. No respiratory distress.   Abdominal: Soft. He exhibits no distension and no mass. There is no tenderness.   Musculoskeletal: Normal range of motion. He exhibits no edema.   Neurological: He is alert and oriented to person, place, and time. No cranial nerve deficit or sensory deficit.   Skin: Skin is warm and dry. Capillary refill takes less than 2 seconds. No rash noted.   Psychiatric: He has a normal mood and affect. His behavior is normal. Thought content normal.         ED Course   Procedures  Labs Reviewed   CBC W/ AUTO DIFFERENTIAL - Abnormal; Notable for the following components:       Result Value    Hemoglobin 13.9 (*)     RDW 15.2 (*)     All other components within normal limits   COMPREHENSIVE METABOLIC PANEL - Abnormal; Notable for the following components:    Glucose 139 (*)     eGFR if non  55  (*)     All other components within normal limits   B-TYPE NATRIURETIC PEPTIDE - Abnormal; Notable for the following components:     (*)     All other components within normal limits   URINALYSIS, REFLEX TO URINE CULTURE    Narrative:     Preferred Collection Type->Urine, Clean Catch   PROTIME-INR     EKG Readings: (Independently Interpreted)   1420  NSR; rate of 56 bpm; occasional PVC's; no ST-T changes; positive LVH      ECG Results          EKG 12-lead (In process)  Result time 06/03/19 15:45:26    In process by Interface, Lab In [facility] (06/03/19 15:45:26)                 Narrative:    Test Reason : R42,    Vent. Rate : 056 BPM     Atrial Rate : 056 BPM     P-R Int : 188 ms          QRS Dur : 090 ms      QT Int : 438 ms       P-R-T Axes : 057 051 028 degrees     QTc Int : 422 ms    Sinus bradycardia with Premature supraventricular complexes  Otherwise normal ECG  When compared with ECG of 22-NOV-2018 14:20,  Premature supraventricular complexes are now Present  Non-specific change in ST segment in Inferior leads  Nonspecific T wave abnormality has replaced inverted T waves in Inferior  leads    Referred By: [email or web address]   SELF           Confirmed By:                             X-Rays:   Independently Interpreted Readings:   Other Readings:  Reviewed by myself, read by radiology.       CTA Head   Final Result      No acute abnormality. No high-grade stenosis or major vessel occlusion.      Small remote left christy infarction.         Electronically signed by: Mike Rowe MD   Date:    06/03/2019   Time:    16:12      X-Ray Chest AP Portable   Final Result      Persistent mild accentuation of the interstitial markings similar.         Electronically signed by: Tyrese Soto MD   Date:    06/03/2019   Time:    14:55          Medical Decision Making:   Clinical Tests:   Lab Tests: Ordered and Reviewed  Radiological Study: Ordered and Reviewed  Medical Tests: Ordered and Reviewed                   ED Course  as of Jun 03 1907   Mon Jun 03, 2019   1906 Patient is sleeping in the room and states the symptoms have all resolved.  Labs are normal, CT head is negative.  The patient will be discharged at this time since the symptoms have resolved.  The patient is bradycardic, his heart rate is between 44 beats per minute and 69 beats per minute. He is not symptomatic when his heart rate is less than 60.    [ST]      ED Course User Index  [ST] Dannielle Qureshi MD     Clinical Impression:       ICD-10-CM ICD-9-CM   1. Vertigo R42 780.4     I, Dannielle Qureshi, personally performed the services described in this documentation. All medical record entries made by the scribe were at my direction and in my presence.  I have reviewed the chart and agree that the record reflects my personal performance and is accurate and complete. Dannielle Qureshi M.D. 7:06 PM06/03/2019                           Dannielle Qureshi MD  06/03/19 1907

## 2019-08-08 NOTE — HPI
Pt currently resting in bed with a 1:1 @ bedside  He is complaining of indigestion and was given some ginger ale  Pt took a few sips in which he tolerated poorly  Pt vomited 500 cc, nurse and Dr Josue Partida  aware  Crys Love  08/08/19 4031 Pt reports onset last night around 2 AM, felt diffusely weak, diaphoretic, dizzy and unsteady.  Had been awake at onset, hard for him to tell me if sudden or gradual onset.  + N/V multiple times overnight.  Gait unsteady.  Has not been taking his HTN medications recently.  States he has been sitting with a friend who had the same type of symptoms a couple days ago

## 2019-10-19 ENCOUNTER — HOSPITAL ENCOUNTER (EMERGENCY)
Facility: HOSPITAL | Age: 59
Discharge: HOME | End: 2019-10-19
Attending: EMERGENCY MEDICINE
Payer: MEDICAID

## 2019-10-19 VITALS
OXYGEN SATURATION: 100 % | HEIGHT: 69 IN | TEMPERATURE: 98 F | WEIGHT: 175 LBS | HEART RATE: 65 BPM | RESPIRATION RATE: 19 BRPM | SYSTOLIC BLOOD PRESSURE: 184 MMHG | BODY MASS INDEX: 25.92 KG/M2 | DIASTOLIC BLOOD PRESSURE: 90 MMHG

## 2019-10-19 DIAGNOSIS — M79.602 LEFT ARM PAIN: Primary | ICD-10-CM

## 2019-10-19 DIAGNOSIS — M79.603 ARM PAIN: ICD-10-CM

## 2019-10-19 DIAGNOSIS — I10 HYPERTENSION, UNSPECIFIED TYPE: ICD-10-CM

## 2019-10-19 PROCEDURE — 25000003 PHARM REV CODE 250: Performed by: PHYSICIAN ASSISTANT

## 2019-10-19 PROCEDURE — 99283 EMERGENCY DEPT VISIT LOW MDM: CPT | Mod: 25

## 2019-10-19 RX ORDER — METOPROLOL SUCCINATE 25 MG/1
50 TABLET, EXTENDED RELEASE ORAL DAILY
Status: DISCONTINUED | OUTPATIENT
Start: 2019-10-19 | End: 2019-10-19 | Stop reason: HOSPADM

## 2019-10-19 RX ORDER — NIFEDIPINE 30 MG/1
90 TABLET, EXTENDED RELEASE ORAL DAILY
Status: DISCONTINUED | OUTPATIENT
Start: 2019-10-19 | End: 2019-10-19 | Stop reason: HOSPADM

## 2019-10-19 RX ORDER — HYDROCODONE BITARTRATE AND ACETAMINOPHEN 5; 325 MG/1; MG/1
1 TABLET ORAL
Status: COMPLETED | OUTPATIENT
Start: 2019-10-19 | End: 2019-10-19

## 2019-10-19 RX ADMIN — METOPROLOL SUCCINATE 50 MG: 25 TABLET, FILM COATED, EXTENDED RELEASE ORAL at 01:10

## 2019-10-19 RX ADMIN — HYDROCODONE BITARTRATE AND ACETAMINOPHEN 1 TABLET: 5; 325 TABLET ORAL at 12:10

## 2019-10-19 RX ADMIN — NIFEDIPINE 90 MG: 30 TABLET, FILM COATED, EXTENDED RELEASE ORAL at 01:10

## 2019-10-19 NOTE — ED NOTES
APPEARANCE: Alert, oriented and in no acute distress.  CARDIAC: Normal rate and rhythm, no murmur heard.   RESPIRATORY:Normal rate and effort, breath sounds clear bilaterally throughout chest. Respirations are equal and unlabored no obvious signs of distress.  MUSC: Full ROM. No bony tenderness or soft tissue tenderness. No obvious deformity.pain and swelling to left hand no deformity noted   SKIN: Skin is warm, flaky, and dry, normal skin turgor, mucous membranes moist.  NEURO: 5/5 strength major flexors/extensors bilaterally. Sensory intact to light touch bilaterally. Krysta coma scale: eyes open spontaneously-4, oriented & converses-5, obeys commands-6. No neurological abnormalities.   MENTAL STATUS: awake, alert and aware of environment.

## 2019-10-19 NOTE — ED PROVIDER NOTES
Encounter Date: 10/19/2019       History     Chief Complaint   Patient presents with    Hand Pain     Swelling to left hand since last night with no h/o injury. c/o pain with ROM to left wrist. States did not take BP meds today.      Patient is a 59-year-old male with history of AIDS, last CD4 97, hypertension, psoriasis is presenting to the ER for evaluation of left hand pain. Patient states that since last night he has had a left arm pain. He noticed some swelling this morning when he woke up.  Patient does not recall any injury or trauma.  He denies any paresthesias or focal weakness to extremities. No fevers at home.  He has not noticed any drainage or redness to the site.  Patient did not take any medication for symptoms today.  No history of gout.    The history is provided by the patient.     Review of patient's allergies indicates:   Allergen Reactions    Penicillins Hives    Sustiva [efavirenz] Other (See Comments)     insomnia     Past Medical History:   Diagnosis Date    Arthritis     Back pain     Chronic kidney disease     stage 3    COPD (chronic obstructive pulmonary disease)     Esophageal reflux     HIV (human immunodeficiency virus infection)     2006    HIV infection     Hypertension     Lumbago     Psoriasis     Skin disorder     Tobacco use      Past Surgical History:   Procedure Laterality Date    FINGER SURGERY      TONSILLECTOMY       Family History   Problem Relation Age of Onset    Arthritis Mother     Hypertension Mother     Hypertension Sister     Arthritis Sister     No Known Problems Father      Social History     Tobacco Use    Smoking status: Current Every Day Smoker     Packs/day: 0.25     Years: 40.00     Pack years: 10.00     Types: Cigarettes    Smokeless tobacco: Never Used   Substance Use Topics    Alcohol use: Yes     Alcohol/week: 0.0 standard drinks     Comment: occassional beer; pt drank beer this am    Drug use: No     Review of Systems    Constitutional: Negative for chills and fever.   HENT: Negative for congestion.    Respiratory: Negative for cough and shortness of breath.    Cardiovascular: Negative for chest pain and palpitations.   Gastrointestinal: Negative for abdominal pain, nausea and vomiting.   Musculoskeletal: Positive for arthralgias. Negative for joint swelling and myalgias.   Skin: Negative for color change, rash and wound.   Allergic/Immunologic: Positive for immunocompromised state.   Neurological: Negative for weakness and numbness.   Hematological: Does not bruise/bleed easily.   Psychiatric/Behavioral: Negative for confusion.       Physical Exam     Initial Vitals [10/19/19 1124]   BP Pulse Resp Temp SpO2   (!) 220/100 78 16 97.7 °F (36.5 °C) 100 %      MAP       --         Physical Exam    Vitals reviewed.  Constitutional: He appears well-developed and well-nourished. He is not diaphoretic. No distress.   HENT:   Head: Normocephalic and atraumatic.   Eyes: Conjunctivae are normal.   Neck: Neck supple.   Cardiovascular: Normal rate, regular rhythm, intact distal pulses and normal pulses.   Pulses:       Radial pulses are 2+ on the right side, and 2+ on the left side.   Pulmonary/Chest: Breath sounds normal.   Musculoskeletal:        Left forearm: He exhibits tenderness (Dorsal, distal aspect) and swelling. He exhibits no edema and no deformity.   Neurological: He is alert.   Skin: Skin is warm and dry. Rash (Scaly rash consistent with psoriasis, diffusely throughout body) noted.   Hypertrophic skin noted to hands and forearm bilaterally         ED Course   Procedures  Labs Reviewed - No data to display       Imaging Results          X-Ray Forearm Left (Final result)  Result time 10/19/19 12:45:46    Final result by Adrian Macdonald MD (10/19/19 12:45:46)                 Impression:      1. No acute displaced fracture or dislocation of the forearm.      Electronically signed by: Adrian Macdonald  MD  Date:    10/19/2019  Time:    12:45             Narrative:    EXAMINATION:  XR FOREARM LEFT    CLINICAL HISTORY:  Pain in arm, unspecified    TECHNIQUE:  AP and lateral views of the left forearm were performed.    COMPARISON:  None    FINDINGS:  Two views left forearm.    No acute displaced fracture or dislocation of the forearm.  No radiopaque foreign body.  The elbow appears intact.  Degenerative changes are noted about the olecranon.                               X-Ray Wrist Complete Left (Final result)  Result time 10/19/19 12:46:20    Final result by Adrian Macdonald MD (10/19/19 12:46:20)                 Impression:      1. No acute displaced fracture or dislocation of the wrist.      Electronically signed by: Adrian Macdonald MD  Date:    10/19/2019  Time:    12:46             Narrative:    EXAMINATION:  XR WRIST COMPLETE 3 VIEWS LEFT    CLINICAL HISTORY:  Pain in arm, unspecified    TECHNIQUE:  PA, lateral, and oblique views of the left wrist were performed.    COMPARISON:  None    FINDINGS:  Three views left wrist.    No acute displaced fracture or dislocation of the wrist.  Degenerative changes are noted of the 1st carpal metacarpal joint.  There is osteopenia.  No significant edema.                                       APC / Resident Notes:   Patient was seen in the ER promptly upon arrival.  He is afebrile, no acute distress. Physical examination reveals mild swelling to the dorsal aspect of the mid forearm.  Range of motion of the wrist is fully intact.  Distal pulses intact. This is less concerning for septic arthritis.  There is no evidence of abscess formation or erythema to indicate cellulitis.    X-ray of forearm and wrist obtained.    Patient noted to be hypertensive on arrival.  Pressure of 232/118.  He was given his home dose of antihypertensive medication as he did not take it today.  He has no complaints, otherwise asymptomatic. Denies any blurred vision, chest pain, shortness of breath,  headache.    X-ray of hand and forearm were unremarkable. Isolated area of swelling may be secondary to contusion versus localized reaction.  Patient does not require any antibiotics at this time.  Advised to use an ice pack to the region.  Advised to take Tylenol or Motrin.    Upon reassessment patient's blood pressure has significantly improved to 184/90.  He again is asymptomatic.  Patient is advised to continue his home medication as prescribed.  He was given strict return precautions ED.  He is stable for discharge at this time.                 Clinical Impression:       ICD-10-CM ICD-9-CM   1. Left arm pain M79.602 729.5   2. Arm pain M79.603 729.5   3. Hypertension, unspecified type I10 401.9         Disposition:   Disposition: Discharged  Condition: Stable                        Annalisa Rivas PA-C  10/19/19 8190

## 2019-10-19 NOTE — DISCHARGE INSTRUCTIONS
Please follow-up with your family doctor.  Please stay compliant with your blood pressure medications.

## 2019-12-14 ENCOUNTER — HOSPITAL ENCOUNTER (EMERGENCY)
Facility: HOSPITAL | Age: 59
Discharge: HOME | End: 2019-12-14
Attending: EMERGENCY MEDICINE
Payer: MEDICAID

## 2019-12-14 VITALS
RESPIRATION RATE: 20 BRPM | HEART RATE: 79 BPM | DIASTOLIC BLOOD PRESSURE: 90 MMHG | SYSTOLIC BLOOD PRESSURE: 191 MMHG | WEIGHT: 140.75 LBS | BODY MASS INDEX: 20.79 KG/M2 | TEMPERATURE: 97 F | OXYGEN SATURATION: 98 %

## 2019-12-14 DIAGNOSIS — G89.29 CHRONIC PAIN OF LEFT KNEE: Primary | ICD-10-CM

## 2019-12-14 DIAGNOSIS — M25.562 CHRONIC PAIN OF LEFT KNEE: Primary | ICD-10-CM

## 2019-12-14 DIAGNOSIS — R52 PAIN: ICD-10-CM

## 2019-12-14 PROCEDURE — 96372 THER/PROPH/DIAG INJ SC/IM: CPT

## 2019-12-14 PROCEDURE — 99284 EMERGENCY DEPT VISIT MOD MDM: CPT | Mod: 25

## 2019-12-14 PROCEDURE — 63600175 PHARM REV CODE 636 W HCPCS: Performed by: NURSE PRACTITIONER

## 2019-12-14 RX ORDER — KETOROLAC TROMETHAMINE 30 MG/ML
30 INJECTION, SOLUTION INTRAMUSCULAR; INTRAVENOUS
Status: COMPLETED | OUTPATIENT
Start: 2019-12-14 | End: 2019-12-14

## 2019-12-14 RX ORDER — NAPROXEN 500 MG/1
500 TABLET ORAL 2 TIMES DAILY WITH MEALS
Qty: 60 TABLET | Refills: 0 | Status: SHIPPED | OUTPATIENT
Start: 2019-12-14 | End: 2020-02-13

## 2019-12-14 RX ORDER — KETOROLAC TROMETHAMINE 30 MG/ML
15 INJECTION, SOLUTION INTRAMUSCULAR; INTRAVENOUS
Status: DISCONTINUED | OUTPATIENT
Start: 2019-12-14 | End: 2019-12-14

## 2019-12-14 RX ADMIN — KETOROLAC TROMETHAMINE 30 MG: 30 INJECTION, SOLUTION INTRAMUSCULAR; INTRAVENOUS at 01:12

## 2019-12-14 NOTE — ED PROVIDER NOTES
Encounter Date: 12/14/2019       History     Chief Complaint   Patient presents with    Knee Pain     left     Mr. Velazquez is a 59 year old male with chronic left knee pain. His new onset of worsening of pain and swelling with ambulation without concerns for trauma. He states he has been seen at multiple emergency departments for the same.         Review of patient's allergies indicates:   Allergen Reactions    Penicillins Hives    Sustiva [efavirenz] Other (See Comments)     insomnia     Past Medical History:   Diagnosis Date    Arthritis     Back pain     Chronic kidney disease     stage 3    COPD (chronic obstructive pulmonary disease)     Esophageal reflux     HIV (human immunodeficiency virus infection)     2006    HIV infection     Hypertension     Lumbago     Psoriasis     Skin disorder     Tobacco use      Past Surgical History:   Procedure Laterality Date    FINGER SURGERY      TONSILLECTOMY       Family History   Problem Relation Age of Onset    Arthritis Mother     Hypertension Mother     Hypertension Sister     Arthritis Sister     No Known Problems Father      Social History     Tobacco Use    Smoking status: Current Every Day Smoker     Packs/day: 0.25     Years: 40.00     Pack years: 10.00     Types: Cigarettes    Smokeless tobacco: Never Used   Substance Use Topics    Alcohol use: Yes     Alcohol/week: 0.0 standard drinks     Comment: occassional beer; pt drank beer this am    Drug use: No     Review of Systems   Musculoskeletal: Positive for arthralgias.        Left knee pain   All other systems reviewed and are negative.      Physical Exam     Initial Vitals [12/14/19 1256]   BP Pulse Resp Temp SpO2   (!) 191/90 79 20 96.9 °F (36.1 °C) 98 %      MAP       --         Wt Readings from Last 3 Encounters:   12/14/19 63.8 kg (140 lb 12.2 oz)   12/13/19 63.9 kg (140 lb 12.2 oz)   10/19/19 79.4 kg (175 lb)     Temp Readings from Last 3 Encounters:   12/14/19 96.9 °F (36.1 °C)  (Oral)   12/13/19 96.7 °F (35.9 °C) (Oral)   10/19/19 97.7 °F (36.5 °C) (Oral)     Pulse Readings from Last 3 Encounters:   12/14/19 79   12/13/19 64   10/19/19 65     BP Readings from Last 3 Encounters:   12/14/19 (!) 191/90   12/13/19 (!) 176/96   10/19/19 (!) 184/90     Physical Exam    Nursing note and vitals reviewed.  Constitutional: He appears well-developed and well-nourished. He is not diaphoretic. No distress.   HENT:   Head: Normocephalic and atraumatic.   Mouth/Throat: Oropharynx is clear and moist.   Eyes: Conjunctivae and EOM are normal. Pupils are equal, round, and reactive to light.   Neck: Normal range of motion. Neck supple.   Cardiovascular: Normal rate, regular rhythm and normal heart sounds. Exam reveals no gallop and no friction rub.    No murmur heard.  +one edema lower extremities   Pulmonary/Chest: Breath sounds normal. No respiratory distress. He has no wheezes. He has no rhonchi. He has no rales.   Abdominal: Soft. Bowel sounds are normal. He exhibits no distension. There is no tenderness. There is no rebound and no guarding.   Musculoskeletal: He exhibits no edema.        Left knee: He exhibits decreased range of motion and swelling. Tenderness found.        Legs:  Neurological: He is alert and oriented to person, place, and time.   Skin: Skin is warm and dry. No erythema. No pallor.   Psychiatric: He has a normal mood and affect. His behavior is normal. Thought content normal.       Pain  -     Cancel: X-Ray Knee 3 View Left; Standing  -     X-Ray Knee 1 or 2 View Left; Standing    Other orders  -     ketorolac injection 15 mg        ED Course   Procedures  Labs Reviewed - No data to display       Imaging Results    None                                        Patient is to follow up with their designated physician and acknowledges that close follow up by a medical provider or specialist is required. I advised the patient of no life threatening condition today and we believe the they are  clinically stable for discharge.  Instructed to return to the ER if any symptoms reoccur or worsen and comply with all instruction and directions.          Clinical Impression:   1. Pain          This note is dictated on M*Modal word recognition program.  There are word recognition mistakes that are occasionally missed on review.  Clinical Impression:       ICD-10-CM ICD-9-CM   1. Chronic pain of left knee M25.562 719.46    G89.29 338.29   2. Pain R52 780.96                             Dylan Brizuela NP  12/14/19 9726

## 2019-12-14 NOTE — ED TRIAGE NOTES
59 y.o. male presents to ER ED 02/ED 02A   Chief Complaint   Patient presents with    Knee Pain     left   Pt reports left knee pain onset two days ago, reports history of arthritis. No acute distress noted.

## 2019-12-16 ENCOUNTER — HOSPITAL ENCOUNTER (EMERGENCY)
Facility: HOSPITAL | Age: 59
Discharge: HOME | End: 2019-12-16
Attending: SURGERY
Payer: MEDICAID

## 2019-12-16 VITALS
HEART RATE: 80 BPM | WEIGHT: 140 LBS | OXYGEN SATURATION: 99 % | BODY MASS INDEX: 20.67 KG/M2 | RESPIRATION RATE: 18 BRPM | SYSTOLIC BLOOD PRESSURE: 180 MMHG | DIASTOLIC BLOOD PRESSURE: 80 MMHG | TEMPERATURE: 98 F

## 2019-12-16 DIAGNOSIS — G89.29 CHRONIC PAIN OF LEFT KNEE: Primary | ICD-10-CM

## 2019-12-16 DIAGNOSIS — M25.562 CHRONIC PAIN OF LEFT KNEE: Primary | ICD-10-CM

## 2019-12-16 PROCEDURE — 63600175 PHARM REV CODE 636 W HCPCS: Performed by: SURGERY

## 2019-12-16 PROCEDURE — 96372 THER/PROPH/DIAG INJ SC/IM: CPT

## 2019-12-16 PROCEDURE — 99284 EMERGENCY DEPT VISIT MOD MDM: CPT | Mod: 25

## 2019-12-16 RX ORDER — KETOROLAC TROMETHAMINE 30 MG/ML
60 INJECTION, SOLUTION INTRAMUSCULAR; INTRAVENOUS
Status: COMPLETED | OUTPATIENT
Start: 2019-12-16 | End: 2019-12-16

## 2019-12-16 RX ADMIN — KETOROLAC TROMETHAMINE 60 MG: 30 INJECTION, SOLUTION INTRAMUSCULAR at 11:12

## 2019-12-16 NOTE — ED PROVIDER NOTES
[facility]                                                 Chief Complaint  59 y.o. male with Knee Pain    History of Present Illness  Andrew Velazquez presents to the emergency room with chronic left knee pain  Patient with chronic left knee pain, was seen in the ER 2 days ago for pain  Patient lost his prescription but it was actually at the ER  this week  Patient was given this prescription today, now requesting a shot of Toradol  Patient states he has chronic arthritis, denies any acute trauma or fall today    The history is provided by the patient   device was not used during this ER visit    Past Medical History   -- Arthritis    -- Back pain    -- Chronic kidney disease    -- COPD (chronic obstructive pulmonary disease)    -- Esophageal reflux    -- HIV (human immunodeficiency virus infection)    -- HIV infection    -- Hypertension    -- Lumbago    -- Psoriasis    -- Skin disorder    -- Tobacco use      Surgeries: Finger surgery and tonsillectomy  Allergies:  Penicillin and Sustiva    I have reviewed all of this patient's past medical, surgical, family, and social   histories as well as active allergies and medications documented in the  electronic medical record    Review of Systems and Physical Exam      Review of Systems  -- Constitution - no fever, denies fatigue, no weakness, no chills  -- Eyes - no tearing or redness, no visual disturbance  -- Ear, Nose - no tinnitus or earache, no nasal congestion or discharge  -- Mouth,Throat - no sore throat, no toothache, normal voice, normal swallowing  -- Respiratory - denies cough and congestion, no shortness of breath, no PAYNE  -- Cardiovascular - denies chest pain, no palpitations, denies claudication  -- Gastrointestinal - denies abdominal pain, nausea, vomiting, or diarrhea  -- Genitourinary - no dysuria, denies flank pain, no hematuria, no STD risk  -- Musculoskeletal - chronic left knee pain  -- Neurological  - no headache, denies weakness or seizure; no LOC  -- Skin - denies pallor, rash, or changes in skin. no hives or welts noted  -- Psychiatric - Denies SI or HI, no psychosis or fractured thought noted     Vital Signs  His blood pressure is 186/89 and his pulse is 81.   His respiration is 20 and oxygen saturation is 99%.     Physical Exam  -- Nursing note and vitals reviewed  -- Constitutional: Appears well-developed and well-nourished  -- Head: Atraumatic. Normocephalic. No obvious abnormality  -- Eyes: Pupils are equal and reactive to light. Normal conjunctiva and lids  -- Cardiac: Normal rate, regular rhythm and normal heart sounds  -- Pulmonary: Normal respiratory effort, breath sounds clear to auscultation  -- Abdominal: Soft, no tenderness. Normal bowel sounds. Normal liver edge  -- Musculoskeletal: Normal range of motion, no effusions. Joints stable   -- Neurological: No focal deficits. Showed good interaction with staff  -- Vascular: Posterior tibial, dorsalis pedis and radial pulses 2+ bilaterally      Emergency Room Course      Treatment and Evaluation  -- IM 60 mg Toradol given today in the ER    Diagnosis  -- The encounter diagnosis was Chronic pain of left knee.    Disposition and Plan  -- Disposition: home  -- Condition: stable  -- Follow-up: Patient to follow up with Marisol Tejada MD in 1-2 days.  -- I advised the patient that we have found no life threatening condition today  -- At this time, I believe the patient is clinically stable for discharge.   -- The patient acknowledges that close follow up with a MD is required   -- Patient agrees to comply with all instruction and direction given in the ER    This note is dictated on M*Modal word recognition program.  There are word recognition mistakes that are occasionally missed on review.         Sid Springer MD  12/16/19 7602

## 2019-12-16 NOTE — ED TRIAGE NOTES
59 y.o. male presents to ER ED 04/ED 04   Chief Complaint   Patient presents with    Knee Pain   Atraumatic left knee pain. No acute distress noted.

## 2020-01-11 ENCOUNTER — HOSPITAL ENCOUNTER (OUTPATIENT)
Facility: HOSPITAL | Age: 60
Discharge: HOME | End: 2020-01-12
Attending: EMERGENCY MEDICINE | Admitting: HOSPITALIST
Payer: MEDICAID

## 2020-01-11 DIAGNOSIS — M79.604 RIGHT LEG PAIN: ICD-10-CM

## 2020-01-11 DIAGNOSIS — J41.0 SIMPLE CHRONIC BRONCHITIS: Chronic | ICD-10-CM

## 2020-01-11 DIAGNOSIS — L03.115 CELLULITIS OF RIGHT LOWER EXTREMITY: ICD-10-CM

## 2020-01-11 DIAGNOSIS — R07.9 CHEST PAIN: ICD-10-CM

## 2020-01-11 PROCEDURE — 99285 EMERGENCY DEPT VISIT HI MDM: CPT | Mod: 25

## 2020-01-12 VITALS
HEART RATE: 72 BPM | RESPIRATION RATE: 18 BRPM | HEIGHT: 70 IN | OXYGEN SATURATION: 99 % | BODY MASS INDEX: 19.73 KG/M2 | DIASTOLIC BLOOD PRESSURE: 64 MMHG | TEMPERATURE: 98 F | SYSTOLIC BLOOD PRESSURE: 117 MMHG | WEIGHT: 137.81 LBS

## 2020-01-12 PROBLEM — L03.115 CELLULITIS OF RIGHT LOWER EXTREMITY: Status: ACTIVE | Noted: 2020-01-12

## 2020-01-12 PROBLEM — L60.2 ONYCHAUXIS: Status: ACTIVE | Noted: 2020-01-12

## 2020-01-12 PROBLEM — Z86.73 HISTORY OF CVA (CEREBROVASCULAR ACCIDENT) WITHOUT RESIDUAL DEFICITS: Chronic | Status: ACTIVE | Noted: 2019-04-03

## 2020-01-12 LAB
ALBUMIN SERPL BCP-MCNC: 2.9 G/DL (ref 3.5–5.2)
ALP SERPL-CCNC: 90 U/L (ref 55–135)
ALT SERPL W/O P-5'-P-CCNC: 9 U/L (ref 10–44)
ANION GAP SERPL CALC-SCNC: 12 MMOL/L (ref 8–16)
ANION GAP SERPL CALC-SCNC: 13 MMOL/L (ref 8–16)
AST SERPL-CCNC: 14 U/L (ref 10–40)
BASOPHILS # BLD AUTO: 0.01 K/UL (ref 0–0.2)
BASOPHILS # BLD AUTO: 0.03 K/UL (ref 0–0.2)
BASOPHILS NFR BLD: 0.1 % (ref 0–1.9)
BASOPHILS NFR BLD: 0.2 % (ref 0–1.9)
BILIRUB SERPL-MCNC: 0.9 MG/DL (ref 0.1–1)
BUN SERPL-MCNC: 33 MG/DL (ref 6–20)
BUN SERPL-MCNC: 34 MG/DL (ref 6–20)
CALCIUM SERPL-MCNC: 8.9 MG/DL (ref 8.7–10.5)
CALCIUM SERPL-MCNC: 9.1 MG/DL (ref 8.7–10.5)
CHLORIDE SERPL-SCNC: 95 MMOL/L (ref 95–110)
CHLORIDE SERPL-SCNC: 96 MMOL/L (ref 95–110)
CO2 SERPL-SCNC: 23 MMOL/L (ref 23–29)
CO2 SERPL-SCNC: 24 MMOL/L (ref 23–29)
CREAT SERPL-MCNC: 1.7 MG/DL (ref 0.5–1.4)
CREAT SERPL-MCNC: 1.8 MG/DL (ref 0.5–1.4)
CRP SERPL-MCNC: 250.9 MG/L (ref 0–8.2)
DIFFERENTIAL METHOD: ABNORMAL
DIFFERENTIAL METHOD: ABNORMAL
EOSINOPHIL # BLD AUTO: 0 K/UL (ref 0–0.5)
EOSINOPHIL # BLD AUTO: 0.1 K/UL (ref 0–0.5)
EOSINOPHIL NFR BLD: 0.2 % (ref 0–8)
EOSINOPHIL NFR BLD: 0.4 % (ref 0–8)
ERYTHROCYTE [DISTWIDTH] IN BLOOD BY AUTOMATED COUNT: 17.4 % (ref 11.5–14.5)
ERYTHROCYTE [DISTWIDTH] IN BLOOD BY AUTOMATED COUNT: 17.4 % (ref 11.5–14.5)
ERYTHROCYTE [SEDIMENTATION RATE] IN BLOOD BY WESTERGREN METHOD: >120 MM/HR (ref 0–10)
EST. GFR  (AFRICAN AMERICAN): 47 ML/MIN/1.73 M^2
EST. GFR  (AFRICAN AMERICAN): 50 ML/MIN/1.73 M^2
EST. GFR  (NON AFRICAN AMERICAN): 40 ML/MIN/1.73 M^2
EST. GFR  (NON AFRICAN AMERICAN): 43 ML/MIN/1.73 M^2
GLUCOSE SERPL-MCNC: 105 MG/DL (ref 70–110)
GLUCOSE SERPL-MCNC: 112 MG/DL (ref 70–110)
HCT VFR BLD AUTO: 32 % (ref 40–54)
HCT VFR BLD AUTO: 32.3 % (ref 40–54)
HGB BLD-MCNC: 10.5 G/DL (ref 14–18)
HGB BLD-MCNC: 10.6 G/DL (ref 14–18)
LYMPHOCYTES # BLD AUTO: 3.8 K/UL (ref 1–4.8)
LYMPHOCYTES # BLD AUTO: 4.1 K/UL (ref 1–4.8)
LYMPHOCYTES NFR BLD: 25.4 % (ref 18–48)
LYMPHOCYTES NFR BLD: 28.6 % (ref 18–48)
MCH RBC QN AUTO: 28.3 PG (ref 27–31)
MCH RBC QN AUTO: 28.6 PG (ref 27–31)
MCHC RBC AUTO-ENTMCNC: 32.5 G/DL (ref 32–36)
MCHC RBC AUTO-ENTMCNC: 33.1 G/DL (ref 32–36)
MCV RBC AUTO: 86 FL (ref 82–98)
MCV RBC AUTO: 87 FL (ref 82–98)
MONOCYTES # BLD AUTO: 0.6 K/UL (ref 0.3–1)
MONOCYTES # BLD AUTO: 0.7 K/UL (ref 0.3–1)
MONOCYTES NFR BLD: 4.1 % (ref 4–15)
MONOCYTES NFR BLD: 4.9 % (ref 4–15)
NEUTROPHILS # BLD AUTO: 10.3 K/UL (ref 1.8–7.7)
NEUTROPHILS # BLD AUTO: 9.2 K/UL (ref 1.8–7.7)
NEUTROPHILS NFR BLD: 65.9 % (ref 38–73)
NEUTROPHILS NFR BLD: 70.2 % (ref 38–73)
PLATELET # BLD AUTO: 231 K/UL (ref 150–350)
PLATELET # BLD AUTO: 237 K/UL (ref 150–350)
PMV BLD AUTO: 10 FL (ref 9.2–12.9)
PMV BLD AUTO: 9.7 FL (ref 9.2–12.9)
POTASSIUM SERPL-SCNC: 3.8 MMOL/L (ref 3.5–5.1)
POTASSIUM SERPL-SCNC: 4 MMOL/L (ref 3.5–5.1)
PROT SERPL-MCNC: 8.2 G/DL (ref 6–8.4)
RBC # BLD AUTO: 3.71 M/UL (ref 4.6–6.2)
RBC # BLD AUTO: 3.71 M/UL (ref 4.6–6.2)
SODIUM SERPL-SCNC: 131 MMOL/L (ref 136–145)
SODIUM SERPL-SCNC: 132 MMOL/L (ref 136–145)
WBC # BLD AUTO: 14.19 K/UL (ref 3.9–12.7)
WBC # BLD AUTO: 14.89 K/UL (ref 3.9–12.7)

## 2020-01-12 PROCEDURE — 25000003 PHARM REV CODE 250: Performed by: EMERGENCY MEDICINE

## 2020-01-12 PROCEDURE — 96365 THER/PROPH/DIAG IV INF INIT: CPT

## 2020-01-12 PROCEDURE — 36415 COLL VENOUS BLD VENIPUNCTURE: CPT

## 2020-01-12 PROCEDURE — G0378 HOSPITAL OBSERVATION PER HR: HCPCS

## 2020-01-12 PROCEDURE — S0077 INJECTION, CLINDAMYCIN PHOSP: HCPCS | Performed by: NURSE PRACTITIONER

## 2020-01-12 PROCEDURE — 96376 TX/PRO/DX INJ SAME DRUG ADON: CPT

## 2020-01-12 PROCEDURE — 25000003 PHARM REV CODE 250: Performed by: NURSE PRACTITIONER

## 2020-01-12 PROCEDURE — 80053 COMPREHEN METABOLIC PANEL: CPT

## 2020-01-12 PROCEDURE — 80048 BASIC METABOLIC PNL TOTAL CA: CPT

## 2020-01-12 PROCEDURE — S0077 INJECTION, CLINDAMYCIN PHOSP: HCPCS | Performed by: EMERGENCY MEDICINE

## 2020-01-12 PROCEDURE — 86140 C-REACTIVE PROTEIN: CPT

## 2020-01-12 PROCEDURE — 85652 RBC SED RATE AUTOMATED: CPT

## 2020-01-12 PROCEDURE — 85025 COMPLETE CBC W/AUTO DIFF WBC: CPT | Mod: 91

## 2020-01-12 RX ORDER — TRAMADOL HYDROCHLORIDE 50 MG/1
50 TABLET ORAL EVERY 6 HOURS PRN
Status: DISCONTINUED | OUTPATIENT
Start: 2020-01-12 | End: 2020-01-12 | Stop reason: HOSPADM

## 2020-01-12 RX ORDER — CLINDAMYCIN HYDROCHLORIDE 300 MG/1
300 CAPSULE ORAL 4 TIMES DAILY
Qty: 24 CAPSULE | Refills: 0 | Status: SHIPPED | OUTPATIENT
Start: 2020-01-12 | End: 2020-01-18

## 2020-01-12 RX ORDER — NIFEDIPINE 30 MG/1
90 TABLET, EXTENDED RELEASE ORAL DAILY
Status: DISCONTINUED | OUTPATIENT
Start: 2020-01-12 | End: 2020-01-12 | Stop reason: HOSPADM

## 2020-01-12 RX ORDER — PRAVASTATIN SODIUM 40 MG/1
40 TABLET ORAL NIGHTLY
Status: DISCONTINUED | OUTPATIENT
Start: 2020-01-12 | End: 2020-01-12 | Stop reason: HOSPADM

## 2020-01-12 RX ORDER — CLINDAMYCIN PHOSPHATE 600 MG/50ML
600 INJECTION, SOLUTION INTRAVENOUS
Status: DISCONTINUED | OUTPATIENT
Start: 2020-01-12 | End: 2020-01-12 | Stop reason: HOSPADM

## 2020-01-12 RX ORDER — CLINDAMYCIN PHOSPHATE 600 MG/50ML
600 INJECTION, SOLUTION INTRAVENOUS
Status: COMPLETED | OUTPATIENT
Start: 2020-01-12 | End: 2020-01-12

## 2020-01-12 RX ORDER — ASPIRIN 81 MG/1
81 TABLET ORAL DAILY
Status: DISCONTINUED | OUTPATIENT
Start: 2020-01-12 | End: 2020-01-12 | Stop reason: HOSPADM

## 2020-01-12 RX ORDER — HYDROCODONE BITARTRATE AND ACETAMINOPHEN 10; 325 MG/1; MG/1
1 TABLET ORAL
Status: COMPLETED | OUTPATIENT
Start: 2020-01-12 | End: 2020-01-12

## 2020-01-12 RX ORDER — HYDRALAZINE HYDROCHLORIDE 20 MG/ML
10 INJECTION INTRAMUSCULAR; INTRAVENOUS EVERY 8 HOURS PRN
Status: DISCONTINUED | OUTPATIENT
Start: 2020-01-12 | End: 2020-01-12 | Stop reason: HOSPADM

## 2020-01-12 RX ORDER — HYDROCHLOROTHIAZIDE 25 MG/1
25 TABLET ORAL DAILY
Status: DISCONTINUED | OUTPATIENT
Start: 2020-01-12 | End: 2020-01-12

## 2020-01-12 RX ORDER — SODIUM CHLORIDE 0.9 % (FLUSH) 0.9 %
10 SYRINGE (ML) INJECTION
Status: DISCONTINUED | OUTPATIENT
Start: 2020-01-12 | End: 2020-01-12 | Stop reason: HOSPADM

## 2020-01-12 RX ORDER — FERROUS SULFATE 325(65) MG
325 TABLET, DELAYED RELEASE (ENTERIC COATED) ORAL DAILY
Status: DISCONTINUED | OUTPATIENT
Start: 2020-01-12 | End: 2020-01-12 | Stop reason: HOSPADM

## 2020-01-12 RX ORDER — ACETAMINOPHEN 325 MG/1
650 TABLET ORAL EVERY 4 HOURS PRN
Status: DISCONTINUED | OUTPATIENT
Start: 2020-01-12 | End: 2020-01-12 | Stop reason: HOSPADM

## 2020-01-12 RX ORDER — ONDANSETRON 2 MG/ML
4 INJECTION INTRAMUSCULAR; INTRAVENOUS EVERY 8 HOURS PRN
Status: DISCONTINUED | OUTPATIENT
Start: 2020-01-12 | End: 2020-01-12 | Stop reason: HOSPADM

## 2020-01-12 RX ORDER — METOPROLOL SUCCINATE 50 MG/1
50 TABLET, EXTENDED RELEASE ORAL DAILY
Status: DISCONTINUED | OUTPATIENT
Start: 2020-01-12 | End: 2020-01-12 | Stop reason: HOSPADM

## 2020-01-12 RX ORDER — ENOXAPARIN SODIUM 100 MG/ML
40 INJECTION SUBCUTANEOUS EVERY 24 HOURS
Status: DISCONTINUED | OUTPATIENT
Start: 2020-01-12 | End: 2020-01-12 | Stop reason: HOSPADM

## 2020-01-12 RX ORDER — MUPIROCIN 20 MG/G
OINTMENT TOPICAL 2 TIMES DAILY
Qty: 15 G | Refills: 0 | Status: SHIPPED | OUTPATIENT
Start: 2020-01-12 | End: 2020-01-18

## 2020-01-12 RX ORDER — FOLIC ACID 1 MG/1
1 TABLET ORAL DAILY
Status: DISCONTINUED | OUTPATIENT
Start: 2020-01-12 | End: 2020-01-12 | Stop reason: HOSPADM

## 2020-01-12 RX ORDER — ALBUTEROL SULFATE 90 UG/1
1-2 AEROSOL, METERED RESPIRATORY (INHALATION) EVERY 6 HOURS PRN
Qty: 1 INHALER | Refills: 0 | Status: SHIPPED | OUTPATIENT
Start: 2020-01-12 | End: 2020-06-01

## 2020-01-12 RX ADMIN — HYDROCODONE BITARTRATE AND ACETAMINOPHEN 1 TABLET: 10; 325 TABLET ORAL at 02:01

## 2020-01-12 RX ADMIN — FERROUS SULFATE TAB EC 325 MG (65 MG FE EQUIVALENT) 325 MG: 325 (65 FE) TABLET DELAYED RESPONSE at 08:01

## 2020-01-12 RX ADMIN — ASPIRIN 81 MG: 81 TABLET, COATED ORAL at 08:01

## 2020-01-12 RX ADMIN — FOLIC ACID 1 MG: 1 TABLET ORAL at 08:01

## 2020-01-12 RX ADMIN — NIFEDIPINE 90 MG: 30 TABLET, FILM COATED, EXTENDED RELEASE ORAL at 08:01

## 2020-01-12 RX ADMIN — CLINDAMYCIN IN 5 PERCENT DEXTROSE 600 MG: 12 INJECTION, SOLUTION INTRAVENOUS at 02:01

## 2020-01-12 RX ADMIN — CLINDAMYCIN IN 5 PERCENT DEXTROSE 600 MG: 12 INJECTION, SOLUTION INTRAVENOUS at 08:01

## 2020-01-12 RX ADMIN — METOPROLOL SUCCINATE 50 MG: 50 TABLET, EXTENDED RELEASE ORAL at 08:01

## 2020-01-12 NOTE — SUBJECTIVE & OBJECTIVE
Past Medical History:   Diagnosis Date    Arthritis     Back pain     Chronic kidney disease     stage 3    COPD (chronic obstructive pulmonary disease)     Esophageal reflux     HIV (human immunodeficiency virus infection)     2006    HIV infection     Hypertension     Lumbago     Psoriasis     Skin disorder     Tobacco use        Past Surgical History:   Procedure Laterality Date    FINGER SURGERY      TONSILLECTOMY         Review of patient's allergies indicates:   Allergen Reactions    Penicillins Hives    Sustiva [efavirenz] Other (See Comments)     insomnia       No current facility-administered medications on file prior to encounter.      Current Outpatient Medications on File Prior to Encounter   Medication Sig    aspirin (ECOTRIN) 81 MG EC tablet Take 1 tablet (81 mg total) by mouth once daily. (Patient taking differently: Take 81 mg by mouth 3 (three) times a week. )    b complex vitamins tablet Take 1 tablet by mouth once daily.    darunavir-lisa-emtri-tenof ala (SYMTUZA) 840-790-384-10 mg Tab Take 1 tablet by mouth once daily. Stop truvada and prezcobix    ferrous sulfate (FEOSOL) 325 mg (65 mg iron) Tab tablet Take 1 tablet (325 mg total) by mouth once daily.    folic acid (FOLVITE) 1 MG tablet Take 1 tablet (1 mg total) by mouth once daily.    hydroCHLOROthiazide (HYDRODIURIL) 25 MG tablet Take 1 tablet (25 mg total) by mouth once daily.    HYDROcodone-acetaminophen (NORCO) 7.5-325 mg per tablet Take 1 tablet by mouth every 6 (six) hours as needed. Do not fill until 1/12/20. 90 to last 30 days    methotrexate 2.5 MG Tab Take 4 tablets (10 mg total) by mouth every 7 days.    metoprolol succinate (TOPROL-XL) 50 MG 24 hr tablet Take 1 tablet (50 mg total) by mouth once daily.    naproxen (NAPROSYN) 500 MG tablet Take 1 tablet (500 mg total) by mouth 2 (two) times daily with meals.    pravastatin (PRAVACHOL) 40 MG tablet Take 1 tablet (40 mg total) by mouth every evening.     zolpidem (AMBIEN) 10 mg Tab Take 1 tablet (10 mg total) by mouth nightly.    albuterol (PROVENTIL/VENTOLIN HFA) 90 mcg/actuation inhaler Inhale 1-2 puffs into the lungs every 6 (six) hours as needed for Wheezing (sob).    azithromycin (ZITHROMAX) 600 MG Tab Take 2 tablets (1,200 mg total) by mouth every 7 days.    fluocinonide 0.05% (LIDEX) 0.05 % cream Apply 1 application topically 2 (two) times daily.    NIFEdipine (PROCARDIA-XL) 90 MG (OSM) 24 hr tablet Take 1 tablet (90 mg total) by mouth once daily.    ondansetron (ZOFRAN-ODT) 4 MG TbDL Take 1 tablet (4 mg total) by mouth every 6 (six) hours as needed (nausea).    [DISCONTINUED] nebivolol (BYSTOLIC) 10 MG Tab Take 1 tablet (10 mg total) by mouth once daily.     Family History     Problem Relation (Age of Onset)    Arthritis Mother, Sister    Hypertension Mother, Sister    No Known Problems Father        Tobacco Use    Smoking status: Current Every Day Smoker     Packs/day: 0.25     Years: 40.00     Pack years: 10.00     Types: Cigarettes    Smokeless tobacco: Never Used   Substance and Sexual Activity    Alcohol use: Yes     Alcohol/week: 0.0 standard drinks     Comment: occassional beer; pt drank beer this am    Drug use: Yes     Types: Marijuana     Comment: occasional use of marijuana    Sexual activity: Never     Partners: Male     Birth control/protection: Condom     Review of Systems   Constitutional: Negative for chills and fever.   Eyes: Negative for photophobia.   Respiratory: Negative for cough, chest tightness, shortness of breath and wheezing.    Cardiovascular: Positive for leg swelling. Negative for chest pain and palpitations.   Gastrointestinal: Negative for abdominal pain, diarrhea, nausea and vomiting.   Genitourinary: Negative for dysuria, flank pain and hematuria.   Musculoskeletal: Negative for back pain, myalgias and neck pain.   Skin: Positive for color change. Negative for rash and wound.   Neurological: Negative for  dizziness, syncope and headaches.   Psychiatric/Behavioral: Negative for agitation.     Objective:     Vital Signs (Most Recent):  Temp: 98.2 °F (36.8 °C) (01/12/20 0423)  Pulse: 84 (01/12/20 0423)  Resp: 16 (01/12/20 0423)  BP: (!) 166/78 (01/12/20 0423)  SpO2: 99 % (01/12/20 0400) Vital Signs (24h Range):  Temp:  [98.2 °F (36.8 °C)-99.5 °F (37.5 °C)] 98.2 °F (36.8 °C)  Pulse:  [77-84] 84  Resp:  [16-19] 16  SpO2:  [98 %-100 %] 99 %  BP: (133-199)/(75-97) 166/78     Weight: 62.5 kg (137 lb 12.6 oz)  Body mass index is 19.77 kg/m².    Physical Exam   Constitutional: He is oriented to person, place, and time. He appears well-developed and well-nourished. No distress.   HENT:   Head: Normocephalic and atraumatic.   Eyes: Pupils are equal, round, and reactive to light. Conjunctivae are normal.   Neck: Normal range of motion. Neck supple. No JVD present.   Cardiovascular: Normal rate, regular rhythm, normal heart sounds and intact distal pulses.   Pulmonary/Chest: Effort normal and breath sounds normal. No respiratory distress. He has no wheezes.   Abdominal: Soft. Bowel sounds are normal. He exhibits no distension. There is no tenderness. There is no guarding.   Musculoskeletal: Normal range of motion. He exhibits no edema or tenderness.   Neurological: He is alert and oriented to person, place, and time.   Skin: Capillary refill takes less than 2 seconds. No erythema.   Mild erythema and edema noted to right lower extremity, no edema. +several scaly patches to upper/lower extremities consistent with psoriasis    Psychiatric: He has a normal mood and affect. His behavior is normal.         CRANIAL NERVES     CN III, IV, VI   Pupils are equal, round, and reactive to light.       Significant Labs:   BMP:   Recent Labs   Lab 01/12/20  0038      *   K 3.8   CL 95   CO2 23   BUN 34*   CREATININE 1.7*   CALCIUM 9.1     CBC:   Recent Labs   Lab 01/12/20  0038   WBC 14.89*   HGB 10.6*   HCT 32.0*           Significant Imaging: I have reviewed all pertinent imaging results/findings within the past 24 hours.

## 2020-01-12 NOTE — PLAN OF CARE
Got a call for ID consult but no order is found on the chart.     Patient with PEN allergy and has possible cellulitis     Rec:   1. BC x 2  2. UA and urinalysis   3. OK to continue clinda for now    More recs after exam and after consult order is placed     ID cannot place orders unless there is an consult order - reached out to Ms Emir RAMÍREZ wrote H and P this AM     [facility] [phone]     As per RN note at 1 PM - patient to be DC   No Id consult was performed     Please re-call if needd on this case

## 2020-01-12 NOTE — ASSESSMENT & PLAN NOTE
Hyperlipidemia     Per chart review patient noncompliant with antihypertensives.   Continue metoprolol 50 mg daily, nifedipine 90 mg daily. Hold HCTZ Cr 1.7   Continue statin

## 2020-01-12 NOTE — HOSPITAL COURSE
He was given IV clindamycin overnight. He had very dry skin of his feet and toes and very thickened toenails, which he was advised can easily lead to cellulitis. He was discharged the next day with prescriptions for 6 days of clindamycin and topical mupirocin.

## 2020-01-12 NOTE — PLAN OF CARE
Discharge orders noted, no HH or HME ordered.    Future Appointments   Date Time Provider Department Center   1/30/2020 10:20 AM [facility] LAB [facility] LAB [facility]   2/5/2020 10:00 AM ORTHO CLINIC, A [facility]O [facility]   2/13/2020 11:00 AM Marisol Tejada MD [facility] INFECT [facility]       Pt's nurse will go over medications/signs and symptoms prior to discharge       01/12/20 1301   Final Note   Assessment Type Final Discharge Note   Anticipated Discharge Disposition Home   What phone number can be called within the next 1-3 days to see how you are doing after discharge? [phone]   Hospital Follow Up  Appt(s) scheduled? No  (Offices closed for Weekend. Patient to schedule own follow up appointment.  )   Right Care Referral Info   Post Acute Recommendation No Care     Martita Denny, RN Transitional Navigator  [phone]

## 2020-01-12 NOTE — DISCHARGE SUMMARY
[facility] Medicine  Discharge Summary      Patient Name: Andrew Velazquez  MRN: [id number]  Admission Date: 1/11/2020  Hospital Length of Stay: 0 days  Discharge Date and Time: 1/12/2020  2:00 PM  Attending Physician: Errol Barreto MD   Discharging Provider: Errol Barreto MD  Primary Care Provider: Marisol Tejada MD      HPI:   Andrew Velazquez is a 59 year old black man with hypertension, hyperlipidemia, chronic kidney disease stage 3, human immunodeficiency virus (HIV) with acquired immune deficiency syndrome (AIDS), psoriasis, diverticulosis, cigarette smoking with chronic obstructive pulmonary disease, history of left pontine lacunar stroke, gastroesophageal reflux disease, iron deficiency anemia, arthritis, lumbago. He lives in [place], [place]. His infectious disease doctor is Dr. Marisol Tejada.    Her presented to [facility] emergency department on 1/11/20 with right lower extremity pain from the thigh to ankle associated with erythema and edema, with no preceding trauma. He reported similar symptoms of his left lower extremity a few months ago. Labs showed elevated WBC count (43088), elevated sed rate (>120), elevated CRP (250). X-ray of his leg showed mild soft tissue swelling at the ankle. Venous ultrasound showed no deep venous thrombosis. He was given clindamycin in the emergency department. He was admitted to [facility] for observation.       Hospital Course:   He was given IV clindamycin overnight. He had very dry skin of his feet and toes and very thickened toenails, which he was advised can easily lead to cellulitis. He was discharged the next day with prescriptions for 6 days of clindamycin and topical mupirocin.     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] 01/12/2020 Yes    Chronic kidney  disease, stage 3 [N18.3]  Yes     Chronic    Old left pontine lacunar stroke without late effect [Z86.73] 04/03/2019 Not Applicable     Chronic    AIDS (acquired immune deficiency syndrome) [B20] 07/15/2014 Yes     Chronic    COPD (chronic obstructive pulmonary disease) [J44.9] 07/15/2014 Yes     Chronic    GERD (gastroesophageal reflux disease) [K21.9] 07/15/2014 Yes    HTN (hypertension) [I10] 07/15/2014 Yes     Chronic    HLD (hyperlipidemia) [E78.5] 07/15/2014 Yes    Psoriasis [L40.9] 07/15/2014 Yes     Chronic    Tobacco abuse [Z72.0] 07/15/2014 Yes     Chronic    JANA (iron deficiency anemia) [D50.9] 07/15/2014 Yes      Problems Resolved During this Admission:       Discharged Condition: good    Disposition: Home or Self Care    Follow Up:  Follow-up Information     Marisol Tejada MD.    Specialties:  Infectious Diseases, Obstetrics and Gynecology  Contact information:  [address] [facility] [address]  [place] [address]  [phone]                 Patient Instructions:      Diet Adult Regular     Notify your health care provider if you experience any of the following:  persistent nausea and vomiting or diarrhea     Notify your health care provider if you experience any of the following:  worsening rash     Notify your health care provider if you experience any of the following:  redness, tenderness, or signs of infection (pain, swelling, redness, odor or green/yellow discharge around incision site)     Other restrictions (specify):   Order Comments: Keep right leg elevated when at rest to relieve swelling     Activity as tolerated        Significant Diagnostic Studies:   X-Ray Tibia Fibula 2 View Right 1/12/20:   FINDINGS:  Radiographic examination of the right tibia and fibula was performed.  For radiographs are submitted, there is no prior examination available for comparison.  There is appearance of may relate to mild soft tissue swelling about the ankle medially and laterally for which clinical and historical  correlation is needed.  Vascular calcifications within the soft tissues are noted.  The osseous structures demonstrate chronic change, there is diminished mineralization and degenerative change noted however there is no radiographic evidence for osseous destructive process, acute fracture or dislocation.  Impression: Chronic changes are noted there is no radiographic evidence for acute fracture or dislocation.  There is suggestion of mild soft tissue swelling at the ankle for which clinical and historical correlation is needed.  US Lower Extremity Veins Right 1/12/20: FINDINGS:  There is normal color Doppler interrogation, compression and distal augmentation of the right common femoral, femoral, greater saphenous, popliteal, posterior tibial, anterior tibial, and peroneal veins.     Medications:  Reconciled Home Medications:      Medication List      START taking these medications    clindamycin 300 MG capsule  Commonly known as:  CLEOCIN  Take 1 capsule (300 mg total) by mouth 4 (four) times daily. for 6 days     mupirocin 2 % ointment  Commonly known as:  BACTROBAN  Apply topically 2 (two) times daily. Right leg for 6 days        CHANGE how you take these medications    aspirin 81 MG EC tablet  Commonly known as:  ECOTRIN  Take 1 tablet (81 mg total) by mouth once daily.  What changed:  when to take this        CONTINUE taking these medications    albuterol 90 mcg/actuation inhaler  Commonly known as:  PROVENTIL/VENTOLIN HFA  Inhale 1-2 puffs into the lungs every 6 (six) hours as needed for Wheezing (sob).     b complex vitamins tablet  Take 1 tablet by mouth once daily.     darunavir-lisa-emtri-tenof ala [id number] mg Tab  Commonly known as:  Symtuza  Take 1 tablet by mouth once daily. Stop truvada and prezcobix     ferrous sulfate 325 mg (65 mg iron) Tab tablet  Commonly known as:  FEOSOL  Take 1 tablet (325 mg total) by mouth once daily.     folic acid 1 MG tablet  Commonly known as:  FOLVITE  Take 1 tablet  (1 mg total) by mouth once daily.     hydroCHLOROthiazide 25 MG tablet  Commonly known as:  HYDRODIURIL  Take 1 tablet (25 mg total) by mouth once daily.     HYDROcodone-acetaminophen 7.5-325 mg per tablet  Commonly known as:  NORCO  Take 1 tablet by mouth every 6 (six) hours as needed. Do not fill until 1/12/20. 90 to last 30 days     metoprolol succinate 50 MG 24 hr tablet  Commonly known as:  TOPROL-XL  Take 1 tablet (50 mg total) by mouth once daily.     naproxen 500 MG tablet  Commonly known as:  NAPROSYN  Take 1 tablet (500 mg total) by mouth 2 (two) times daily with meals.     NIFEdipine 90 MG (OSM) 24 hr tablet  Commonly known as:  PROCARDIA-XL  Take 1 tablet (90 mg total) by mouth once daily.     pravastatin 40 MG tablet  Commonly known as:  PRAVACHOL  Take 1 tablet (40 mg total) by mouth every evening.     zolpidem 10 mg Tab  Commonly known as:  AMBIEN  Take 1 tablet (10 mg total) by mouth nightly.        STOP taking these medications    azithromycin 600 MG Tab  Commonly known as:  ZITHROMAX     fluocinonide 0.05% 0.05 % cream  Commonly known as:  LIDEX     methotrexate 2.5 MG Tab     ondansetron 4 MG Tbdl  Commonly known as:  ZOFRAN-ODT            Indwelling Lines/Drains at time of discharge: None    Time spent on the discharge of patient: 35 minutes  Patient was seen and examined on the date of discharge and determined to be suitable for discharge.         Errol Barreto MD  Department of Hospital Medicine  [facility]

## 2020-01-12 NOTE — H&P
[facility] Medicine  History & Physical    Patient Name: Andrew Velazquez  MRN: [id number]  Admission Date: 1/11/2020  Attending Physician: Brennon Osorio DO   Primary Care Provider: Marisol Tejada MD         Patient information was obtained from patient, past medical records and ER records.     Subjective:     Principal Problem:Cellulitis of right lower extremity    Chief Complaint:   Chief Complaint   Patient presents with    Knee Pain     59y M to ED with c/o pain to right knee/leg. pt is chronic pain pt        HPI: Andrew Velazquez is a 58 yo male  with a past medical history of Arthritis, Back pain, Chronic kidney disease, COPD (chronic obstructive pulmonary disease), Esophageal reflux, HIV (human immunodeficiency virus infection), Hypertension, Lumbago, Psoriasis, Skin disorder, and Tobacco use who presented to [facility] ED 1/11/20 for evaluation of right lower extremity pain (from thigh down to ankle) associated with erythema and edema. Pt denies trauma. No fever/chills. No chest pain, SOB, abd pain or n/v. Patient states similar symptoms to left lower extremity a few months ago.   Labs remarkable for wbc 14, sed rate > 120, , bun/cR 34/1.7. Xray right tib/fib shows mild soft tissue swelling at the ankle. Venous ultrasound negative for DVT. He received clindamycin in ED. Patient admitted to [facility].     Past Medical History:   Diagnosis Date    Arthritis     Back pain     Chronic kidney disease     stage 3    COPD (chronic obstructive pulmonary disease)     Esophageal reflux     HIV (human immunodeficiency virus infection)     2006    HIV infection     Hypertension     Lumbago     Psoriasis     Skin disorder     Tobacco use        Past Surgical History:   Procedure Laterality Date    FINGER SURGERY      TONSILLECTOMY         Review of patient's allergies indicates:   Allergen Reactions    Penicillins Hives    Sustiva [efavirenz] Other (See  Comments)     insomnia       No current facility-administered medications on file prior to encounter.      Current Outpatient Medications on File Prior to Encounter   Medication Sig    aspirin (ECOTRIN) 81 MG EC tablet Take 1 tablet (81 mg total) by mouth once daily. (Patient taking differently: Take 81 mg by mouth 3 (three) times a week. )    b complex vitamins tablet Take 1 tablet by mouth once daily.    darunavir-lisa-emtri-tenof ala (SYMTUZA) [id number] mg Tab Take 1 tablet by mouth once daily. Stop truvada and prezcobix    ferrous sulfate (FEOSOL) 325 mg (65 mg iron) Tab tablet Take 1 tablet (325 mg total) by mouth once daily.    folic acid (FOLVITE) 1 MG tablet Take 1 tablet (1 mg total) by mouth once daily.    hydroCHLOROthiazide (HYDRODIURIL) 25 MG tablet Take 1 tablet (25 mg total) by mouth once daily.    HYDROcodone-acetaminophen (NORCO) 7.5-325 mg per tablet Take 1 tablet by mouth every 6 (six) hours as needed. Do not fill until 1/12/20. 90 to last 30 days    methotrexate 2.5 MG Tab Take 4 tablets (10 mg total) by mouth every 7 days.    metoprolol succinate (TOPROL-XL) 50 MG 24 hr tablet Take 1 tablet (50 mg total) by mouth once daily.    naproxen (NAPROSYN) 500 MG tablet Take 1 tablet (500 mg total) by mouth 2 (two) times daily with meals.    pravastatin (PRAVACHOL) 40 MG tablet Take 1 tablet (40 mg total) by mouth every evening.    zolpidem (AMBIEN) 10 mg Tab Take 1 tablet (10 mg total) by mouth nightly.    albuterol (PROVENTIL/VENTOLIN HFA) 90 mcg/actuation inhaler Inhale 1-2 puffs into the lungs every 6 (six) hours as needed for Wheezing (sob).    azithromycin (ZITHROMAX) 600 MG Tab Take 2 tablets (1,200 mg total) by mouth every 7 days.    fluocinonide 0.05% (LIDEX) 0.05 % cream Apply 1 application topically 2 (two) times daily.    NIFEdipine (PROCARDIA-XL) 90 MG (OSM) 24 hr tablet Take 1 tablet (90 mg total) by mouth once daily.    ondansetron (ZOFRAN-ODT) 4 MG TbDL Take 1 tablet  (4 mg total) by mouth every 6 (six) hours as needed (nausea).    [DISCONTINUED] nebivolol (BYSTOLIC) 10 MG Tab Take 1 tablet (10 mg total) by mouth once daily.     Family History     Problem Relation (Age of Onset)    Arthritis Mother, Sister    Hypertension Mother, Sister    No Known Problems Father        Tobacco Use    Smoking status: Current Every Day Smoker     Packs/day: 0.25     Years: 40.00     Pack years: 10.00     Types: Cigarettes    Smokeless tobacco: Never Used   Substance and Sexual Activity    Alcohol use: Yes     Alcohol/week: 0.0 standard drinks     Comment: occassional beer; pt drank beer this am    Drug use: Yes     Types: Marijuana     Comment: occasional use of marijuana    Sexual activity: Never     Partners: Male     Birth control/protection: Condom     Review of Systems   Constitutional: Negative for chills and fever.   Eyes: Negative for photophobia.   Respiratory: Negative for cough, chest tightness, shortness of breath and wheezing.    Cardiovascular: Positive for leg swelling. Negative for chest pain and palpitations.   Gastrointestinal: Negative for abdominal pain, diarrhea, nausea and vomiting.   Genitourinary: Negative for dysuria, flank pain and hematuria.   Musculoskeletal: Negative for back pain, myalgias and neck pain.   Skin: Positive for color change. Negative for rash and wound.   Neurological: Negative for dizziness, syncope and headaches.   Psychiatric/Behavioral: Negative for agitation.     Objective:     Vital Signs (Most Recent):  Temp: 98.2 °F (36.8 °C) (01/12/20 0423)  Pulse: 84 (01/12/20 0423)  Resp: 16 (01/12/20 0423)  BP: (!) 166/78 (01/12/20 0423)  SpO2: 99 % (01/12/20 0400) Vital Signs (24h Range):  Temp:  [98.2 °F (36.8 °C)-99.5 °F (37.5 °C)] 98.2 °F (36.8 °C)  Pulse:  [77-84] 84  Resp:  [16-19] 16  SpO2:  [98 %-100 %] 99 %  BP: (133-199)/(75-97) 166/78     Weight: 62.5 kg (137 lb 12.6 oz)  Body mass index is 19.77 kg/m².    Physical Exam   Constitutional: He  is oriented to person, place, and time. He appears well-developed and well-nourished. No distress.   HENT:   Head: Normocephalic and atraumatic.   Eyes: Pupils are equal, round, and reactive to light. Conjunctivae are normal.   Neck: Normal range of motion. Neck supple. No JVD present.   Cardiovascular: Normal rate, regular rhythm, normal heart sounds and intact distal pulses.   Pulmonary/Chest: Effort normal and breath sounds normal. No respiratory distress. He has no wheezes.   Abdominal: Soft. Bowel sounds are normal. He exhibits no distension. There is no tenderness. There is no guarding.   Musculoskeletal: Normal range of motion. He exhibits no edema or tenderness.   Neurological: He is alert and oriented to person, place, and time.   Skin: Capillary refill takes less than 2 seconds. No erythema.   Mild erythema and edema noted to right lower extremity, no edema. +several scaly patches to upper/lower extremities consistent with psoriasis    Psychiatric: He has a normal mood and affect. His behavior is normal.         CRANIAL NERVES     CN III, IV, VI   Pupils are equal, round, and reactive to light.       Significant Labs:   BMP:   Recent Labs   Lab 01/12/20  0038      *   K 3.8   CL 95   CO2 23   BUN 34*   CREATININE 1.7*   CALCIUM 9.1     CBC:   Recent Labs   Lab 01/12/20  0038   WBC 14.89*   HGB 10.6*   HCT 32.0*          Significant Imaging: I have reviewed all pertinent imaging results/findings within the past 24 hours.    Assessment/Plan:     * Cellulitis of right lower extremity  Continue clindamycin, ID consulted for antibiotic management immunocompromised patient       History of CVA (cerebrovascular accident) without residual deficits, left christy  No deficits. Continue ASA and statin       Tobacco abuse  Encourage tobacco cessation       COPD (chronic obstructive pulmonary disease)  Chronic, stable. Prn nebs       JANA (iron deficiency anemia)  H/H stable, monitor       GERD  (gastroesophageal reflux disease)  PPI       Psoriasis  Denies taking methotrexate       HTN (hypertension)  Hyperlipidemia     Per chart review patient noncompliant with antihypertensives.   Continue metoprolol 50 mg daily, nifedipine 90 mg daily. Hold HCTZ Cr 1.7   Continue statin       AIDS (acquired immune deficiency syndrome)  Antiviral Symtuza not on hospital formulary         VTE Risk Mitigation (From admission, onward)         Ordered     enoxaparin injection 40 mg  Daily      01/12/20 0437     IP VTE HIGH RISK PATIENT  Once      01/12/20 0437                   Nicole Field NP  Department of Hospital Medicine   [facility]

## 2020-01-12 NOTE — PLAN OF CARE
Progress notes reviewed. Evening rounds completed. Admit questions and Med rec completed. Introduced self as VN for this shift. Educated pt on VN's role in patient's care.  Plan of care reviewed with patient. Opportunity given for pt's questions. No questions or concerns expressed at this time. VN to continue to monitor.

## 2020-01-12 NOTE — ED PROVIDER NOTES
Encounter Date: 1/11/2020    SCRIBE #1 NOTE: I, Fallon Petersen, am scribing for, and in the presence of,  Dr. Noel. I have scribed the following portions of the note - Other sections scribed: HPI, ROS, PE.       History     Chief Complaint   Patient presents with    Knee Pain     59y M to ED with c/o pain to right knee/leg. pt is chronic pain pt     Andrew Velazquez is a 58 y/o male with HTN, CKD 3, COPD, HIV who presents to the ED complaining of pain from right thigh to the ankle for the last couple days. Pain is worse with movement and palpation.  Patient states he has been taking Ibuprofen with minimal relief. Denies any injury to the leg, fever, chills, n/v/d, or hx of DVT.      The history is provided by the patient. No  was used.     Review of patient's allergies indicates:   Allergen Reactions    Penicillins Hives    Sustiva [efavirenz] Other (See Comments)     insomnia     Past Medical History:   Diagnosis Date    Arthritis     Back pain     Chronic kidney disease     stage 3    COPD (chronic obstructive pulmonary disease)     Esophageal reflux     HIV (human immunodeficiency virus infection)     2006    HIV infection     Hypertension     Lumbago     Psoriasis     Skin disorder     Tobacco use      Past Surgical History:   Procedure Laterality Date    FINGER SURGERY      TONSILLECTOMY       Family History   Problem Relation Age of Onset    Arthritis Mother     Hypertension Mother     Hypertension Sister     Arthritis Sister     No Known Problems Father      Social History     Tobacco Use    Smoking status: Current Every Day Smoker     Packs/day: 0.25     Years: 40.00     Pack years: 10.00     Types: Cigarettes    Smokeless tobacco: Never Used   Substance Use Topics    Alcohol use: Yes     Alcohol/week: 0.0 standard drinks     Comment: occassional beer; pt drank beer this am    Drug use: Yes     Types: Marijuana     Comment: occasional use of marijuana     Review  of Systems   Constitutional: Negative for fever.   HENT: Negative for congestion.    Respiratory: Negative for shortness of breath.    Cardiovascular: Negative for chest pain.   Gastrointestinal: Negative for abdominal pain and vomiting.   Genitourinary: Negative for dysuria.   Musculoskeletal: Positive for arthralgias. Negative for myalgias.   Skin: Negative for rash.   Neurological: Negative for headaches.   Psychiatric/Behavioral: Negative for behavioral problems.   All other systems reviewed and are negative.      Physical Exam     Initial Vitals [01/11/20 2238]   BP Pulse Resp Temp SpO2   (!) 199/92 77 16 98.3 °F (36.8 °C) 98 %      MAP       --         Physical Exam    Nursing note and vitals reviewed.  Constitutional: Vital signs are normal. He appears well-developed and well-nourished. He is not diaphoretic. No distress.   HENT:   Head: Normocephalic and atraumatic.   Right Ear: External ear normal.   Left Ear: External ear normal.   Nose: Nose normal.   Eyes: Lids are normal.   Neck: Trachea normal, normal range of motion and phonation normal. Neck supple.   Cardiovascular: Normal rate, regular rhythm, normal heart sounds, intact distal pulses and normal pulses.   Pulmonary/Chest: Breath sounds normal. No respiratory distress. He has no wheezes. He has no rhonchi. He has no rales.   Abdominal: Soft. Normal appearance and bowel sounds are normal. He exhibits no distension. There is no tenderness. There is no rebound.   Musculoskeletal: He exhibits edema and tenderness.   RLE: Diffuse tenderness, edema, and erythema extending from the right thigh down to the calf.   Neurological: He is alert and oriented to person, place, and time. GCS eye subscore is 4. GCS verbal subscore is 5. GCS motor subscore is 6.   Skin: Skin is warm, dry and intact. Capillary refill takes less than 2 seconds. There is erythema.   Psychiatric: He has a normal mood and affect. His speech is normal and behavior is normal. Judgment and  thought content normal. Cognition and memory are normal.         ED Course   Procedures  Labs Reviewed   CBC W/ AUTO DIFFERENTIAL - Abnormal; Notable for the following components:       Result Value    WBC 14.89 (*)     RBC 3.71 (*)     Hemoglobin 10.6 (*)     Hematocrit 32.0 (*)     RDW 17.4 (*)     Gran # (ANC) 10.3 (*)     All other components within normal limits   COMPREHENSIVE METABOLIC PANEL - Abnormal; Notable for the following components:    Sodium 131 (*)     BUN, Bld 34 (*)     Creatinine 1.7 (*)     Albumin 2.9 (*)     ALT 9 (*)     eGFR if  50 (*)     eGFR if non  43 (*)     All other components within normal limits   C-REACTIVE PROTEIN - Abnormal; Notable for the following components:    .9 (*)     All other components within normal limits   SEDIMENTATION RATE          Imaging Results          X-Ray Tibia Fibula 2 View Right (Final result)  Result time 01/12/20 01:31:08    Final result by Rao Winslow MD (01/12/20 01:31:08)                 Impression:      Chronic changes are noted there is no radiographic evidence for acute fracture or dislocation.    There is suggestion of mild soft tissue swelling at the ankle for which clinical and historical correlation is needed.    Clinical and historical correlation otherwise needed to determine need for additional follow-up.      Electronically signed by: Rao Winslow  Date:    01/12/2020  Time:    01:31             Narrative:    EXAMINATION:  XR TIBIA FIBULA 2 VIEW RIGHT    CLINICAL HISTORY:  Pain in right leg    TECHNIQUE:  AP and lateral views of the right tibia and fibula were performed.    COMPARISON:  None.    FINDINGS:  Radiographic examination of the right tibia and fibula was performed.  For radiographs are submitted, there is no prior examination available for comparison.  There is appearance of may relate to mild soft tissue swelling about the ankle medially and laterally for which clinical and  "historical correlation is needed.  Vascular calcifications within the soft tissues are noted.  The osseous structures demonstrate chronic change, there is diminished mineralization and degenerative change noted however there is no radiographic evidence for osseous destructive process, acute fracture or dislocation.                               US Lower Extremity Veins Right (Final result)  Result time 01/12/20 01:10:54    Final result by Yaneli Oliveira MD (01/12/20 01:10:54)                 Impression:      No evidence of right deep venous thrombosis.      Electronically signed by: Yaneli Oliveira  Date:    01/12/2020  Time:    01:10             Narrative:    EXAMINATION:  ULTRASOUND LOWER EXTREMITY VEINS RIGHT    CLINICAL HISTORY:  Pain in right leg    TECHNIQUE:  Grayscale imaging of the right lower extremity to evaluate the deep and superficial venous system with color Doppler interrogation and Spectral Doppler wave form analysis was performed.    COMPARISON:  None.    FINDINGS:  There is normal color Doppler interrogation, compression and distal augmentation of the right common femoral, femoral, greater saphenous, popliteal, posterior tibial, anterior tibial, and peroneal veins.                                 Medical Decision Making:   Initial Assessment:   Andrew Velazquez is a 60 y/o male with HTN who presents to the ED complaining of pain from right thigh to the ankle for the last couple days.    Plans for US lower extremity, tibia fibula x-ray, C-reactive protein, sedimentation, CBC, and CMP.   Differential Diagnosis:   DVT, cellulitis  Independently Interpreted Test(s):   I have ordered and independently interpreted X-rays - see prior notes.  Clinical Tests:   Lab Tests: Ordered and Reviewed  Radiological Study: Ordered and Reviewed  ED Management:  - XR with soft tissue swelling  - US negative for DVT but "cobblestoning" consistent with cellulitis seen in soft tissue.  -   - + " leukocytosis    Patient given IV clindamycin and will place in CDU for further management.   Discussed with [facility] hospitalist.               [place]ie Attestation:   Scribe #1: I performed the above scribed service and the documentation accurately describes the services I performed. I attest to the accuracy of the note.            ED Course as of Jan 12 0239   Sat Jan 11, 2020 2354 BP(!): 199/92 [LD]   2354 Temp: 98.3 °F (36.8 °C) [LD]   2355 Pulse: 77 [LD]   2355 Resp: 16 [LD]   2355 SpO2: 98 % [LD]   Sun Jan 12, 2020 0220 [facility] hospitalist consulted.      [LD]   0225 Discussed with Emir who will place patient in CDU    [LD]      ED Course User Index  [LD] Cherry Noel MD                Clinical Impression:     1. Right leg pain    2. Cellulitis of right lower extremity          Disposition:   Disposition: Placed in Observation  Condition: Stable        I, Cherry Noel,  personally performed the services described in this documentation. All medical record entries made by the scribe were at my direction and in my presence.  I have reviewed the chart and agree that the record reflects my personal performance and is accurate and complete. Cherry Noel M.D. 2:39 AM01/12/2020                 Cherry Noel MD  01/12/20 0239

## 2020-01-12 NOTE — HPI
Andrew Velazquez is a 59 year old black man with hypertension, hyperlipidemia, chronic kidney disease stage 3, human immunodeficiency virus (HIV) with acquired immune deficiency syndrome (AIDS), psoriasis, diverticulosis, cigarette smoking with chronic obstructive pulmonary disease, history of left pontine lacunar stroke, gastroesophageal reflux disease, iron deficiency anemia, arthritis, lumbago. He lives in [place], [place]. His infectious disease doctor is Dr. Marisol Tejada.    Her presented to [facility] emergency department on 1/11/20 with right lower extremity pain from the thigh to ankle associated with erythema and edema, with no preceding trauma. He reported similar symptoms of his left lower extremity a few months ago. Labs showed elevated WBC count (72772), elevated sed rate (>120), elevated CRP (250). X-ray of his leg showed mild soft tissue swelling at the ankle. Venous ultrasound showed no deep venous thrombosis. He was given clindamycin in the emergency department. He was admitted to [facility] for observation.

## 2020-01-30 ENCOUNTER — HOSPITAL ENCOUNTER (EMERGENCY)
Facility: HOSPITAL | Age: 60
Discharge: HOME | End: 2020-01-30
Attending: SURGERY
Payer: MEDICAID

## 2020-01-30 ENCOUNTER — LAB VISIT (OUTPATIENT)
Dept: LAB | Facility: HOSPITAL | Age: 60
End: 2020-01-30
Attending: INTERNAL MEDICINE
Payer: MEDICAID

## 2020-01-30 VITALS
SYSTOLIC BLOOD PRESSURE: 153 MMHG | TEMPERATURE: 96 F | OXYGEN SATURATION: 100 % | BODY MASS INDEX: 19.66 KG/M2 | DIASTOLIC BLOOD PRESSURE: 81 MMHG | RESPIRATION RATE: 20 BRPM | WEIGHT: 137 LBS | HEART RATE: 55 BPM

## 2020-01-30 DIAGNOSIS — I10 ESSENTIAL HYPERTENSION: Chronic | ICD-10-CM

## 2020-01-30 DIAGNOSIS — G62.9 NEUROPATHY: ICD-10-CM

## 2020-01-30 DIAGNOSIS — R07.81 RIB PAIN ON LEFT SIDE: ICD-10-CM
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
## 2020-01-30 DIAGNOSIS — B20 AIDS (ACQUIRED IMMUNE DEFICIENCY SYNDROME): Chronic | ICD-10-CM

## 2020-01-30 LAB
ALBUMIN SERPL BCP-MCNC: 3.5 G/DL (ref 3.5–5.2)
ALP SERPL-CCNC: 120 U/L (ref 55–135)
ALT SERPL W/O P-5'-P-CCNC: 15 U/L (ref 10–44)
AMPHET+METHAMPHET UR QL: NEGATIVE
ANION GAP SERPL CALC-SCNC: 11 MMOL/L (ref 8–16)
AST SERPL-CCNC: 16 U/L (ref 10–40)
BARBITURATES UR QL SCN>200 NG/ML: NEGATIVE
BASOPHILS # BLD AUTO: 0.05 K/UL (ref 0–0.2)
BASOPHILS NFR BLD: 0.6 % (ref 0–1.9)
BENZODIAZ UR QL SCN>200 NG/ML: NEGATIVE
BILIRUB SERPL-MCNC: 0.3 MG/DL (ref 0.1–1)
BUN SERPL-MCNC: 18 MG/DL (ref 6–20)
BZE UR QL SCN: NEGATIVE
CALCIUM SERPL-MCNC: 9.6 MG/DL (ref 8.7–10.5)
CANNABINOIDS UR QL SCN: NEGATIVE
CHLORIDE SERPL-SCNC: 103 MMOL/L (ref 95–110)
CHOLEST SERPL-MCNC: 248 MG/DL (ref 120–199)
CHOLEST/HDLC SERPL: 4 {RATIO} (ref 2–5)
CO2 SERPL-SCNC: 26 MMOL/L (ref 23–29)
CREAT SERPL-MCNC: 1.3 MG/DL (ref 0.5–1.4)
CREAT UR-MCNC: 74 MG/DL (ref 23–375)
DIFFERENTIAL METHOD: ABNORMAL
EOSINOPHIL # BLD AUTO: 0.4 K/UL (ref 0–0.5)
EOSINOPHIL NFR BLD: 4.5 % (ref 0–8)
ERYTHROCYTE [DISTWIDTH] IN BLOOD BY AUTOMATED COUNT: 17.6 % (ref 11.5–14.5)
EST. GFR  (AFRICAN AMERICAN): >60 ML/MIN/1.73 M^2
EST. GFR  (NON AFRICAN AMERICAN): 60 ML/MIN/1.73 M^2
ETHANOL UR-MCNC: <10 MG/DL
GLUCOSE SERPL-MCNC: 91 MG/DL (ref 70–110)
HCT VFR BLD AUTO: 39.8 % (ref 40–54)
HDLC SERPL-MCNC: 62 MG/DL (ref 40–75)
HDLC SERPL: 25 % (ref 20–50)
HGB BLD-MCNC: 12.3 G/DL (ref 14–18)
IMM GRANULOCYTES # BLD AUTO: 0.19 K/UL (ref 0–0.04)
IMM GRANULOCYTES NFR BLD AUTO: 2.4 % (ref 0–0.5)
LDLC SERPL CALC-MCNC: 163 MG/DL (ref 63–159)
LYMPHOCYTES # BLD AUTO: 4.4 K/UL (ref 1–4.8)
LYMPHOCYTES NFR BLD: 56.8 % (ref 18–48)
MCH RBC QN AUTO: 28.4 PG (ref 27–31)
MCHC RBC AUTO-ENTMCNC: 30.9 G/DL (ref 32–36)
MCV RBC AUTO: 92 FL (ref 82–98)
METHADONE UR QL SCN>300 NG/ML: NEGATIVE
MONOCYTES # BLD AUTO: 0.6 K/UL (ref 0.3–1)
MONOCYTES NFR BLD: 7.2 % (ref 4–15)
NEUTROPHILS # BLD AUTO: 2.2 K/UL (ref 1.8–7.7)
NEUTROPHILS NFR BLD: 28.5 % (ref 38–73)
NONHDLC SERPL-MCNC: 186 MG/DL
NRBC BLD-RTO: 0 /100 WBC
OPIATES UR QL SCN: NEGATIVE
PCP UR QL SCN>25 NG/ML: NEGATIVE
PLATELET # BLD AUTO: 574 K/UL (ref 150–350)
PMV BLD AUTO: 9.4 FL (ref 9.2–12.9)
POTASSIUM SERPL-SCNC: 4 MMOL/L (ref 3.5–5.1)
PROT SERPL-MCNC: 9.1 G/DL (ref 6–8.4)
RBC # BLD AUTO: 4.33 M/UL (ref 4.6–6.2)
SODIUM SERPL-SCNC: 140 MMOL/L (ref 136–145)
TOXICOLOGY INFORMATION: NORMAL
TRIGL SERPL-MCNC: 115 MG/DL (ref 30–150)
WBC # BLD AUTO: 7.78 K/UL (ref 3.9–12.7)

## 2020-01-30 PROCEDURE — 80053 COMPREHEN METABOLIC PANEL: CPT

## 2020-01-30 PROCEDURE — 36415 COLL VENOUS BLD VENIPUNCTURE: CPT

## 2020-01-30 PROCEDURE — 80061 LIPID PANEL: CPT

## 2020-01-30 PROCEDURE — 86480 TB TEST CELL IMMUN MEASURE: CPT

## 2020-01-30 PROCEDURE — 25000003 PHARM REV CODE 250: Performed by: SURGERY

## 2020-01-30 PROCEDURE — 80307 DRUG TEST PRSMV CHEM ANLYZR: CPT

## 2020-01-30 PROCEDURE — 85025 COMPLETE CBC W/AUTO DIFF WBC: CPT

## 2020-01-30 PROCEDURE — 86803 HEPATITIS C AB TEST: CPT

## 2020-01-30 PROCEDURE — 99283 EMERGENCY DEPT VISIT LOW MDM: CPT | Mod: 25

## 2020-01-30 PROCEDURE — 87536 HIV-1 QUANT&REVRSE TRNSCRPJ: CPT

## 2020-01-30 PROCEDURE — 86592 SYPHILIS TEST NON-TREP QUAL: CPT

## 2020-01-30 PROCEDURE — 86361 T CELL ABSOLUTE COUNT: CPT

## 2020-01-30 RX ORDER — ACETAMINOPHEN 500 MG
1000 TABLET ORAL
Status: COMPLETED | OUTPATIENT
Start: 2020-01-30 | End: 2020-01-30

## 2020-01-30 RX ORDER — CYCLOBENZAPRINE HCL 10 MG
10 TABLET ORAL 3 TIMES DAILY PRN
Qty: 10 TABLET | Refills: 0 | Status: SHIPPED | OUTPATIENT
Start: 2020-01-30 | End: 2020-02-04

## 2020-01-30 RX ORDER — IBUPROFEN 800 MG/1
800 TABLET ORAL EVERY 6 HOURS PRN
Qty: 20 TABLET | Refills: 0 | Status: SHIPPED | OUTPATIENT
Start: 2020-01-30 | End: 2020-01-30 | Stop reason: ALTCHOICE

## 2020-01-30 RX ORDER — IBUPROFEN 800 MG/1
800 TABLET ORAL
Status: COMPLETED | OUTPATIENT
Start: 2020-01-30 | End: 2020-01-30

## 2020-01-30 RX ADMIN — IBUPROFEN 800 MG: 800 TABLET ORAL at 09:01

## 2020-01-30 RX ADMIN — ACETAMINOPHEN 1000 MG: 500 TABLET, FILM COATED ORAL at 09:01
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
## 2020-01-31 LAB
CD3+CD4+ CELLS # BLD: 163 CELLS/UL (ref 300–1400)
CD3+CD4+ CELLS NFR BLD: 3.1 % (ref 28–57)
HCV AB SERPL QL IA: NEGATIVE
RPR SER QL: NORMAL

## 2020-02-03 LAB
GAMMA INTERFERON BACKGROUND BLD IA-ACNC: 0.03 IU/ML
HIV UQ DATE RECEIVED: ABNORMAL
HIV UQ DATE REPORTED: ABNORMAL
HIV1 RNA # SERPL NAA+PROBE: 178 COPIES/ML
HIV1 RNA SERPL NAA+PROBE-LOG#: 2.25 LOG (10) COPIES/ML
HIV1 RNA SERPL QL NAA+PROBE: DETECTED
M TB IFN-G CD4+ BCKGRND COR BLD-ACNC: 0 IU/ML
MITOGEN IGNF BCKGRD COR BLD-ACNC: 7.22 IU/ML
TB GOLD PLUS: NEGATIVE
TB2 - NIL: 0 IU/ML

## 2020-05-13 ENCOUNTER — HOSPITAL ENCOUNTER (EMERGENCY)
Facility: HOSPITAL | Age: 60
Discharge: HOME | End: 2020-05-13
Attending: EMERGENCY MEDICINE
Payer: MEDICAID

## 2020-05-13 VITALS
HEART RATE: 68 BPM | OXYGEN SATURATION: 100 % | SYSTOLIC BLOOD PRESSURE: 211 MMHG | DIASTOLIC BLOOD PRESSURE: 107 MMHG | WEIGHT: 180 LBS | BODY MASS INDEX: 26.66 KG/M2 | HEIGHT: 69 IN | TEMPERATURE: 99 F

## 2020-05-13 DIAGNOSIS — R21 RASH: Primary | ICD-10-CM

## 2020-05-13 PROCEDURE — 99284 EMERGENCY DEPT VISIT MOD MDM: CPT

## 2020-05-13 PROCEDURE — 63600175 PHARM REV CODE 636 W HCPCS: Performed by: EMERGENCY MEDICINE

## 2020-05-13 PROCEDURE — 25000003 PHARM REV CODE 250: Performed by: EMERGENCY MEDICINE

## 2020-05-13 RX ORDER — PREDNISONE 20 MG/1
40 TABLET ORAL DAILY
Qty: 10 TABLET | Refills: 0 | Status: ON HOLD | OUTPATIENT
Start: 2020-05-13 | End: 2020-05-23 | Stop reason: HOSPADM

## 2020-05-13 RX ORDER — DIPHENHYDRAMINE HCL 25 MG
25 CAPSULE ORAL EVERY 6 HOURS PRN
Qty: 20 CAPSULE | Refills: 0 | Status: ON HOLD | OUTPATIENT
Start: 2020-05-13 | End: 2021-05-05 | Stop reason: HOSPADM

## 2020-05-13 RX ORDER — PREDNISONE 20 MG/1
60 TABLET ORAL
Status: COMPLETED | OUTPATIENT
Start: 2020-05-13 | End: 2020-05-13

## 2020-05-13 RX ORDER — DIPHENHYDRAMINE HCL 25 MG
25 CAPSULE ORAL
Status: COMPLETED | OUTPATIENT
Start: 2020-05-13 | End: 2020-05-13

## 2020-05-13 RX ORDER — FAMOTIDINE 20 MG/1
20 TABLET, FILM COATED ORAL
Status: COMPLETED | OUTPATIENT
Start: 2020-05-13 | End: 2020-05-13

## 2020-05-13 RX ADMIN — PREDNISONE 60 MG: 20 TABLET ORAL at 05:05

## 2020-05-13 RX ADMIN — FAMOTIDINE 20 MG: 20 TABLET ORAL at 05:05

## 2020-05-13 RX ADMIN — DIPHENHYDRAMINE HYDROCHLORIDE 25 MG: 25 CAPSULE ORAL at 05:05

## 2020-05-13 NOTE — ED TRIAGE NOTES
"Pt states he noticed a "red itchy bumpy" area on his R arm that appeared after he finished working on his car. Pt states the area is tender to touch. Pt states he doesn't know if he got bit by anything or touched anything unusual. No SOB at this time. Will cont to monitor.  "

## 2020-05-13 NOTE — ED NOTES
Pt states he did not take his BP meds on today. Dr. Sutherland notified about pts elevated BP. Pt educated on the importance of keeping up with his BP medication regimen.

## 2020-05-13 NOTE — ED PROVIDER NOTES
Encounter Date: 5/13/2020    SCRIBE #1 NOTE: I, Melvina Mathis, am scribing for, and in the presence of,  Dr. Sutherland. I have scribed the entire note.       History     Chief Complaint   Patient presents with    Rash     Raised rash to R posterior/anterior forearm pt doesnt recall being bit by anything or coming in contact with anything unsual       Time seen by provider: 4:59 AM on 05/13/2020    The patient is a 59 y.o. male who presents to the ED with complaint of rash to his right forearm which onset gradually since 1:30 AM. Symptoms are worsening in severity. Associated sxs include itching and tightness to his forearm. Patient denies any difficulty breathing, nausea, vomiting, diarrhea, fever, and all other sxs at this time. No prior Tx. Patient reports he was looking for the Prisync for his cra when his symptoms began. Patient denies any new lotions, unusual consumption, or contact with anything unusual.     has a past medical history of Arthritis, Back pain, Chronic kidney disease, stage 3, COPD (chronic obstructive pulmonary disease), Esophageal reflux, HIV (human immunodeficiency virus infection), Hypertension, Lumbago, Psoriasis, Skin disorder, and Tobacco use.  has a past surgical history that includes Finger surgery and Tonsillectomy.    The history is provided by the patient.     Review of patient's allergies indicates:   Allergen Reactions    Penicillins Hives    Nsaids (non-steroidal anti-inflammatory drug)      CKD    Sustiva [efavirenz] Other (See Comments)     insomnia     Past Medical History:   Diagnosis Date    Arthritis     Back pain     Chronic kidney disease, stage 3     COPD (chronic obstructive pulmonary disease)     Esophageal reflux     HIV (human immunodeficiency virus infection)     2006    Hypertension     Lumbago     Psoriasis     Skin disorder     Tobacco use      Past Surgical History:   Procedure Laterality Date    FINGER SURGERY      TONSILLECTOMY       Family  History   Problem Relation Age of Onset    Arthritis Mother     Hypertension Mother     Hypertension Sister     Arthritis Sister     No Known Problems Father      Social History     Tobacco Use    Smoking status: Current Every Day Smoker     Packs/day: 0.25     Years: 40.00     Pack years: 10.00     Types: Cigarettes    Smokeless tobacco: Never Used   Substance Use Topics    Alcohol use: Yes     Alcohol/week: 0.0 standard drinks     Comment: occassional beer; pt drank beer this am    Drug use: Yes     Types: Marijuana     Comment: occasional use of marijuana     Review of Systems   Constitutional: Negative for chills and fever.   HENT: Negative for sore throat.    Eyes: Negative for redness.   Respiratory: Negative for shortness of breath.    Cardiovascular: Negative for chest pain.   Gastrointestinal: Negative for abdominal pain, diarrhea, nausea and vomiting.   Genitourinary: Negative for dysuria and hematuria.   Musculoskeletal: Negative for back pain.   Skin: Positive for rash.   Neurological: Negative for headaches.       Physical Exam     Initial Vitals   BP Pulse Resp Temp SpO2   05/13/20 0455 05/13/20 0455 -- 05/13/20 0453 05/13/20 0455   (!) 202/120 64  97.8 °F (36.6 °C) 100 %      MAP       --                Physical Exam    Nursing note and vitals reviewed.  Constitutional: He appears well-developed and well-nourished.   HENT:   Head: Normocephalic and atraumatic.   Eyes: Conjunctivae, EOM and lids are normal. Pupils are equal, round, and reactive to light.   Neck: Trachea normal, normal range of motion and full passive range of motion without pain.   Pulmonary/Chest:   No respiratory distress   Abdominal: Soft. Normal appearance and bowel sounds are normal.   Neurological: He is alert.   Skin: Skin is intact. Rash noted.   PE noted urticarial rash circumferentially around right arm.   Psychiatric: He has a normal mood and affect. His speech is normal and behavior is normal. Judgment and thought  content normal.         ED Course   Procedures  Labs Reviewed - No data to display       Imaging Results    None          Medical Decision Making:   History:   Old Medical Records: I decided to obtain old medical records.  Initial Assessment:   59 y.o. male with Arthritis, COPD, HTN, HIV, and skin disorder presents to the ED with complaint of rash to his right forearm which onset gradually since 1:30 AM. /120. PE noted urticarial rash circumferentially around right arm.  Differential Diagnosis:   Ddx includes but is not limited to:  Allergic reaction  ED Management:  Plan: Pepcid, Benadryl, and steroids                   ED Course as of May 14 2044   Wed May 13, 2020   0551 Patient well-appearing.  Noted BP patient mentions seeing has not taking BP meds today denies any chest pain, shortness of breath, numbness tingling weakness.  Rash on arms stable no worsening urticarial rashes will DC home with medications and follow-up instructions.  Patient has no further questions.  Safe for planned discharge at this time.    [DC]      ED Course User Index  [DC] Kasie Sutherland Jr., MD                Clinical Impression:       ICD-10-CM ICD-9-CM   1. Rash R21 782.1         Disposition:   Disposition: Discharged  Condition: Stable     ED Disposition Condition    Discharge Stable             I, Kasie Sutherland,  personally performed the services described in this documentation. All medical record entries made by the scribe were at my direction and in my presence.  I have reviewed the chart and agree that the record reflects my personal performance and is accurate and complete. Kasie Sutherland M.D       ED Prescriptions     Medication Sig Dispense Start Date End Date Auth. Provider    diphenhydrAMINE (BENADRYL) 25 mg capsule Take 1 capsule (25 mg total) by mouth every 6 (six) hours as needed for Itching or Allergies. 20 capsule 5/13/2020  Kasie Sutherland Jr., MD    predniSONE (DELTASONE) 20 MG tablet Take 2 tablets (40  mg total) by mouth once daily. for 5 days 10 tablet 5/13/2020 5/18/2020 Kasie Sutherland Jr., MD        Follow-up Information     Follow up With Specialties Details Why Contact Info    Marisol Tejada MD Infectious Diseases, Obstetrics and Gynecology   [address]  [place] [address]  [phone]      [facility]  As needed [address] [id number] [phone]                                     Kasie Sutherland Jr., MD  05/14/20 2041       Kasie Sutherland Jr., MD  06/06/20 3660

## 2020-05-19 ENCOUNTER — HOSPITAL ENCOUNTER (INPATIENT)
Facility: HOSPITAL | Age: 60
LOS: 4 days | Discharge: HOME | DRG: 603 | End: 2020-05-23
Attending: EMERGENCY MEDICINE | Admitting: EMERGENCY MEDICINE
Payer: MEDICAID

## 2020-05-19 DIAGNOSIS — L03.116 CELLULITIS OF LEFT LOWER EXTREMITY: Primary | ICD-10-CM

## 2020-05-19 DIAGNOSIS — B20 AIDS (ACQUIRED IMMUNE DEFICIENCY SYNDROME): Chronic | ICD-10-CM

## 2020-05-19 DIAGNOSIS — M79.89 PAIN AND SWELLING OF LEFT LOWER EXTREMITY: ICD-10-CM

## 2020-05-19 DIAGNOSIS — M25.472 LEFT ANKLE SWELLING: ICD-10-CM

## 2020-05-19 DIAGNOSIS — M79.89 LEFT LEG SWELLING: ICD-10-CM

## 2020-05-19 DIAGNOSIS — M79.605 PAIN AND SWELLING OF LEFT LOWER EXTREMITY: ICD-10-CM

## 2020-05-19 DIAGNOSIS — N17.9 AKI (ACUTE KIDNEY INJURY): ICD-10-CM

## 2020-05-19 LAB
ALBUMIN SERPL BCP-MCNC: 2.4 G/DL (ref 3.5–5.2)
ALP SERPL-CCNC: 87 U/L (ref 55–135)
ALT SERPL W/O P-5'-P-CCNC: 22 U/L (ref 10–44)
ANION GAP SERPL CALC-SCNC: 11 MMOL/L (ref 8–16)
AST SERPL-CCNC: 46 U/L (ref 10–40)
BASOPHILS # BLD AUTO: 0.02 K/UL (ref 0–0.2)
BASOPHILS NFR BLD: 0.4 % (ref 0–1.9)
BILIRUB SERPL-MCNC: 1.9 MG/DL (ref 0.1–1)
BUN SERPL-MCNC: 57 MG/DL (ref 6–20)
CALCIUM SERPL-MCNC: 8.5 MG/DL (ref 8.7–10.5)
CHLORIDE SERPL-SCNC: 101 MMOL/L (ref 95–110)
CO2 SERPL-SCNC: 23 MMOL/L (ref 23–29)
CREAT SERPL-MCNC: 2.6 MG/DL (ref 0.5–1.4)
CRP SERPL-MCNC: 241.3 MG/L (ref 0–8.2)
DIFFERENTIAL METHOD: ABNORMAL
EOSINOPHIL # BLD AUTO: 0.1 K/UL (ref 0–0.5)
EOSINOPHIL NFR BLD: 1 % (ref 0–8)
ERYTHROCYTE [DISTWIDTH] IN BLOOD BY AUTOMATED COUNT: 13.2 % (ref 11.5–14.5)
ERYTHROCYTE [SEDIMENTATION RATE] IN BLOOD BY WESTERGREN METHOD: 89 MM/HR (ref 0–23)
EST. GFR  (AFRICAN AMERICAN): 29.9 ML/MIN/1.73 M^2
EST. GFR  (NON AFRICAN AMERICAN): 25.8 ML/MIN/1.73 M^2
GLUCOSE SERPL-MCNC: 101 MG/DL (ref 70–110)
HCT VFR BLD AUTO: 41.5 % (ref 40–54)
HGB BLD-MCNC: 13.5 G/DL (ref 14–18)
IMM GRANULOCYTES # BLD AUTO: 0.12 K/UL (ref 0–0.04)
IMM GRANULOCYTES NFR BLD AUTO: 2.5 % (ref 0–0.5)
LACTATE SERPL-SCNC: 1.5 MMOL/L (ref 0.5–2.2)
LYMPHOCYTES # BLD AUTO: 0.7 K/UL (ref 1–4.8)
LYMPHOCYTES NFR BLD: 13.8 % (ref 18–48)
MCH RBC QN AUTO: 29 PG (ref 27–31)
MCHC RBC AUTO-ENTMCNC: 32.5 G/DL (ref 32–36)
MCV RBC AUTO: 89 FL (ref 82–98)
MONOCYTES # BLD AUTO: 0.3 K/UL (ref 0.3–1)
MONOCYTES NFR BLD: 6.5 % (ref 4–15)
NEUTROPHILS # BLD AUTO: 3.6 K/UL (ref 1.8–7.7)
NEUTROPHILS NFR BLD: 75.8 % (ref 38–73)
NRBC BLD-RTO: 0 /100 WBC
PLATELET # BLD AUTO: 171 K/UL (ref 150–350)
PMV BLD AUTO: 11.5 FL (ref 9.2–12.9)
POTASSIUM SERPL-SCNC: 3.5 MMOL/L (ref 3.5–5.1)
PROT SERPL-MCNC: 8 G/DL (ref 6–8.4)
RBC # BLD AUTO: 4.66 M/UL (ref 4.6–6.2)
SARS-COV-2 RDRP RESP QL NAA+PROBE: NEGATIVE
SODIUM SERPL-SCNC: 135 MMOL/L (ref 136–145)
URATE SERPL-MCNC: 10.3 MG/DL (ref 3.4–7)
WBC # BLD AUTO: 4.78 K/UL (ref 3.9–12.7)

## 2020-05-19 PROCEDURE — 99223 PR INITIAL HOSPITAL CARE,LEVL III: ICD-10-PCS | Mod: ,,, | Performed by: HOSPITALIST

## 2020-05-19 PROCEDURE — 87040 BLOOD CULTURE FOR BACTERIA: CPT

## 2020-05-19 PROCEDURE — 25000003 PHARM REV CODE 250: Performed by: EMERGENCY MEDICINE

## 2020-05-19 PROCEDURE — 99285 PR EMERGENCY DEPT VISIT,LEVEL V: ICD-10-PCS | Mod: ,,, | Performed by: EMERGENCY MEDICINE

## 2020-05-19 PROCEDURE — 99285 EMERGENCY DEPT VISIT HI MDM: CPT | Mod: ,,, | Performed by: EMERGENCY MEDICINE

## 2020-05-19 PROCEDURE — 83605 ASSAY OF LACTIC ACID: CPT

## 2020-05-19 PROCEDURE — 25000003 PHARM REV CODE 250: Performed by: STUDENT IN AN ORGANIZED HEALTH CARE EDUCATION/TRAINING PROGRAM

## 2020-05-19 PROCEDURE — 63600175 PHARM REV CODE 636 W HCPCS: Performed by: STUDENT IN AN ORGANIZED HEALTH CARE EDUCATION/TRAINING PROGRAM

## 2020-05-19 PROCEDURE — 87186 SC STD MICRODIL/AGAR DIL: CPT

## 2020-05-19 PROCEDURE — 85652 RBC SED RATE AUTOMATED: CPT

## 2020-05-19 PROCEDURE — 84550 ASSAY OF BLOOD/URIC ACID: CPT

## 2020-05-19 PROCEDURE — 96365 THER/PROPH/DIAG IV INF INIT: CPT

## 2020-05-19 PROCEDURE — 80053 COMPREHEN METABOLIC PANEL: CPT

## 2020-05-19 PROCEDURE — 85025 COMPLETE CBC W/AUTO DIFF WBC: CPT

## 2020-05-19 PROCEDURE — 99285 EMERGENCY DEPT VISIT HI MDM: CPT | Mod: 25

## 2020-05-19 PROCEDURE — 86140 C-REACTIVE PROTEIN: CPT

## 2020-05-19 PROCEDURE — 99223 1ST HOSP IP/OBS HIGH 75: CPT | Mod: ,,, | Performed by: HOSPITALIST

## 2020-05-19 PROCEDURE — 11000001 HC ACUTE MED/SURG PRIVATE ROOM

## 2020-05-19 PROCEDURE — U0002 COVID-19 LAB TEST NON-CDC: HCPCS

## 2020-05-19 RX ORDER — HEPARIN SODIUM 5000 [USP'U]/ML
5000 INJECTION, SOLUTION INTRAVENOUS; SUBCUTANEOUS EVERY 8 HOURS
Status: DISCONTINUED | OUTPATIENT
Start: 2020-05-19 | End: 2020-05-23 | Stop reason: HOSPADM

## 2020-05-19 RX ORDER — NIFEDIPINE 30 MG/1
90 TABLET, EXTENDED RELEASE ORAL DAILY
Status: DISCONTINUED | OUTPATIENT
Start: 2020-05-20 | End: 2020-05-19

## 2020-05-19 RX ORDER — CLINDAMYCIN PHOSPHATE 600 MG/50ML
600 INJECTION, SOLUTION INTRAVENOUS
Status: DISCONTINUED | OUTPATIENT
Start: 2020-05-19 | End: 2020-05-20

## 2020-05-19 RX ORDER — IBUPROFEN 200 MG
16 TABLET ORAL
Status: DISCONTINUED | OUTPATIENT
Start: 2020-05-19 | End: 2020-05-23 | Stop reason: HOSPADM

## 2020-05-19 RX ORDER — METOPROLOL SUCCINATE 50 MG/1
50 TABLET, EXTENDED RELEASE ORAL DAILY
Status: DISCONTINUED | OUTPATIENT
Start: 2020-05-19 | End: 2020-05-23 | Stop reason: HOSPADM

## 2020-05-19 RX ORDER — DAPSONE 100 MG/1
100 TABLET ORAL DAILY
Status: DISCONTINUED | OUTPATIENT
Start: 2020-05-20 | End: 2020-05-19

## 2020-05-19 RX ORDER — ONDANSETRON 8 MG/1
8 TABLET, ORALLY DISINTEGRATING ORAL EVERY 6 HOURS PRN
Status: DISCONTINUED | OUTPATIENT
Start: 2020-05-19 | End: 2020-05-23 | Stop reason: HOSPADM

## 2020-05-19 RX ORDER — ASPIRIN 81 MG/1
81 TABLET ORAL DAILY
Status: DISCONTINUED | OUTPATIENT
Start: 2020-05-20 | End: 2020-05-23 | Stop reason: HOSPADM

## 2020-05-19 RX ORDER — FERROUS SULFATE 325(65) MG
325 TABLET, DELAYED RELEASE (ENTERIC COATED) ORAL DAILY
Status: DISCONTINUED | OUTPATIENT
Start: 2020-05-20 | End: 2020-05-23 | Stop reason: HOSPADM

## 2020-05-19 RX ORDER — METOPROLOL SUCCINATE 50 MG/1
50 TABLET, EXTENDED RELEASE ORAL DAILY
Status: DISCONTINUED | OUTPATIENT
Start: 2020-05-20 | End: 2020-05-19

## 2020-05-19 RX ORDER — SODIUM CHLORIDE 0.9 % (FLUSH) 0.9 %
10 SYRINGE (ML) INJECTION
Status: CANCELLED | OUTPATIENT
Start: 2020-05-19

## 2020-05-19 RX ORDER — IBUPROFEN 200 MG
24 TABLET ORAL
Status: DISCONTINUED | OUTPATIENT
Start: 2020-05-19 | End: 2020-05-23 | Stop reason: HOSPADM

## 2020-05-19 RX ORDER — NIFEDIPINE 30 MG/1
90 TABLET, EXTENDED RELEASE ORAL DAILY
Status: DISCONTINUED | OUTPATIENT
Start: 2020-05-19 | End: 2020-05-23 | Stop reason: HOSPADM

## 2020-05-19 RX ORDER — METHOTREXATE 2.5 MG/1
10 TABLET ORAL
Status: DISCONTINUED | OUTPATIENT
Start: 2020-05-20 | End: 2020-05-19

## 2020-05-19 RX ORDER — FOLIC ACID 1 MG/1
1 TABLET ORAL DAILY
Status: DISCONTINUED | OUTPATIENT
Start: 2020-05-20 | End: 2020-05-23 | Stop reason: HOSPADM

## 2020-05-19 RX ORDER — CLINDAMYCIN PHOSPHATE 600 MG/50ML
600 INJECTION, SOLUTION INTRAVENOUS
Status: COMPLETED | OUTPATIENT
Start: 2020-05-19 | End: 2020-05-19

## 2020-05-19 RX ORDER — BETAMETHASONE VALERATE 1.2 MG/G
CREAM TOPICAL DAILY
Status: DISCONTINUED | OUTPATIENT
Start: 2020-05-20 | End: 2020-05-19

## 2020-05-19 RX ORDER — PROMETHAZINE HYDROCHLORIDE 12.5 MG/1
12.5 TABLET ORAL EVERY 6 HOURS PRN
Status: DISCONTINUED | OUTPATIENT
Start: 2020-05-19 | End: 2020-05-23 | Stop reason: HOSPADM

## 2020-05-19 RX ORDER — ALBUTEROL SULFATE 2.5 MG/.5ML
2.5 SOLUTION RESPIRATORY (INHALATION) EVERY 4 HOURS PRN
Status: DISCONTINUED | OUTPATIENT
Start: 2020-05-19 | End: 2020-05-23 | Stop reason: HOSPADM

## 2020-05-19 RX ORDER — SODIUM CHLORIDE 0.9 % (FLUSH) 0.9 %
10 SYRINGE (ML) INJECTION
Status: DISCONTINUED | OUTPATIENT
Start: 2020-05-19 | End: 2020-05-23 | Stop reason: HOSPADM

## 2020-05-19 RX ORDER — HYDROCHLOROTHIAZIDE 25 MG/1
25 TABLET ORAL DAILY
Status: DISCONTINUED | OUTPATIENT
Start: 2020-05-20 | End: 2020-05-19

## 2020-05-19 RX ORDER — ACETAMINOPHEN 325 MG/1
650 TABLET ORAL EVERY 4 HOURS PRN
Status: DISCONTINUED | OUTPATIENT
Start: 2020-05-19 | End: 2020-05-23 | Stop reason: HOSPADM

## 2020-05-19 RX ORDER — GLUCAGON 1 MG
1 KIT INJECTION
Status: DISCONTINUED | OUTPATIENT
Start: 2020-05-19 | End: 2020-05-23 | Stop reason: HOSPADM

## 2020-05-19 RX ORDER — DAPSONE 100 MG/1
100 TABLET ORAL DAILY
Status: DISCONTINUED | OUTPATIENT
Start: 2020-05-20 | End: 2020-05-23 | Stop reason: HOSPADM

## 2020-05-19 RX ORDER — PRAVASTATIN SODIUM 10 MG/1
40 TABLET ORAL NIGHTLY
Status: DISCONTINUED | OUTPATIENT
Start: 2020-05-19 | End: 2020-05-23 | Stop reason: HOSPADM

## 2020-05-19 RX ORDER — SULFAMETHOXAZOLE AND TRIMETHOPRIM 400; 80 MG/1; MG/1
1 TABLET ORAL DAILY
Status: DISCONTINUED | OUTPATIENT
Start: 2020-05-20 | End: 2020-05-19

## 2020-05-19 RX ADMIN — HEPARIN SODIUM 5000 UNITS: 5000 INJECTION INTRAVENOUS; SUBCUTANEOUS at 09:05

## 2020-05-19 RX ADMIN — CLINDAMYCIN IN 5 PERCENT DEXTROSE 600 MG: 12 INJECTION, SOLUTION INTRAVENOUS at 03:05

## 2020-05-19 RX ADMIN — CLINDAMYCIN IN 5 PERCENT DEXTROSE 600 MG: 12 INJECTION, SOLUTION INTRAVENOUS at 11:05

## 2020-05-19 RX ADMIN — SODIUM CHLORIDE 1000 ML: 0.9 INJECTION, SOLUTION INTRAVENOUS at 08:05

## 2020-05-19 RX ADMIN — PRAVASTATIN SODIUM 40 MG: 10 TABLET ORAL at 09:05

## 2020-05-19 RX ADMIN — METOPROLOL SUCCINATE 50 MG: 50 TABLET, EXTENDED RELEASE ORAL at 07:05

## 2020-05-19 RX ADMIN — NIFEDIPINE 90 MG: 30 TABLET, FILM COATED, EXTENDED RELEASE ORAL at 09:05

## 2020-05-19 NOTE — ED NOTES
Pt identifiers Andrew Velazquez were checked and are correct  LOC: The patient is awake, alert, aware of environment with an appropriate affect. Oriented x4, speaking appropriately  APPEARANCE: Pt resting comfortably, in no acute distress, pt is clean and well groomed, clothing properly fastened  SKIN: Skin warm, dry and intact, normal skin turgor, moist mucus membranes  Redness and swelling noted to left foot   RESPIRATORY: Airway is open and patent, respirations are spontaneous, even and unlabored, normal effort and rate  CARDIAC: Normal rate and rhythm, 2 + peripheral edema noted, capillary refill < 3 seconds, bilateral radial pulses 2+  ABDOMEN: Soft, nontender, nondistended.   NEUROLOGIC: facial expression is symmetrical, patient moving all extremities spontaneously, normal sensation in all extremities when touched with a finger.  Follows all commands appropriately  MUSCULOSKELETAL: swelling noted to left lower leg and foot

## 2020-05-19 NOTE — ED TRIAGE NOTES
Pt complains of generalized weakness, swelling to legs , decreased appetite for approx. 6 days and weight loss over past two weeks

## 2020-05-19 NOTE — ED PROVIDER NOTES
Encounter Date: 5/19/2020       History     Chief Complaint   Patient presents with    Fatigue     arrived via  EMS with c/o left ankle pain and swelling, also c/o generalized weakness and decreased appetite x6 days     60 yo m, h/o AIDS (CD4 40s), COPD, HTN, polysubstance abuse, CKD, c/o left leg pain x 2 weeks, worsening, ax with increasing swelling.  Pain is worst in foot/ankle region.  No trauma.  Pt denies a h/o gout.  Low grade fever here but denied fever at home.  No URI sx.    The history is provided by the patient.     Review of patient's allergies indicates:   Allergen Reactions    Penicillins Hives    Nsaids (non-steroidal anti-inflammatory drug)      CKD    Sustiva [efavirenz] Other (See Comments)     insomnia     Past Medical History:   Diagnosis Date    Arthritis     Back pain     Chronic kidney disease, stage 3     COPD (chronic obstructive pulmonary disease)     Esophageal reflux     HIV (human immunodeficiency virus infection)     2006    Hypertension     Lumbago     Psoriasis     Skin disorder     Tobacco use      Past Surgical History:   Procedure Laterality Date    FINGER SURGERY      TONSILLECTOMY       Family History   Problem Relation Age of Onset    Arthritis Mother     Hypertension Mother     Hypertension Sister     Arthritis Sister     No Known Problems Father      Social History     Tobacco Use    Smoking status: Current Every Day Smoker     Packs/day: 0.25     Years: 40.00     Pack years: 10.00     Types: Cigarettes    Smokeless tobacco: Never Used   Substance Use Topics    Alcohol use: Yes     Alcohol/week: 0.0 standard drinks     Comment: occassional beer; pt drank beer this am    Drug use: Yes     Types: Marijuana     Comment: occasional use of marijuana     Review of Systems   Constitutional: Negative for chills, diaphoresis, fatigue and fever.   Respiratory: Negative for cough and shortness of breath.    Cardiovascular: Positive for leg swelling. Negative  for chest pain and palpitations.   Gastrointestinal: Negative for abdominal pain.   Musculoskeletal: Positive for arthralgias.   All other systems reviewed and are negative.      Physical Exam     Initial Vitals [05/19/20 1419]   BP Pulse Resp Temp SpO2   (!) 150/74 77 18 99.4 °F (37.4 °C) 97 %      MAP       --         Physical Exam    Nursing note and vitals reviewed.  Constitutional: Vital signs are normal. He appears well-developed and well-nourished. He is not diaphoretic.  Non-toxic appearance. He does not appear ill. No distress.   HENT:   Head: Normocephalic and atraumatic.   Mouth/Throat: Uvula is midline, oropharynx is clear and moist and mucous membranes are normal.   Eyes: Conjunctivae and lids are normal.   Neck: Normal range of motion. Neck supple.   Cardiovascular: Normal rate, regular rhythm, S1 normal and S2 normal.   No murmur heard.  Pulmonary/Chest: No respiratory distress.   Abdominal: Soft. Normal appearance. He exhibits no ascites and no mass. There is no tenderness.   Musculoskeletal:   L LE: erythema/swelling/warmth to diffuse foot, extending up lower leg.  Moderate tenderness over leg and ankle.  Skin dry and scaly.  DP pulses 2+ B   Neurological: He is alert and oriented to person, place, and time. He has normal strength. No cranial nerve deficit.   Skin: Skin is warm, dry and intact. No rash noted. No cyanosis. No pallor.   Psychiatric: He has a normal mood and affect. His speech is normal and behavior is normal. He is not actively hallucinating. He is attentive.             ED Course   Procedures  Labs Reviewed   CBC W/ AUTO DIFFERENTIAL - Abnormal; Notable for the following components:       Result Value    Hemoglobin 13.5 (*)     Immature Granulocytes 2.5 (*)     Immature Grans (Abs) 0.12 (*)     Lymph # 0.7 (*)     Gran% 75.8 (*)     Lymph% 13.8 (*)     All other components within normal limits   COMPREHENSIVE METABOLIC PANEL - Abnormal; Notable for the following components:    Sodium  135 (*)     BUN, Bld 57 (*)     Creatinine 2.6 (*)     Calcium 8.5 (*)     Albumin 2.4 (*)     Total Bilirubin 1.9 (*)     AST 46 (*)     eGFR if  29.9 (*)     eGFR if non  25.8 (*)     All other components within normal limits   SEDIMENTATION RATE - Abnormal; Notable for the following components:    Sed Rate 89 (*)     All other components within normal limits   C-REACTIVE PROTEIN - Abnormal; Notable for the following components:    .3 (*)     All other components within normal limits   URIC ACID - Abnormal; Notable for the following components:    Uric Acid 10.3 (*)     All other components within normal limits   CULTURE, BLOOD   CULTURE, BLOOD   SARS-COV-2 RNA AMPLIFICATION, QUAL   LACTIC ACID, PLASMA          Imaging Results          US Lower Extremity Veins Bilateral (Final result)  Result time 05/19/20 17:16:03    Final result by Jonas Renteria MD (05/19/20 17:16:03)                 Impression:      No evidence of deep venous thrombosis in either lower extremity.    Electronically signed by resident: Jonathan Clayton  Date:    05/19/2020  Time:    17:06    Electronically signed by: Jonas Renteria MD  Date:    05/19/2020  Time:    17:16             Narrative:    EXAMINATION:  US LOWER EXTREMITY VEINS BILATERAL    CLINICAL HISTORY:  Other specified soft tissue disorders    TECHNIQUE:  Duplex and color flow Doppler and dynamic compression was performed of the bilateral lower extremity veins was performed.    COMPARISON:  Right lower extremity venous ultrasound 01/12/2020, left lower extremity venous ultrasound 09/05/2014    FINDINGS:  Right thigh veins: The common femoral, femoral, popliteal, upper greater saphenous, and deep femoral veins are patent and free of thrombus. The veins are normally compressible and have normal phasic flow and augmentation response.    Right calf veins: The visualized calf veins are patent.    Left thigh veins: The common femoral, femoral, popliteal,  upper greater saphenous, and deep femoral veins are patent and free of thrombus. The veins are normally compressible and have normal phasic flow and augmentation response.    Left calf veins: The visualized calf veins are patent.    Miscellaneous: Mild subcutaneous edema.                                 Medical Decision Making:   History:   Old Medical Records: I decided to obtain old medical records.  Initial Assessment:   60 yo m, complex PMH as above, now here with left leg/ankle/foot pain/swelling/erythema with low grade fever    On exam, well-appearing but with significant swelling to leg  Differential Diagnosis:   Concern for L LE cellulitis vs gout/arthritis vs DVT vs chronic venous stasis  Clinical Tests:   Lab Tests: Ordered and Reviewed  Radiological Study: Ordered and Reviewed  Medical Tests: Ordered and Reviewed  ED Management:  Labs  IV clinda  Anticipate admission given severe immunocompromised state                                 Clinical Impression:       ICD-10-CM ICD-9-CM   1. Cellulitis of left lower extremity L03.116 682.6   2. Left leg swelling M79.89 729.81   3. LISA (acute kidney injury) N17.9 584.9             ED Disposition Condition    Observation                           Dora Farris MD  05/19/20 5006

## 2020-05-20 PROBLEM — M25.472 LEFT ANKLE SWELLING: Status: RESOLVED | Noted: 2020-05-20 | Resolved: 2020-05-20

## 2020-05-20 PROBLEM — M25.472 LEFT ANKLE SWELLING: Status: ACTIVE | Noted: 2020-05-20

## 2020-05-20 PROBLEM — M79.605 PAIN AND SWELLING OF LEFT LOWER EXTREMITY: Status: ACTIVE | Noted: 2020-05-19

## 2020-05-20 PROBLEM — M79.89 PAIN AND SWELLING OF LEFT LOWER EXTREMITY: Status: ACTIVE | Noted: 2020-05-19

## 2020-05-20 LAB
ANION GAP SERPL CALC-SCNC: 11 MMOL/L (ref 8–16)
BACTERIA #/AREA URNS AUTO: ABNORMAL /HPF
BASOPHILS # BLD AUTO: 0.02 K/UL (ref 0–0.2)
BASOPHILS NFR BLD: 0.3 % (ref 0–1.9)
BILIRUB UR QL STRIP: NEGATIVE
BUN SERPL-MCNC: 52 MG/DL (ref 6–20)
CALCIUM SERPL-MCNC: 7.8 MG/DL (ref 8.7–10.5)
CHLORIDE SERPL-SCNC: 102 MMOL/L (ref 95–110)
CLARITY UR REFRACT.AUTO: ABNORMAL
CO2 SERPL-SCNC: 20 MMOL/L (ref 23–29)
COLOR UR AUTO: YELLOW
CREAT SERPL-MCNC: 2.2 MG/DL (ref 0.5–1.4)
DIFFERENTIAL METHOD: ABNORMAL
EOSINOPHIL # BLD AUTO: 0.1 K/UL (ref 0–0.5)
EOSINOPHIL NFR BLD: 0.9 % (ref 0–8)
ERYTHROCYTE [DISTWIDTH] IN BLOOD BY AUTOMATED COUNT: 13.2 % (ref 11.5–14.5)
EST. GFR  (AFRICAN AMERICAN): 36.5 ML/MIN/1.73 M^2
EST. GFR  (NON AFRICAN AMERICAN): 31.6 ML/MIN/1.73 M^2
GLUCOSE SERPL-MCNC: 101 MG/DL (ref 70–110)
GLUCOSE UR QL STRIP: ABNORMAL
HCT VFR BLD AUTO: 30.5 % (ref 40–54)
HGB BLD-MCNC: 10 G/DL (ref 14–18)
HGB UR QL STRIP: ABNORMAL
IMM GRANULOCYTES # BLD AUTO: 0.15 K/UL (ref 0–0.04)
IMM GRANULOCYTES NFR BLD AUTO: 2 % (ref 0–0.5)
KETONES UR QL STRIP: NEGATIVE
LEUKOCYTE ESTERASE UR QL STRIP: NEGATIVE
LYMPHOCYTES # BLD AUTO: 0.8 K/UL (ref 1–4.8)
LYMPHOCYTES NFR BLD: 11.1 % (ref 18–48)
MCH RBC QN AUTO: 28.8 PG (ref 27–31)
MCHC RBC AUTO-ENTMCNC: 32.8 G/DL (ref 32–36)
MCV RBC AUTO: 88 FL (ref 82–98)
MICROSCOPIC COMMENT: ABNORMAL
MONOCYTES # BLD AUTO: 0.3 K/UL (ref 0.3–1)
MONOCYTES NFR BLD: 4.2 % (ref 4–15)
NEUTROPHILS # BLD AUTO: 6.1 K/UL (ref 1.8–7.7)
NEUTROPHILS NFR BLD: 81.5 % (ref 38–73)
NITRITE UR QL STRIP: NEGATIVE
NRBC BLD-RTO: 0 /100 WBC
PH UR STRIP: 5 [PH] (ref 5–8)
PLATELET # BLD AUTO: 220 K/UL (ref 150–350)
PMV BLD AUTO: 10.9 FL (ref 9.2–12.9)
POTASSIUM SERPL-SCNC: 3.6 MMOL/L (ref 3.5–5.1)
PROT UR QL STRIP: NEGATIVE
RBC # BLD AUTO: 3.47 M/UL (ref 4.6–6.2)
RBC #/AREA URNS AUTO: 2 /HPF (ref 0–4)
SODIUM SERPL-SCNC: 133 MMOL/L (ref 136–145)
SP GR UR STRIP: 1.01 (ref 1–1.03)
SQUAMOUS #/AREA URNS AUTO: 0 /HPF
URN SPEC COLLECT METH UR: ABNORMAL
WBC # BLD AUTO: 7.54 K/UL (ref 3.9–12.7)
WBC #/AREA URNS AUTO: 3 /HPF (ref 0–5)

## 2020-05-20 PROCEDURE — 25000003 PHARM REV CODE 250: Performed by: STUDENT IN AN ORGANIZED HEALTH CARE EDUCATION/TRAINING PROGRAM

## 2020-05-20 PROCEDURE — 99232 PR SUBSEQUENT HOSPITAL CARE,LEVL II: ICD-10-PCS | Mod: ,,, | Performed by: INTERNAL MEDICINE

## 2020-05-20 PROCEDURE — 36415 COLL VENOUS BLD VENIPUNCTURE: CPT

## 2020-05-20 PROCEDURE — 86778 TOXOPLASMA ANTIBODY IGM: CPT

## 2020-05-20 PROCEDURE — 94761 N-INVAS EAR/PLS OXIMETRY MLT: CPT

## 2020-05-20 PROCEDURE — 99232 SBSQ HOSP IP/OBS MODERATE 35: CPT | Mod: ,,, | Performed by: INTERNAL MEDICINE

## 2020-05-20 PROCEDURE — 99233 SBSQ HOSP IP/OBS HIGH 50: CPT | Mod: ,,, | Performed by: HOSPITALIST

## 2020-05-20 PROCEDURE — 99233 PR SUBSEQUENT HOSPITAL CARE,LEVL III: ICD-10-PCS | Mod: ,,, | Performed by: HOSPITALIST

## 2020-05-20 PROCEDURE — 85025 COMPLETE CBC W/AUTO DIFF WBC: CPT

## 2020-05-20 PROCEDURE — 63600175 PHARM REV CODE 636 W HCPCS: Performed by: STUDENT IN AN ORGANIZED HEALTH CARE EDUCATION/TRAINING PROGRAM

## 2020-05-20 PROCEDURE — 80048 BASIC METABOLIC PNL TOTAL CA: CPT

## 2020-05-20 PROCEDURE — 99900035 HC TECH TIME PER 15 MIN (STAT)

## 2020-05-20 PROCEDURE — 81001 URINALYSIS AUTO W/SCOPE: CPT

## 2020-05-20 PROCEDURE — 11000001 HC ACUTE MED/SURG PRIVATE ROOM

## 2020-05-20 PROCEDURE — 63600175 PHARM REV CODE 636 W HCPCS: Performed by: HOSPITALIST

## 2020-05-20 RX ORDER — POTASSIUM CHLORIDE 20 MEQ/1
40 TABLET, EXTENDED RELEASE ORAL
Status: COMPLETED | OUTPATIENT
Start: 2020-05-20 | End: 2020-05-20

## 2020-05-20 RX ORDER — SULFAMETHOXAZOLE AND TRIMETHOPRIM 400; 80 MG/1; MG/1
1 TABLET ORAL DAILY
Status: DISCONTINUED | OUTPATIENT
Start: 2020-05-20 | End: 2020-05-20

## 2020-05-20 RX ORDER — BETAMETHASONE VALERATE 1.2 MG/G
CREAM TOPICAL 2 TIMES DAILY
Status: DISCONTINUED | OUTPATIENT
Start: 2020-05-20 | End: 2020-05-23 | Stop reason: HOSPADM

## 2020-05-20 RX ADMIN — Medication 1250 MG: at 03:05

## 2020-05-20 RX ADMIN — FOLIC ACID 1 MG: 1 TABLET ORAL at 09:05

## 2020-05-20 RX ADMIN — POTASSIUM CHLORIDE 40 MEQ: 1500 TABLET, EXTENDED RELEASE ORAL at 04:05

## 2020-05-20 RX ADMIN — PRAVASTATIN SODIUM 40 MG: 10 TABLET ORAL at 08:05

## 2020-05-20 RX ADMIN — HEPARIN SODIUM 5000 UNITS: 5000 INJECTION INTRAVENOUS; SUBCUTANEOUS at 06:05

## 2020-05-20 RX ADMIN — HEPARIN SODIUM 5000 UNITS: 5000 INJECTION INTRAVENOUS; SUBCUTANEOUS at 02:05

## 2020-05-20 RX ADMIN — SODIUM CHLORIDE 1000 ML: 0.9 INJECTION, SOLUTION INTRAVENOUS at 02:05

## 2020-05-20 RX ADMIN — SULFAMETHOXAZOLE AND TRIMETHOPRIM 1 TABLET: 400; 80 TABLET ORAL at 02:05

## 2020-05-20 RX ADMIN — BETAMETHASONE VALERATE: 1.2 CREAM TOPICAL at 08:05

## 2020-05-20 RX ADMIN — CLINDAMYCIN IN 5 PERCENT DEXTROSE 600 MG: 12 INJECTION, SOLUTION INTRAVENOUS at 06:05

## 2020-05-20 RX ADMIN — FERROUS SULFATE TAB EC 325 MG (65 MG FE EQUIVALENT) 325 MG: 325 (65 FE) TABLET DELAYED RESPONSE at 09:05

## 2020-05-20 RX ADMIN — HEPARIN SODIUM 5000 UNITS: 5000 INJECTION INTRAVENOUS; SUBCUTANEOUS at 08:05

## 2020-05-20 RX ADMIN — METOPROLOL SUCCINATE 50 MG: 50 TABLET, EXTENDED RELEASE ORAL at 09:05

## 2020-05-20 RX ADMIN — DAPSONE 100 MG: 100 TABLET ORAL at 09:05

## 2020-05-20 RX ADMIN — NIFEDIPINE 90 MG: 30 TABLET, FILM COATED, EXTENDED RELEASE ORAL at 09:05

## 2020-05-20 RX ADMIN — POTASSIUM CHLORIDE 40 MEQ: 1500 TABLET, EXTENDED RELEASE ORAL at 02:05

## 2020-05-20 RX ADMIN — ASPIRIN 81 MG: 81 TABLET, COATED ORAL at 09:05

## 2020-05-20 NOTE — SUBJECTIVE & OBJECTIVE
Past Medical History:   Diagnosis Date    Arthritis     Back pain     Chronic kidney disease, stage 3     COPD (chronic obstructive pulmonary disease)     Esophageal reflux     HIV (human immunodeficiency virus infection)     2006    Hypertension     Lumbago     Psoriasis     Skin disorder     Tobacco use        Past Surgical History:   Procedure Laterality Date    FINGER SURGERY      TONSILLECTOMY         Immunization History   Administered Date(s) Administered    Hepatitis B 03/25/2013, 05/10/2013, 12/17/2013    Hepatitis B, Adult 03/25/2013, 05/10/2013, 12/17/2013    Influenza 12/01/2016    Influenza - Quadrivalent - PF (6 months and older) 01/18/2013, 11/13/2013, 09/29/2017, 10/18/2018, 02/13/2020    Influenza - Trivalent (ADULT) 10/22/2014    Influenza - Trivalent - PF (ADULT) 01/18/2013, 11/13/2013    PPD Test 09/16/2009, 12/17/2013    Pneumococcal Conjugate - 13 Valent 06/29/2017    Pneumococcal Polysaccharide - 23 Valent 07/01/2012, 10/18/2018    Td (ADULT) 09/16/2009, 07/01/2012       Review of patient's allergies indicates:   Allergen Reactions    Penicillins Hives    Nsaids (non-steroidal anti-inflammatory drug)      CKD    Sustiva [efavirenz] Other (See Comments)     insomnia     Current Facility-Administered Medications   Medication Frequency    acetaminophen tablet 650 mg Q4H PRN    albuterol sulfate nebulizer solution 2.5 mg Q4H PRN    aspirin EC tablet 81 mg Daily    betamethasone valerate 0.1% cream BID    dapsone tablet 100 mg Daily    dextrose 10% (D10W) Bolus PRN    dextrose 10% (D10W) Bolus PRN    ferrous sulfate EC tablet 325 mg Daily    folic acid tablet 1 mg Daily    glucagon (human recombinant) injection 1 mg PRN    glucose chewable tablet 16 g PRN    glucose chewable tablet 24 g PRN    heparin (porcine) injection 5,000 Units Q8H    metoprolol succinate (TOPROL-XL) 24 hr tablet 50 mg Daily    NIFEdipine 24 hr tablet 90 mg Daily    ondansetron  disintegrating tablet 8 mg Q6H PRN    potassium chloride SA CR tablet 40 mEq Q2H    pravastatin tablet 40 mg QHS    promethazine tablet 12.5 mg Q6H PRN    sodium chloride 0.9% bolus 1,000 mL Once    sodium chloride 0.9% flush 10 mL PRN    sulfamethoxazole-trimethoprim 400-80mg per tablet 1 tablet Daily    vancomycin - pharmacy to dose pharmacy to manage frequency    vancomycin 1.25 g in dextrose 5% 250 mL IVPB (ready to mix) Once     Family History     Problem Relation (Age of Onset)    Arthritis Mother, Sister    Hypertension Mother, Sister    No Known Problems Father        Tobacco Use    Smoking status: Current Every Day Smoker     Packs/day: 0.25     Years: 40.00     Pack years: 10.00     Types: Cigarettes    Smokeless tobacco: Never Used   Substance and Sexual Activity    Alcohol use: Yes     Alcohol/week: 0.0 standard drinks     Comment: occassional beer; pt drank beer this am    Drug use: Yes     Types: Marijuana     Comment: occasional use of marijuana    Sexual activity: Never     Partners: Male     Birth control/protection: Condom     Review of Systems   Constitutional: Negative for fatigue and fever.   HENT: Negative for congestion, mouth sores and trouble swallowing.    Eyes: Negative for photophobia, pain, redness and visual disturbance.   Respiratory: Negative for cough and shortness of breath.    Cardiovascular: Negative for chest pain and palpitations.   Gastrointestinal: Negative for abdominal pain, constipation, diarrhea, nausea and vomiting.   Genitourinary: Negative for difficulty urinating and dysuria.   Musculoskeletal: Positive for arthralgias and joint swelling. Negative for back pain, gait problem, myalgias, neck pain and neck stiffness.   Skin: Positive for rash. Negative for pallor.   Neurological: Negative for dizziness, weakness and headaches.   Hematological: Negative for adenopathy. Does not bruise/bleed easily.   Psychiatric/Behavioral: Negative for agitation and  confusion.     Objective:     Vital Signs (Most Recent):  Temp: 97.1 °F (36.2 °C) (05/20/20 1050)  Pulse: 70 (05/20/20 1050)  Resp: 20 (05/20/20 1050)  BP: 120/61 (05/20/20 1050)  SpO2: 99 % (05/20/20 1050)  O2 Device (Oxygen Therapy): room air (05/20/20 1040) Vital Signs (24h Range):  Temp:  [97.1 °F (36.2 °C)-99.6 °F (37.6 °C)] 97.1 °F (36.2 °C)  Pulse:  [61-79] 70  Resp:  [16-20] 20  SpO2:  [97 %-100 %] 99 %  BP: ()/(55-95) 120/61     Weight: 61.5 kg (135 lb 11.1 oz) (05/19/20 1920)  Body mass index is 20.04 kg/m².  Body surface area is 1.73 meters squared.      Intake/Output Summary (Last 24 hours) at 5/20/2020 1426  Last data filed at 5/20/2020 0600  Gross per 24 hour   Intake 240 ml   Output 600 ml   Net -360 ml       Physical Exam   Vitals reviewed.  Constitutional: He is oriented to person, place, and time and well-developed, well-nourished, and in no distress. No distress.   HENT:   Head: Normocephalic and atraumatic.   Right Ear: External ear normal.   Left Ear: External ear normal.   Nose: Nose normal.   Mouth/Throat: No oropharyngeal exudate.   Poor dentition   Eyes: Conjunctivae and EOM are normal. Pupils are equal, round, and reactive to light. Right eye exhibits no discharge. Left eye exhibits no discharge. No scleral icterus.   Neck: Neck supple. No thyromegaly present.   Cardiovascular: Normal rate, regular rhythm, normal heart sounds and intact distal pulses.    No murmur heard.  Pulmonary/Chest: Effort normal and breath sounds normal. No respiratory distress. He has no wheezes. He has no rales. He exhibits no tenderness.   Abdominal: Soft. Bowel sounds are normal. He exhibits no distension. There is no tenderness. There is no guarding.   Neurological: He is alert and oriented to person, place, and time.   Skin: Skin is warm and dry. Rash noted. He is not diaphoretic. No erythema.     Psychiatric: Mood and affect normal.   Musculoskeletal: Normal range of motion. He exhibits edema and  tenderness. He exhibits no deformity.                     Significant Labs:  All pertinent lab results from the last 24 hours have been reviewed.    Significant Imaging:  Imaging results within the past 24 hours have been reviewed.

## 2020-05-20 NOTE — SUBJECTIVE & OBJECTIVE
Interval History: Rheumatology consulted to assess foot and tap for fluid analysis. Empiric vancomycin administered to cover for infectious process. Xray left ankle shows no acute process. Immunosuppressed ID consulted for assistance with medication management. UA ordered given LISA showing few bacteria, no leukocytes and no nitrites.     Review of Systems   Constitutional: Positive for fatigue and unexpected weight change. Negative for chills and fever.   HENT: Positive for dental problem. Negative for sore throat and trouble swallowing.    Eyes: Negative for photophobia and visual disturbance.   Respiratory: Negative for cough, chest tightness and shortness of breath.    Cardiovascular: Positive for leg swelling (Left LE to ankle). Negative for chest pain.   Gastrointestinal: Negative for abdominal distention, abdominal pain and diarrhea.   Genitourinary: Negative for difficulty urinating and dysuria.   Musculoskeletal: Negative for arthralgias and back pain.   Skin: Positive for rash.        Psoriasis present on all extremities     Allergic/Immunologic: Positive for immunocompromised state.   Neurological: Negative for dizziness, tremors, speech difficulty and light-headedness.   Psychiatric/Behavioral: Negative for agitation, behavioral problems and confusion.     Objective:     Vital Signs (Most Recent):  Temp: 97.4 °F (36.3 °C) (05/20/20 1459)  Pulse: 62 (05/20/20 1459)  Resp: 18 (05/20/20 1459)  BP: (!) 109/57 (05/20/20 1459)  SpO2: 99 % (05/20/20 1459) Vital Signs (24h Range):  Temp:  [97.1 °F (36.2 °C)-99.6 °F (37.6 °C)] 97.4 °F (36.3 °C)  Pulse:  [61-79] 62  Resp:  [16-20] 18  SpO2:  [97 %-100 %] 99 %  BP: ()/(55-95) 109/57     Weight: 61.5 kg (135 lb 11.1 oz)  Body mass index is 20.04 kg/m².    Physical Exam   Constitutional: He is oriented to person, place, and time. He appears well-developed and well-nourished. He has a sickly appearance. No distress.   HENT:   Head: Normocephalic and atraumatic.    Eyes: Pupils are equal, round, and reactive to light. EOM are normal.   Neck: Normal range of motion. Neck supple.   Cardiovascular: Normal rate and regular rhythm.   Pulmonary/Chest: Effort normal and breath sounds normal. No respiratory distress.   Abdominal: Soft. Bowel sounds are normal. There is no tenderness.   Musculoskeletal: Normal range of motion. He exhibits edema (Severe swelling of left LE).   Neurological: He is alert and oriented to person, place, and time.   Skin: Skin is warm and dry. Rash noted. He is not diaphoretic.   Rash on lower arms and hands.  Patchy silverscally rash on trunk, mostly back.   Psychiatric: His speech is delayed. He is slowed. Cognition and memory are impaired. He is attentive.         CRANIAL NERVES     CN III, IV, VI   Pupils are equal, round, and reactive to light.  Extraocular motions are normal.        Significant Labs: All pertinent labs within the past 24 hours have been reviewed.    Significant Imaging: I have reviewed all pertinent imaging results/findings within the past 24 hours.

## 2020-05-20 NOTE — PROGRESS NOTES
[facility] Hospital Medicine  Progress Note    Patient Name: Andrew Velazquez  MRN: [id number]  Patient Class: IP- Inpatient   Admission Date: 5/19/2020  Length of Stay: 1 days  Attending Physician: Gosia Vanegas MD  Primary Care Provider: Marisol Tejada MD    [facility] Medicine Team: [facility] HOSP MED 5 Tanner Lovelace MD    Subjective:     Principal Problem:Pain and swelling of left lower extremity        HPI:  Patient is a 60 yo male with a pmh of AIDS (CD4 count in the 40s), COPD, HTN, polysubstance abuse, and CKD who presents with a chief complaint of lower left leg pain. He states the pain has been present for about 2 weeks, has been worsening, and is accompanied by swelling to the level of the ankle.  Pain is worst in foot/ankle region, and he rates it as a 6/10 at worst.   He denies any history of trauma.   He states the only associated symptom is decreased appetite. He denies diarrhea, headache, SOB, recent fever, cough, and abdominal pain.    Overview/Hospital Course:  Rheumatology consulted to assess foot and tap for fluid analysis. Blood cultures positive for GPCs- started empiric vancomycin. Xray left ankle shows no acute process. LLE ultrasound negative for DVT. Immunosuppressed ID consulted for assistance with medication management. UA ordered given LISA showing few bacteria, no leukocytes and no nitrites.     Interval History: Rheumatology consulted to assess foot and tap for fluid analysis. Empiric vancomycin administered to cover for infectious process. Xray left ankle shows no acute process. Immunosuppressed ID consulted for assistance with medication management. UA ordered given LISA showing few bacteria, no leukocytes and no nitrites.     Review of Systems   Constitutional: Positive for fatigue and unexpected weight change. Negative for chills and fever.   HENT: Positive for dental problem. Negative for sore throat and trouble swallowing.    Eyes: Negative for photophobia and  visual disturbance.   Respiratory: Negative for cough, chest tightness and shortness of breath.    Cardiovascular: Positive for leg swelling (Left LE to ankle). Negative for chest pain.   Gastrointestinal: Negative for abdominal distention, abdominal pain and diarrhea.   Genitourinary: Negative for difficulty urinating and dysuria.   Musculoskeletal: Negative for arthralgias and back pain.   Skin: Positive for rash.        Psoriasis present on all extremities     Allergic/Immunologic: Positive for immunocompromised state.   Neurological: Negative for dizziness, tremors, speech difficulty and light-headedness.   Psychiatric/Behavioral: Negative for agitation, behavioral problems and confusion.     Objective:     Vital Signs (Most Recent):  Temp: 97.4 °F (36.3 °C) (05/20/20 1459)  Pulse: 62 (05/20/20 1459)  Resp: 18 (05/20/20 1459)  BP: (!) 109/57 (05/20/20 1459)  SpO2: 99 % (05/20/20 1459) Vital Signs (24h Range):  Temp:  [97.1 °F (36.2 °C)-99.6 °F (37.6 °C)] 97.4 °F (36.3 °C)  Pulse:  [61-79] 62  Resp:  [16-20] 18  SpO2:  [97 %-100 %] 99 %  BP: ()/(55-95) 109/57     Weight: 61.5 kg (135 lb 11.1 oz)  Body mass index is 20.04 kg/m².    Physical Exam   Constitutional: He is oriented to person, place, and time. He appears well-developed and well-nourished. He has a sickly appearance. No distress.   HENT:   Head: Normocephalic and atraumatic.   Eyes: Pupils are equal, round, and reactive to light. EOM are normal.   Neck: Normal range of motion. Neck supple.   Cardiovascular: Normal rate and regular rhythm.   Pulmonary/Chest: Effort normal and breath sounds normal. No respiratory distress.   Abdominal: Soft. Bowel sounds are normal. There is no tenderness.   Musculoskeletal: Normal range of motion. He exhibits edema (Severe swelling of left LE).   Neurological: He is alert and oriented to person, place, and time.   Skin: Skin is warm and dry. Rash noted. He is not diaphoretic.   Rash on lower arms and hands.  Patchy  silverscally rash on trunk, mostly back.   Psychiatric: His speech is delayed. He is slowed. Cognition and memory are impaired. He is attentive.         CRANIAL NERVES     CN III, IV, VI   Pupils are equal, round, and reactive to light.  Extraocular motions are normal.        Significant Labs: All pertinent labs within the past 24 hours have been reviewed.    Significant Imaging: I have reviewed all pertinent imaging results/findings within the past 24 hours.      Assessment/Plan:      * Pain and swelling of left lower extremity  Subacute gout(uric acid  v psoriatic arthritis(unusual to be localized to single large joint only) v. HIV arthritis  -Clindamycin 600 mg IV q8h administered and DC'd  -Rheumatology consulted to assess foot and tap for fluid analysis.   -severe hyperuricemia- uric acid 10.3 on admit  -Blood culture with GPCs- Started empiric vancomycin administered   -Xray left ankle shows no acute process.   -US LLE negative for DVT      LISA (acute kidney injury)  -asymptomatic and normal WBC; BUN/Cr 57/2.6- trending down after fluid administration  -fluids  -UA ordered given LISA showing few bacteria, no leukocytes and no nitrites.       HLD (hyperlipidemia)  Continued home pravastatin 40 mg      Psoriasis  -held once weekly methotrexate      HTN (hypertension)  -started home toprol 50 mg qd and nifedipine 90 mg qd  -held hydrochlorothiazide due to LISA      AIDS (acquired immune deficiency syndrome)  -continued home dapsone  -started bactrim for opportunistic infection prophylaxis  - Patient on symtuza for AIDS/HIV at home- did not bring medication to the hospital and medicine not on formulary- consulted immunocompromised ID, will discuss medication options and prophylaxis           VTE Risk Mitigation (From admission, onward)         Ordered     heparin (porcine) injection 5,000 Units  Every 8 hours      05/19/20 1841     IP VTE HIGH RISK PATIENT  Once      05/19/20 1841     Place sequential compression  device  Until discontinued      05/19/20 9814                      Tanner Lovelace MD  Department of Hospital Medicine   [facility]

## 2020-05-20 NOTE — SUBJECTIVE & OBJECTIVE
Past Medical History:   Diagnosis Date    Arthritis     Back pain     Chronic kidney disease, stage 3     COPD (chronic obstructive pulmonary disease)     Esophageal reflux     HIV (human immunodeficiency virus infection)     2006    Hypertension     Lumbago     Psoriasis     Skin disorder     Tobacco use        Past Surgical History:   Procedure Laterality Date    FINGER SURGERY      TONSILLECTOMY         Review of patient's allergies indicates:   Allergen Reactions    Penicillins Hives    Nsaids (non-steroidal anti-inflammatory drug)      CKD    Sustiva [efavirenz] Other (See Comments)     insomnia       No current facility-administered medications on file prior to encounter.      Current Outpatient Medications on File Prior to Encounter   Medication Sig    albuterol (PROVENTIL/VENTOLIN HFA) 90 mcg/actuation inhaler Inhale 1-2 puffs into the lungs every 6 (six) hours as needed for Wheezing (sob).    ferrous sulfate (FEOSOL) 325 mg (65 mg iron) Tab tablet Take 1 tablet (325 mg total) by mouth once daily.    folic acid (FOLVITE) 1 MG tablet Take 1 tablet (1 mg total) by mouth once daily.    hydroCHLOROthiazide (HYDRODIURIL) 25 MG tablet Take 1 tablet (25 mg total) by mouth once daily.    methotrexate 2.5 MG Tab Take 4 tablets by mouth every 7 days    naproxen (NAPROSYN) 500 MG tablet Take 500 mg by mouth 2 (two) times daily with meals.    zolpidem (AMBIEN) 10 mg Tab TAKE ONE TABLET BY MOUTH ONCE A DAY AT BEDTIME    aspirin (ECOTRIN) 81 MG EC tablet Take 1 tablet (81 mg total) by mouth once daily. (Patient taking differently: Take 81 mg by mouth 3 (three) times a week. )    azithromycin (ZITHROMAX) 600 MG Tab Take 2 tablets (1,200 mg total) by mouth every 7 days.    b complex vitamins tablet Take 1 tablet by mouth once daily.    betamethasone dipropionate (DIPROLENE) 0.05 % cream Apply topically 2 (two) times daily.    dapsone 100 MG Tab Take 1 tablet (100 mg total) by mouth once daily.     darunavir-lisa-emtri-tenof ala (SYMTUZA) [phone] mg Tab Take 1 tablet by mouth once daily. Stop truvada and prezcobix    diphenhydrAMINE (BENADRYL) 25 mg capsule Take 1 capsule (25 mg total) by mouth every 6 (six) hours as needed for Itching or Allergies.    HYDROcodone-acetaminophen (NORCO) 7.5-325 mg per tablet Take 1 tablet by mouth every 6 (six) hours as needed for Pain. 90 to last 30 days.  Do not fill until 2020    metoprolol succinate (TOPROL-XL) 50 MG 24 hr tablet Take 1 tablet (50 mg total) by mouth once daily.    NIFEdipine (PROCARDIA-XL) 90 MG (OSM) 24 hr tablet Take 1 tablet (90 mg total) by mouth once daily.    pravastatin (PRAVACHOL) 40 MG tablet Take 1 tablet (40 mg total) by mouth every evening.    [] predniSONE (DELTASONE) 20 MG tablet Take 2 tablets (40 mg total) by mouth once daily. for 5 days    [DISCONTINUED] nebivolol (BYSTOLIC) 10 MG Tab Take 1 tablet (10 mg total) by mouth once daily.     Family History     Problem Relation (Age of Onset)    Arthritis Mother, Sister    Hypertension Mother, Sister    No Known Problems Father        Tobacco Use    Smoking status: Current Every Day Smoker     Packs/day: 0.25     Years: 40.00     Pack years: 10.00     Types: Cigarettes    Smokeless tobacco: Never Used   Substance and Sexual Activity    Alcohol use: Yes     Alcohol/week: 0.0 standard drinks     Comment: occassional beer; pt drank beer this am    Drug use: Yes     Types: Marijuana     Comment: occasional use of marijuana    Sexual activity: Never     Partners: Male     Birth control/protection: Condom     Review of Systems   Constitutional: Positive for fatigue and unexpected weight change. Negative for chills and fever.   HENT: Positive for dental problem. Negative for sore throat and trouble swallowing.    Eyes: Negative for photophobia and visual disturbance.   Respiratory: Negative for cough, chest tightness and shortness of breath.    Cardiovascular: Positive  for leg swelling (Left LE to ankle). Negative for chest pain.   Gastrointestinal: Negative for abdominal distention, abdominal pain and diarrhea.   Genitourinary: Negative for difficulty urinating and dysuria.   Musculoskeletal: Negative for arthralgias and back pain.   Skin: Positive for rash.        Psoriasis present on all extremities     Allergic/Immunologic: Positive for immunocompromised state.   Neurological: Negative for dizziness, tremors, speech difficulty and light-headedness.   Psychiatric/Behavioral: Negative for agitation, behavioral problems and confusion.     Objective:     Vital Signs (Most Recent):  Temp: 97.9 °F (36.6 °C) (05/19/20 1920)  Pulse: 79 (05/19/20 1920)  Resp: 18 (05/19/20 1920)  BP: (!) 148/95 (05/19/20 1920)  SpO2: 97 % (05/19/20 1920) Vital Signs (24h Range):  Temp:  [97.9 °F (36.6 °C)-99.4 °F (37.4 °C)] 97.9 °F (36.6 °C)  Pulse:  [61-79] 79  Resp:  [18] 18  SpO2:  [97 %-98 %] 97 %  BP: (148-198)/(74-95) 148/95     Weight: 61.5 kg (135 lb 11.1 oz)  Body mass index is 20.04 kg/m².    Physical Exam   Constitutional: He is oriented to person, place, and time. He appears well-developed and well-nourished. He has a sickly appearance. No distress.   HENT:   Head: Normocephalic and atraumatic.   Eyes: Pupils are equal, round, and reactive to light. EOM are normal.   Neck: Normal range of motion. Neck supple.   Cardiovascular: Normal rate and regular rhythm.   Pulmonary/Chest: Effort normal and breath sounds normal. No respiratory distress.   Abdominal: Soft. Bowel sounds are normal. There is no tenderness.   Musculoskeletal: Normal range of motion. He exhibits edema (Severe swelling of left LE).   Neurological: He is alert and oriented to person, place, and time.   Skin: Skin is warm and dry. Rash noted. He is not diaphoretic.   Rash on lower arms and hands.  Patchy silverscally rash on trunk, mostly back.   Psychiatric: His speech is delayed. He is slowed. Cognition and memory are  impaired. He is attentive.         CRANIAL NERVES     CN III, IV, VI   Pupils are equal, round, and reactive to light.  Extraocular motions are normal.        Significant Labs: All pertinent labs within the past 24 hours have been reviewed.    Significant Imaging: I have reviewed all pertinent imaging results/findings within the past 24 hours.

## 2020-05-20 NOTE — CONSULTS
[facility]  Rheumatology  Consult Note    Patient Name: Andrew Velazquez  MRN: [id number]  Admission Date: 5/19/2020  Hospital Length of Stay: 1 days  Code Status: Prior   Attending Provider: Gosia Vanegas MD  Primary Care Physician: Marisol Tejada MD  Principal Problem:Cellulitis of left lower extremity    Inpatient consult to Rheumatology  Consult performed by: Jayne Sandoval MD  Consult ordered by: Tanner Lovelace MD        Subjective:     HPI: Mr. Andrew Velazquez, 60 yo male with a pmh of HTN, HLD, CKD 3, human immunodeficiency virus (HIV) with acquired immune deficiency syndrome (AIDS (CD4 count in the 31)), psoriasis, polysubstance abuse diverticulosis, cigarette smoking with chronic obstructive pulmonary disease, history of left pontine lacunar stroke, gastroesophageal reflux disease, iron deficiency anemia, lumbago. He lives in [place], [place]. His infectious disease doctor is Dr. Marisol Tejada.     He presents with a chief complaint of lower left leg pain. He states the pain has been present for about 2 weeks, has been worsening, and is accompanied by swelling to the level of the ankle.  Pain is worst in foot/ankle region, and he rates it as a 6/10 at worst.  He denies any history of trauma.   He states the only associated symptom is decreased appetite. He denies diarrhea, headache, SOB, recent fever, cough, and abdominal pain.    Pt had similar presentation 1/2020.He presented to [facility] emergency department on 1/11/20 with right lower extremity pain from the thigh to ankle associated with erythema and edema, with no preceding trauma. He reported similar symptoms of his left lower extremity a few months ago. Labs showed elevated WBC count (98778), elevated sed rate (>120), elevated CRP (250). X-ray of his leg showed mild soft tissue swelling at the ankle. Venous ultrasound showed no deep venous thrombosis. He was given clindamycin in the emergency department. He  "was admitted to [facility] for observation. He was given IV clindamycin overnight. He had very dry skin of his feet and toes and very thickened toenails, which he was advised can easily lead to cellulitis. He was discharged the next day with prescriptions for 6 days of clindamycin and topical mupirocin.      Pt has had multiple ED visits/admissions for LE pain/swelling every few months. Pt states his PCP is his ID doctor. States he is compliant with his HIV meds and has only been out since Friday. He states that abx and steroid shot in the back side always help. Pain/swelling only occurs in the Left ankle/lower leg per pt. Denies any other jt involvement, no swelling or pain. Denies any new rashes although his known psoriasis diagnose 6 yrs ago has "flares" once a year or so. He sees derm at [facility] and says he uses triamcinolone cream which usually helps. Last yr, he tried UV light therapy x 3 months. Pt is switching to [facility] and has upcoming appt end of this month. Labs with very elevated inflammatory markers ESR 89, .3. Uric acid 10.3. Last HIV ,615 and CD4 31 on 5/15. Blood cx + GPC resembling strep. Pt was placed on clindamycin for possible cellulitis and now switched to Vancomycin. U/S leg neg for DVT. Xray ordered.    Rheumatology consulted for L ankle swelling, possible gout.    Past Medical History:   Diagnosis Date    Arthritis     Back pain     Chronic kidney disease, stage 3     COPD (chronic obstructive pulmonary disease)     Esophageal reflux     HIV (human immunodeficiency virus infection)     2006    Hypertension     Lumbago     Psoriasis     Skin disorder     Tobacco use        Past Surgical History:   Procedure Laterality Date    FINGER SURGERY      TONSILLECTOMY         Immunization History   Administered Date(s) Administered    Hepatitis B 03/25/2013, 05/10/2013, 12/17/2013    Hepatitis B, Adult 03/25/2013, 05/10/2013, 12/17/2013    Influenza " 12/01/2016    Influenza - Quadrivalent - PF (6 months and older) 01/18/2013, 11/13/2013, 09/29/2017, 10/18/2018, 02/13/2020    Influenza - Trivalent (ADULT) 10/22/2014    Influenza - Trivalent - PF (ADULT) 01/18/2013, 11/13/2013    PPD Test 09/16/2009, 12/17/2013    Pneumococcal Conjugate - 13 Valent 06/29/2017    Pneumococcal Polysaccharide - 23 Valent 07/01/2012, 10/18/2018    Td (ADULT) 09/16/2009, 07/01/2012       Review of patient's allergies indicates:   Allergen Reactions    Penicillins Hives    Nsaids (non-steroidal anti-inflammatory drug)      CKD    Sustiva [efavirenz] Other (See Comments)     insomnia     Current Facility-Administered Medications   Medication Frequency    acetaminophen tablet 650 mg Q4H PRN    albuterol sulfate nebulizer solution 2.5 mg Q4H PRN    aspirin EC tablet 81 mg Daily    betamethasone valerate 0.1% cream BID    dapsone tablet 100 mg Daily    dextrose 10% (D10W) Bolus PRN    dextrose 10% (D10W) Bolus PRN    ferrous sulfate EC tablet 325 mg Daily    folic acid tablet 1 mg Daily    glucagon (human recombinant) injection 1 mg PRN    glucose chewable tablet 16 g PRN    glucose chewable tablet 24 g PRN    heparin (porcine) injection 5,000 Units Q8H    metoprolol succinate (TOPROL-XL) 24 hr tablet 50 mg Daily    NIFEdipine 24 hr tablet 90 mg Daily    ondansetron disintegrating tablet 8 mg Q6H PRN    potassium chloride SA CR tablet 40 mEq Q2H    pravastatin tablet 40 mg QHS    promethazine tablet 12.5 mg Q6H PRN    sodium chloride 0.9% bolus 1,000 mL Once    sodium chloride 0.9% flush 10 mL PRN    sulfamethoxazole-trimethoprim 400-80mg per tablet 1 tablet Daily    vancomycin - pharmacy to dose pharmacy to manage frequency    vancomycin 1.25 g in dextrose 5% 250 mL IVPB (ready to mix) Once     Family History     Problem Relation (Age of Onset)    Arthritis Mother, Sister    Hypertension Mother, Sister    No Known Problems Father        Tobacco Use     Smoking status: Current Every Day Smoker     Packs/day: 0.25     Years: 40.00     Pack years: 10.00     Types: Cigarettes    Smokeless tobacco: Never Used   Substance and Sexual Activity    Alcohol use: Yes     Alcohol/week: 0.0 standard drinks     Comment: occassional beer; pt drank beer this am    Drug use: Yes     Types: Marijuana     Comment: occasional use of marijuana    Sexual activity: Never     Partners: Male     Birth control/protection: Condom     Review of Systems   Constitutional: Negative for fatigue and fever.   HENT: Negative for congestion, mouth sores and trouble swallowing.    Eyes: Negative for photophobia, pain, redness and visual disturbance.   Respiratory: Negative for cough and shortness of breath.    Cardiovascular: Negative for chest pain and palpitations.   Gastrointestinal: Negative for abdominal pain, constipation, diarrhea, nausea and vomiting.   Genitourinary: Negative for difficulty urinating and dysuria.   Musculoskeletal: Positive for arthralgias and joint swelling. Negative for back pain, gait problem, myalgias, neck pain and neck stiffness.   Skin: Positive for rash. Negative for pallor.   Neurological: Negative for dizziness, weakness and headaches.   Hematological: Negative for adenopathy. Does not bruise/bleed easily.   Psychiatric/Behavioral: Negative for agitation and confusion.     Objective:     Vital Signs (Most Recent):  Temp: 97.1 °F (36.2 °C) (05/20/20 1050)  Pulse: 70 (05/20/20 1050)  Resp: 20 (05/20/20 1050)  BP: 120/61 (05/20/20 1050)  SpO2: 99 % (05/20/20 1050)  O2 Device (Oxygen Therapy): room air (05/20/20 1040) Vital Signs (24h Range):  Temp:  [97.1 °F (36.2 °C)-99.6 °F (37.6 °C)] 97.1 °F (36.2 °C)  Pulse:  [61-79] 70  Resp:  [16-20] 20  SpO2:  [97 %-100 %] 99 %  BP: ()/(55-95) 120/61     Weight: 61.5 kg (135 lb 11.1 oz) (05/19/20 1920)  Body mass index is 20.04 kg/m².  Body surface area is 1.73 meters squared.      Intake/Output Summary (Last 24 hours)  at 5/20/2020 1426  Last data filed at 5/20/2020 0600  Gross per 24 hour   Intake 240 ml   Output 600 ml   Net -360 ml       Physical Exam   Vitals reviewed.  Constitutional: He is oriented to person, place, and time and well-developed, well-nourished, and in no distress. No distress.   HENT:   Head: Normocephalic and atraumatic.   Right Ear: External ear normal.   Left Ear: External ear normal.   Nose: Nose normal.   Mouth/Throat: No oropharyngeal exudate.   Poor dentition   Eyes: Conjunctivae and EOM are normal. Pupils are equal, round, and reactive to light. Right eye exhibits no discharge. Left eye exhibits no discharge. No scleral icterus.   Neck: Neck supple. No thyromegaly present.   Cardiovascular: Normal rate, regular rhythm, normal heart sounds and intact distal pulses.    No murmur heard.  Pulmonary/Chest: Effort normal and breath sounds normal. No respiratory distress. He has no wheezes. He has no rales. He exhibits no tenderness.   Abdominal: Soft. Bowel sounds are normal. He exhibits no distension. There is no tenderness. There is no guarding.   Neurological: He is alert and oriented to person, place, and time.   Skin: Skin is warm and dry. Rash noted. He is not diaphoretic. No erythema.     Psychiatric: Mood and affect normal.   Musculoskeletal: Normal range of motion. He exhibits edema and tenderness. He exhibits no deformity.                     Significant Labs:  All pertinent lab results from the last 24 hours have been reviewed.    Significant Imaging:  Imaging results within the past 24 hours have been reviewed.    Assessment/Plan:     Left ankle swelling   58 yo male with a pmh of HTN, HLD, CKD 3, human immunodeficiency virus (HIV) with acquired immune deficiency syndrome (AIDS (CD4 count in the 31)), psoriasis, polysubstance abuse diverticulosis, cigarette smoking with chronic obstructive pulmonary disease, history of left pontine lacunar stroke, gastroesophageal reflux disease, iron deficiency  "anemia, lumbago. His infectious disease doctor and PCP is Dr. Marisol Tejada.     He presents with a chief complaint of lower left leg pain. He states the pain has been present for about 2 weeks, has been worsening, and is accompanied by swelling to the level of the ankle.  Pain is worst in foot/ankle region, and he rates it as a 6/10 at worst.  He denies any history of trauma.    Pt has had multiple ED visits/admissions for LE pain/swelling every few months. He states that abx and steroid shot in the back side always help. Pain/swelling only occurs in the Left ankle/lower leg per pt. Denies any other jt involvement, no swelling or pain. Denies any new rashes although his known psoriasis diagnose 6 yrs ago has "flares" once a year or so. He sees derm at [facility] and says he uses triamcinolone cream which usually helps. Last yr, he tried UV light therapy x 3 months. Pt is switching to [facility] and has upcoming appt end of this month. Labs with very elevated inflammatory markers ESR 89, .3. Uric acid 10.3. Last HIV ,615 and CD4 31 on 5/15. Blood cx + GPC resembling strep. Pt was placed on clindamycin for possible cellulitis and now switched to Vancomycin. U/S leg neg for DVT. Xray ordered.    Rheumatology consulted for L ankle swelling, possible gout.    Plan:  - differential dx infectious/septic joint/cellulitis with uncontrolled AIDS and blood cx with GPCs vs gout vs CPPD vs other  - f/u ID recs  - LE U/S neg for DVT. Pending xray foot  - L ankle with soft tissue swelling but will discuss with Dr. Rushing if able to get fluid sample. Caution with pt as possible cellulitis over joint and do not want to introduce bacteria  - consider dermatology consult for psoriasis but pt has outpt derm appt coming up    Will discuss with Dr. Rushing. Staff attestation to follow.      Thank you for your consult. I will follow-up with patient. Please contact us if you have any additional questions.    Jayne Sandoval, " MD  Rheumatology  [facility]      I  Have personally take the history and examined the patient and agree with fellow's note as stated above.    The left leg is edematous to the knee with diffuse moderate warmth, dry cracked/scaly skin, peau d'orange appearance in places. Pt reports recurrent attacks identical to current over 5-6 yrs.This episode of 2 wks duration. He has able to walk on left leg. Can plantar flex and dorsiflex left ankle without much discomfort. The ankle is surrounded by dry scaly skin with pitting edema and warmth involving dorsal foot and lower leg, not confined to ankle itself. No definite ankle effusion and has full ROM active and passive, and no more tender than rest of foot and leg        Left ankle and leg pain and swelling(VDUS negative for DVT), subacute and recurrent: DDx septic arthritis(.3, ESR 89, +GPC BC, +HIV-AIDS with CD4 31 but physical exam not c/w)  v. Subacute gout(uric acid very elevated, but has been going on 2 wks which is rather long for even untreated gout attack which typically spontaneously subside  v psoriatic arthritis(unusual to be localized to single large joint only) v. HIV arthritis(usually resembles septic arthritis)  Overall, more compatible with cellulitis rather than septic or other true arthritis of the left ankle  Palmar>> plantar  Psoriasis  Severe hyperuricemia  HIV AIDS, very low CD4(31) ,000  Markedly elevated CRP  Blood culture with GPC resembling Strep    Suspicion of inflammatory arthritis left ankle extremely low based on symptoms and exam, and risk of introducing bacteria through cellulitis into the ankle joint  Agree with vancomycin  Agree with ID consult  Resume ART Rx  Dermatology consult for psoriasis  PT

## 2020-05-20 NOTE — NURSING
Pt transferred from ED to room 707. Pt oriented to room, safety, and fall precautions discussed. Pt has no complaints at this time. Will continue to monitor.

## 2020-05-20 NOTE — ASSESSMENT & PLAN NOTE
-continued home dapsone  -started bactrim for opportunistic infection prophylaxis  - Patient on symtuza for AIDS/HIV at home- did not bring medication to the hospital and medicine not on formulary- will discuss options with ID

## 2020-05-20 NOTE — HPI
Mr. Andrew Velazquez, 58 yo male with a pmh of HTN, HLD, CKD 3, human immunodeficiency virus (HIV) with acquired immune deficiency syndrome (AIDS (CD4 count in the 31)), psoriasis, polysubstance abuse diverticulosis, cigarette smoking with chronic obstructive pulmonary disease, history of left pontine lacunar stroke, gastroesophageal reflux disease, iron deficiency anemia, lumbago. He lives in [place], [place]. His infectious disease doctor is Dr. Marisol Tejada.     He presents with a chief complaint of lower left leg pain. He states the pain has been present for about 2 weeks, has been worsening, and is accompanied by swelling to the level of the ankle.  Pain is worst in foot/ankle region, and he rates it as a 6/10 at worst.  He denies any history of trauma.   He states the only associated symptom is decreased appetite. He denies diarrhea, headache, SOB, recent fever, cough, and abdominal pain.    Pt had similar presentation 1/2020.He presented to [facility] emergency department on 1/11/20 with right lower extremity pain from the thigh to ankle associated with erythema and edema, with no preceding trauma. He reported similar symptoms of his left lower extremity a few months ago. Labs showed elevated WBC count (55227), elevated sed rate (>120), elevated CRP (250). X-ray of his leg showed mild soft tissue swelling at the ankle. Venous ultrasound showed no deep venous thrombosis. He was given clindamycin in the emergency department. He was admitted to [facility] for observation. He was given IV clindamycin overnight. He had very dry skin of his feet and toes and very thickened toenails, which he was advised can easily lead to cellulitis. He was discharged the next day with prescriptions for 6 days of clindamycin and topical mupirocin.      Pt has had multiple ED visits/admissions for LE pain/swelling every few months. Pt states his PCP is his ID doctor. States he is compliant with his  "HIV meds and has only been out since Friday. He states that abx and steroid shot in the back side always help. Pain/swelling only occurs in the Left ankle/lower leg per pt. Denies any other jt involvement, no swelling or pain. Denies any new rashes although his known psoriasis diagnose 6 yrs ago has "flares" once a year or so. He sees derm at [facility] and says he uses triamcinolone cream which usually helps. Last yr, he tried UV light therapy x 3 months. Pt is switching to [facility] and has upcoming appt end of this month. Labs with very elevated inflammatory markers ESR 89, .3. Uric acid 10.3. Last HIV ,615 and CD4 31 on 5/15. Blood cx + GPC resembling strep. Pt was placed on clindamycin for possible cellulitis and now switched to Vancomycin. U/S leg neg for DVT. Xray ordered.    Rheumatology consulted for L ankle swelling, possible gout.  "

## 2020-05-20 NOTE — NURSING
Notified doctor on call regarding pt's positive anaerobic bottle was positive for gram positive cocci in chains, resembling staph. Will continue to monitor.

## 2020-05-20 NOTE — HPI
Patient is a 58 yo male with a pmh of AIDS (CD4 count in the 40s), COPD, HTN, polysubstance abuse, and CKD who presents with a chief complaint of lower left leg pain. He states the pain has been present for about 2 weeks, has been worsening, and is accompanied by swelling to the level of the ankle.  Pain is worst in foot/ankle region, and he rates it as a 6/10 at worst.  He denies any history of trauma.  He states the only associated symptom is decreased appetite. He denies diarrhea, headache, SOB, recent fever, cough, and abdominal pain.

## 2020-05-20 NOTE — HOSPITAL COURSE
Rheumatology consulted to assess foot and tap for fluid analysis but will not tap join due to risk of cellulitis. Blood cultures positive for GPCs- started empiric vancomycin. Xray left ankle shows no acute process. LLE ultrasound negative for DVT. Immunosuppressed ID consulted for assistance with HIV medication management holding ART therapy and continuing Dapson, weekly azithromycin and holding bactrim due to poor renal function. UA ordered given LISA showing few bacteria, no leukocytes, no protein and no nitrites. Dermatology consulted for psoriasis- rec holding methotrexate and follow up appt on 6/1. Rheum not recommending aspiration at this time. ID recommends PO Keflex to complete 7 day course. Will discharge patient on Keflex 500 mg BID for 5 more days.

## 2020-05-20 NOTE — H&P
[facility] Hospital Medicine  History & Physical    Patient Name: Andrew Velazquez  MRN: [id number]  Admission Date: 5/19/2020  Attending Physician: Gosia Vanegas MD   Primary Care Provider: Marisol Tejada MD    [facility] Medicine Team: [facility]OSP MED 5 Tanner Lovelace MD     Patient information was obtained from patient and ER records.     Subjective:     Principal Problem:Cellulitis of left lower extremity    Chief Complaint:   Chief Complaint   Patient presents with    Fatigue     arrived via  EMS with c/o left ankle pain and swelling, also c/o generalized weakness and decreased appetite x6 days        HPI: Patient is a 60 yo male with a pmh of AIDS (CD4 count in the 40s), COPD, HTN, polysubstance abuse, and CKD who presents with a chief complaint of lower left leg pain. He states the pain has been present for about 2 weeks, has been worsening, and is accompanied by swelling to the level of the ankle.  Pain is worst in foot/ankle region, and he rates it as a 6/10 at worst.   He denies any history of trauma.   He states the only associated symptom is decreased appetite. He denies diarrhea, headache, SOB, recent fever, cough, and abdominal pain.    Past Medical History:   Diagnosis Date    Arthritis     Back pain     Chronic kidney disease, stage 3     COPD (chronic obstructive pulmonary disease)     Esophageal reflux     HIV (human immunodeficiency virus infection)     2006    Hypertension     Lumbago     Psoriasis     Skin disorder     Tobacco use        Past Surgical History:   Procedure Laterality Date    FINGER SURGERY      TONSILLECTOMY         Review of patient's allergies indicates:   Allergen Reactions    Penicillins Hives    Nsaids (non-steroidal anti-inflammatory drug)      CKD    Sustiva [efavirenz] Other (See Comments)     insomnia       No current facility-administered medications on file prior to encounter.      Current Outpatient Medications on File Prior to  Encounter   Medication Sig    albuterol (PROVENTIL/VENTOLIN HFA) 90 mcg/actuation inhaler Inhale 1-2 puffs into the lungs every 6 (six) hours as needed for Wheezing (sob).    ferrous sulfate (FEOSOL) 325 mg (65 mg iron) Tab tablet Take 1 tablet (325 mg total) by mouth once daily.    folic acid (FOLVITE) 1 MG tablet Take 1 tablet (1 mg total) by mouth once daily.    hydroCHLOROthiazide (HYDRODIURIL) 25 MG tablet Take 1 tablet (25 mg total) by mouth once daily.    methotrexate 2.5 MG Tab Take 4 tablets by mouth every 7 days    naproxen (NAPROSYN) 500 MG tablet Take 500 mg by mouth 2 (two) times daily with meals.    zolpidem (AMBIEN) 10 mg Tab TAKE ONE TABLET BY MOUTH ONCE A DAY AT BEDTIME    aspirin (ECOTRIN) 81 MG EC tablet Take 1 tablet (81 mg total) by mouth once daily. (Patient taking differently: Take 81 mg by mouth 3 (three) times a week. )    azithromycin (ZITHROMAX) 600 MG Tab Take 2 tablets (1,200 mg total) by mouth every 7 days.    b complex vitamins tablet Take 1 tablet by mouth once daily.    betamethasone dipropionate (DIPROLENE) 0.05 % cream Apply topically 2 (two) times daily.    dapsone 100 MG Tab Take 1 tablet (100 mg total) by mouth once daily.    darunavir-lisa-emtri-tenof ala (SYMTUZA) 105-458-907-10 mg Tab Take 1 tablet by mouth once daily. Stop truvada and prezcobix    diphenhydrAMINE (BENADRYL) 25 mg capsule Take 1 capsule (25 mg total) by mouth every 6 (six) hours as needed for Itching or Allergies.    HYDROcodone-acetaminophen (NORCO) 7.5-325 mg per tablet Take 1 tablet by mouth every 6 (six) hours as needed for Pain. 90 to last 30 days.  Do not fill until 2020    metoprolol succinate (TOPROL-XL) 50 MG 24 hr tablet Take 1 tablet (50 mg total) by mouth once daily.    NIFEdipine (PROCARDIA-XL) 90 MG (OSM) 24 hr tablet Take 1 tablet (90 mg total) by mouth once daily.    pravastatin (PRAVACHOL) 40 MG tablet Take 1 tablet (40 mg total) by mouth every evening.    []  predniSONE (DELTASONE) 20 MG tablet Take 2 tablets (40 mg total) by mouth once daily. for 5 days    [DISCONTINUED] nebivolol (BYSTOLIC) 10 MG Tab Take 1 tablet (10 mg total) by mouth once daily.     Family History     Problem Relation (Age of Onset)    Arthritis Mother, Sister    Hypertension Mother, Sister    No Known Problems Father        Tobacco Use    Smoking status: Current Every Day Smoker     Packs/day: 0.25     Years: 40.00     Pack years: 10.00     Types: Cigarettes    Smokeless tobacco: Never Used   Substance and Sexual Activity    Alcohol use: Yes     Alcohol/week: 0.0 standard drinks     Comment: occassional beer; pt drank beer this am    Drug use: Yes     Types: Marijuana     Comment: occasional use of marijuana    Sexual activity: Never     Partners: Male     Birth control/protection: Condom     Review of Systems   Constitutional: Positive for fatigue and unexpected weight change. Negative for chills and fever.   HENT: Positive for dental problem. Negative for sore throat and trouble swallowing.    Eyes: Negative for photophobia and visual disturbance.   Respiratory: Negative for cough, chest tightness and shortness of breath.    Cardiovascular: Positive for leg swelling (Left LE to ankle). Negative for chest pain.   Gastrointestinal: Negative for abdominal distention, abdominal pain and diarrhea.   Genitourinary: Negative for difficulty urinating and dysuria.   Musculoskeletal: Negative for arthralgias and back pain.   Skin: Positive for rash.        Psoriasis present on all extremities     Allergic/Immunologic: Positive for immunocompromised state.   Neurological: Negative for dizziness, tremors, speech difficulty and light-headedness.   Psychiatric/Behavioral: Negative for agitation, behavioral problems and confusion.     Objective:     Vital Signs (Most Recent):  Temp: 97.9 °F (36.6 °C) (05/19/20 1920)  Pulse: 79 (05/19/20 1920)  Resp: 18 (05/19/20 1920)  BP: (!) 148/95 (05/19/20  1920)  SpO2: 97 % (05/19/20 1920) Vital Signs (24h Range):  Temp:  [97.9 °F (36.6 °C)-99.4 °F (37.4 °C)] 97.9 °F (36.6 °C)  Pulse:  [61-79] 79  Resp:  [18] 18  SpO2:  [97 %-98 %] 97 %  BP: (148-198)/(74-95) 148/95     Weight: 61.5 kg (135 lb 11.1 oz)  Body mass index is 20.04 kg/m².    Physical Exam   Constitutional: He is oriented to person, place, and time. He appears well-developed and well-nourished. He has a sickly appearance. No distress.   HENT:   Head: Normocephalic and atraumatic.   Eyes: Pupils are equal, round, and reactive to light. EOM are normal.   Neck: Normal range of motion. Neck supple.   Cardiovascular: Normal rate and regular rhythm.   Pulmonary/Chest: Effort normal and breath sounds normal. No respiratory distress.   Abdominal: Soft. Bowel sounds are normal. There is no tenderness.   Musculoskeletal: Normal range of motion. He exhibits edema (Severe swelling of left LE).   Neurological: He is alert and oriented to person, place, and time.   Skin: Skin is warm and dry. Rash noted. He is not diaphoretic.   Rash on lower arms and hands.  Patchy silverscally rash on trunk, mostly back.   Psychiatric: His speech is delayed. He is slowed. Cognition and memory are impaired. He is attentive.         CRANIAL NERVES     CN III, IV, VI   Pupils are equal, round, and reactive to light.  Extraocular motions are normal.        Significant Labs: All pertinent labs within the past 24 hours have been reviewed.    Significant Imaging: I have reviewed all pertinent imaging results/findings within the past 24 hours.    Assessment/Plan:     * Cellulitis of left lower extremity  -Clindamycin 600 mg IV q8h      LISA (acute kidney injury)  -asymptomatic and normal WBC; BUN/Cr 57/2.6  -fluids      HLD (hyperlipidemia)  Continued home pravastatin 40 mg      Psoriasis  -held once weekly methotrexate      HTN (hypertension)  -started home toprol 50 mg qd and nifedipine 90 mg qd  -held hydrochlorothiazide due to  LISA      AIDS (acquired immune deficiency syndrome)  -continued home dapsone  -started bactrim for opportunistic infection prophylaxis  - Patient on symtuza for AIDS/HIV at home- did not bring medication to the hospital and medicine not on formulary- will discuss options with ID         VTE Risk Mitigation (From admission, onward)         Ordered     heparin (porcine) injection 5,000 Units  Every 8 hours      05/19/20 1841     IP VTE HIGH RISK PATIENT  Once      05/19/20 1841     Place sequential compression device  Until discontinued      05/19/20 1841                   Tanner Lovelace MD  Department of Hospital Medicine   [facility]

## 2020-05-20 NOTE — PLAN OF CARE
Pt is resting in bed, no signs or verbal conformation from pt of any falls or injuries. Fall precautions where maintained during this shift. POC was reviewed with him and he verbalized understanding.

## 2020-05-20 NOTE — ASSESSMENT & PLAN NOTE
Subacute gout(uric acid  v psoriatic arthritis(unusual to be localized to single large joint only) v. HIV arthritis  -Clindamycin 600 mg IV q8h administered and DC'd  -Rheumatology consulted to assess foot and tap for fluid analysis.   -severe hyperuricemia- uric acid 10.3 on admit  -Blood culture with GPCs- Started empiric vancomycin administered   -Xray left ankle shows no acute process.   -US LLE negative for DVT

## 2020-05-20 NOTE — PLAN OF CARE
Safety measures maintained. VSS on RA. Pt had no complaints of pain throughout shift. Bed in lowest position, call light within reach, side rails up x2. Pt has no complaints at this time. Will continue to monitor.

## 2020-05-20 NOTE — PLAN OF CARE
"SW attempted to contact patient via cell phone and hospital room phone. No answer. Voicemail full on cell phone. SW contacted patient's sister who reported she could not assist with any information because she does not speak to her brother often and "Does not know what he has or not." KAREN obtained information from medical chart review. Patient may need a ride upon DC from hospital.     Marisol Tejada MD    [facility], [place], [place] - [address]  [address] [address]  Phone: [phone] Fax: [phone]       05/20/20 1222   Discharge Assessment   Assessment Type Discharge Planning Assessment   Assessment information obtained from? Medical Record   Expected Length of Stay (days) 2   Prior to hospitilization cognitive status: Alert/Oriented   Current cognitive status: Alert/Oriented   Current Functional Status: Independent;Assistive Equipment   Lives With alone   Able to Return to Prior Arrangements yes   Is patient able to care for self after discharge? Yes   Patient's perception of discharge disposition home or selfcare   Readmission Within the Last 30 Days no previous admission in last 30 days   Equipment Currently Used at Home none   Do you have any problems affording any of your prescribed medications? No   Is the patient taking medications as prescribed? yes   Does the patient receive services at the [facility]? No   Discharge Plan A Home   Discharge Plan B Home with family   DME Needed Upon Discharge    (TBD)     Maria L Tello LMSW  [facility]  [id number]    "

## 2020-05-20 NOTE — ASSESSMENT & PLAN NOTE
-continued home dapsone  -started bactrim for opportunistic infection prophylaxis  - Patient on symtuza for AIDS/HIV at home- did not bring medication to the hospital and medicine not on formulary- consulted immunocompromised ID, will discuss medication options and prophylaxis

## 2020-05-20 NOTE — ASSESSMENT & PLAN NOTE
-asymptomatic and normal WBC; BUN/Cr 57/2.6- trending down after fluid administration  -fluids  -UA ordered given LISA showing few bacteria, no leukocytes and no nitrites.

## 2020-05-20 NOTE — PROGRESS NOTES
Pharmacokinetic Initial Assessment: IV Vancomycin    Assessment/Plan:    Patient has LISA, but Scr is downtrending. Will pulse dose for now and give vancomycin ~20mg/kg load x1. Initiate intravenous vancomycin with loading dose of 1250 mg once with subsequent doses when random concentrations are less than 20 mcg/mL  Desired empiric serum trough concentration is 15 to 20 mcg/mL  Draw vancomycin random level on 5/20 at 0430.  Pharmacy will continue to follow and monitor vancomycin.      Please contact pharmacy at extension [phone] with any questions regarding this assessment.     Thank you for the consult,   Kostas Silver       Patient brief summary:  Andrew Velazquez is a 59 y.o. male initiated on antimicrobial therapy with IV Vancomycin for treatment of suspected bacteremia    Drug Allergies:   Review of patient's allergies indicates:   Allergen Reactions    Penicillins Hives    Nsaids (non-steroidal anti-inflammatory drug)      CKD    Sustiva [efavirenz] Other (See Comments)     insomnia       Actual Body Weight:   61.5kg    Renal Function:   Estimated Creatinine Clearance: 31.5 mL/min (A) (based on SCr of 2.2 mg/dL (H)).,     Dialysis Method (if applicable):  N/A    CBC (last 72 hours):  Recent Labs   Lab Result Units 05/19/20  1455 05/20/20  0446   WBC K/uL 4.78 7.54   Hemoglobin g/dL 13.5* 10.0*   Hematocrit % 41.5 30.5*   Platelets K/uL 171 220   Gran% % 75.8* 81.5*   Lymph% % 13.8* 11.1*   Mono% % 6.5 4.2   Eosinophil% % 1.0 0.9   Basophil% % 0.4 0.3   Differential Method  Automated Automated       Metabolic Panel (last 72 hours):  Recent Labs   Lab Result Units 05/19/20  1455 05/20/20  0446   Sodium mmol/L 135* 133*   Potassium mmol/L 3.5 3.6   Chloride mmol/L 101 102   CO2 mmol/L 23 20*   Glucose mg/dL 101 101   BUN, Bld mg/dL 57* 52*   Creatinine mg/dL 2.6* 2.2*   Albumin g/dL 2.4*  --    Total Bilirubin mg/dL 1.9*  --    Alkaline Phosphatase U/L 87  --    AST U/L 46*  --    ALT U/L 22  --        Drug levels  (last 3 results):  No results for input(s): VANCOMYCINRA, VANCOMYCINPE, VANCOMYCINTR in the last 72 hours.    Microbiologic Results:  Microbiology Results (last 7 days)       Procedure Component Value Units Date/Time    Blood culture #1 **CANNOT BE ORDERED STAT** [[id number]] Collected:  05/19/20 1455    Order Status:  Completed Specimen:  Blood from Peripheral, Forearm, Left Updated:  05/20/20 0336     Blood Culture, Routine Gram stain shruthi bottle: Gram positive cocci in chains resembling Strep      Results called to and read back by: Robyn Joe RN  05/20/2020  03:36    Blood culture #2 **CANNOT BE ORDERED STAT** [[id number]] Collected:  05/19/20 1515    Order Status:  Completed Specimen:  Blood from Peripheral, Forearm, Right Updated:  05/20/20 0115     Blood Culture, Routine No Growth to date

## 2020-05-20 NOTE — ASSESSMENT & PLAN NOTE
" 60 yo male with a pmh of HTN, HLD, CKD 3, human immunodeficiency virus (HIV) with acquired immune deficiency syndrome (AIDS (CD4 count in the 31)), psoriasis, polysubstance abuse diverticulosis, cigarette smoking with chronic obstructive pulmonary disease, history of left pontine lacunar stroke, gastroesophageal reflux disease, iron deficiency anemia, lumbago. His infectious disease doctor and PCP is Dr. Marisol Tejada.     He presents with a chief complaint of lower left leg pain. He states the pain has been present for about 2 weeks, has been worsening, and is accompanied by swelling to the level of the ankle.  Pain is worst in foot/ankle region, and he rates it as a 6/10 at worst.  He denies any history of trauma.    Pt has had multiple ED visits/admissions for LE pain/swelling every few months. He states that abx and steroid shot in the back side always help. Pain/swelling only occurs in the Left ankle/lower leg per pt. Denies any other jt involvement, no swelling or pain. Denies any new rashes although his known psoriasis diagnose 6 yrs ago has "flares" once a year or so. He sees derm at OSH and says he uses triamcinolone cream which usually helps. Last yr, he tried UV light therapy x 3 months. Pt is switching to [facility] derm and has upcoming appt end of this month. Labs with very elevated inflammatory markers ESR 89, .3. Uric acid 10.3. Last HIV ,615 and CD4 31 on 5/15. Blood cx + GPC resembling strep. Pt was placed on clindamycin for possible cellulitis and now switched to Vancomycin. U/S leg neg for DVT. Xray ordered.    Rheumatology consulted for L ankle swelling, possible gout.    Plan:  - differential dx infectious/septic joint/cellulitis with uncontrolled AIDS and blood cx with GPCs vs gout vs CPPD vs other  - f/u ID recs  - LE U/S neg for DVT. Pending xray foot  - L ankle with soft tissue swelling but will discuss with Dr. Rushing if able to get fluid sample. Caution with pt as possible " cellulitis over joint and do not want to introduce bacteria  - consider dermatology consult for psoriasis but pt has outpt derm appt coming up    Will discuss with Dr. Rushing. Staff attestation to follow.

## 2020-05-21 LAB
ANION GAP SERPL CALC-SCNC: 10 MMOL/L (ref 8–16)
BASOPHILS # BLD AUTO: 0.04 K/UL (ref 0–0.2)
BASOPHILS NFR BLD: 0.7 % (ref 0–1.9)
BUN SERPL-MCNC: 44 MG/DL (ref 6–20)
CALCIUM SERPL-MCNC: 8 MG/DL (ref 8.7–10.5)
CHLORIDE SERPL-SCNC: 107 MMOL/L (ref 95–110)
CO2 SERPL-SCNC: 18 MMOL/L (ref 23–29)
CREAT SERPL-MCNC: 1.9 MG/DL (ref 0.5–1.4)
DIFFERENTIAL METHOD: ABNORMAL
EOSINOPHIL # BLD AUTO: 0.2 K/UL (ref 0–0.5)
EOSINOPHIL NFR BLD: 4.3 % (ref 0–8)
ERYTHROCYTE [DISTWIDTH] IN BLOOD BY AUTOMATED COUNT: 13.3 % (ref 11.5–14.5)
EST. GFR  (AFRICAN AMERICAN): 43.6 ML/MIN/1.73 M^2
EST. GFR  (NON AFRICAN AMERICAN): 37.7 ML/MIN/1.73 M^2
GLUCOSE SERPL-MCNC: 93 MG/DL (ref 70–110)
HCT VFR BLD AUTO: 29.9 % (ref 40–54)
HGB BLD-MCNC: 9.5 G/DL (ref 14–18)
IMM GRANULOCYTES # BLD AUTO: 0.26 K/UL (ref 0–0.04)
IMM GRANULOCYTES NFR BLD AUTO: 4.8 % (ref 0–0.5)
LYMPHOCYTES # BLD AUTO: 0.6 K/UL (ref 1–4.8)
LYMPHOCYTES NFR BLD: 11.5 % (ref 18–48)
MCH RBC QN AUTO: 28.4 PG (ref 27–31)
MCHC RBC AUTO-ENTMCNC: 31.8 G/DL (ref 32–36)
MCV RBC AUTO: 90 FL (ref 82–98)
MONOCYTES # BLD AUTO: 0.3 K/UL (ref 0.3–1)
MONOCYTES NFR BLD: 5.2 % (ref 4–15)
NEUTROPHILS # BLD AUTO: 4 K/UL (ref 1.8–7.7)
NEUTROPHILS NFR BLD: 73.5 % (ref 38–73)
NRBC BLD-RTO: 0 /100 WBC
PLATELET # BLD AUTO: 293 K/UL (ref 150–350)
PMV BLD AUTO: 10.3 FL (ref 9.2–12.9)
POTASSIUM SERPL-SCNC: 4.4 MMOL/L (ref 3.5–5.1)
RBC # BLD AUTO: 3.34 M/UL (ref 4.6–6.2)
SODIUM SERPL-SCNC: 135 MMOL/L (ref 136–145)
VANCOMYCIN SERPL-MCNC: 13.1 UG/ML
WBC # BLD AUTO: 5.38 K/UL (ref 3.9–12.7)

## 2020-05-21 PROCEDURE — 99233 PR SUBSEQUENT HOSPITAL CARE,LEVL III: ICD-10-PCS | Mod: ,,, | Performed by: HOSPITALIST

## 2020-05-21 PROCEDURE — 36415 COLL VENOUS BLD VENIPUNCTURE: CPT

## 2020-05-21 PROCEDURE — 87040 BLOOD CULTURE FOR BACTERIA: CPT

## 2020-05-21 PROCEDURE — 63600175 PHARM REV CODE 636 W HCPCS: Performed by: STUDENT IN AN ORGANIZED HEALTH CARE EDUCATION/TRAINING PROGRAM

## 2020-05-21 PROCEDURE — 99233 SBSQ HOSP IP/OBS HIGH 50: CPT | Mod: ,,, | Performed by: HOSPITALIST

## 2020-05-21 PROCEDURE — 80048 BASIC METABOLIC PNL TOTAL CA: CPT

## 2020-05-21 PROCEDURE — 25000003 PHARM REV CODE 250: Performed by: STUDENT IN AN ORGANIZED HEALTH CARE EDUCATION/TRAINING PROGRAM

## 2020-05-21 PROCEDURE — 99233 PR SUBSEQUENT HOSPITAL CARE,LEVL III: ICD-10-PCS | Mod: ,,, | Performed by: INTERNAL MEDICINE

## 2020-05-21 PROCEDURE — 99232 SBSQ HOSP IP/OBS MODERATE 35: CPT | Mod: ,,, | Performed by: DERMATOLOGY

## 2020-05-21 PROCEDURE — 99232 PR SUBSEQUENT HOSPITAL CARE,LEVL II: ICD-10-PCS | Mod: ,,, | Performed by: DERMATOLOGY

## 2020-05-21 PROCEDURE — 63600175 PHARM REV CODE 636 W HCPCS: Performed by: HOSPITALIST

## 2020-05-21 PROCEDURE — 80202 ASSAY OF VANCOMYCIN: CPT

## 2020-05-21 PROCEDURE — 85025 COMPLETE CBC W/AUTO DIFF WBC: CPT

## 2020-05-21 PROCEDURE — 11000001 HC ACUTE MED/SURG PRIVATE ROOM

## 2020-05-21 PROCEDURE — 99233 SBSQ HOSP IP/OBS HIGH 50: CPT | Mod: ,,, | Performed by: INTERNAL MEDICINE

## 2020-05-21 RX ADMIN — DAPSONE 100 MG: 100 TABLET ORAL at 08:05

## 2020-05-21 RX ADMIN — FERROUS SULFATE TAB EC 325 MG (65 MG FE EQUIVALENT) 325 MG: 325 (65 FE) TABLET DELAYED RESPONSE at 08:05

## 2020-05-21 RX ADMIN — HEPARIN SODIUM 5000 UNITS: 5000 INJECTION INTRAVENOUS; SUBCUTANEOUS at 05:05

## 2020-05-21 RX ADMIN — FOLIC ACID 1 MG: 1 TABLET ORAL at 08:05

## 2020-05-21 RX ADMIN — HEPARIN SODIUM 5000 UNITS: 5000 INJECTION INTRAVENOUS; SUBCUTANEOUS at 02:05

## 2020-05-21 RX ADMIN — BETAMETHASONE VALERATE: 1.2 CREAM TOPICAL at 09:05

## 2020-05-21 RX ADMIN — ASPIRIN 81 MG: 81 TABLET, COATED ORAL at 08:05

## 2020-05-21 RX ADMIN — HEPARIN SODIUM 5000 UNITS: 5000 INJECTION INTRAVENOUS; SUBCUTANEOUS at 10:05

## 2020-05-21 RX ADMIN — SODIUM CHLORIDE 500 ML: 0.9 INJECTION, SOLUTION INTRAVENOUS at 03:05

## 2020-05-21 RX ADMIN — BETAMETHASONE VALERATE: 1.2 CREAM TOPICAL at 08:05

## 2020-05-21 RX ADMIN — NIFEDIPINE 90 MG: 30 TABLET, FILM COATED, EXTENDED RELEASE ORAL at 08:05

## 2020-05-21 RX ADMIN — METOPROLOL SUCCINATE 50 MG: 50 TABLET, EXTENDED RELEASE ORAL at 08:05

## 2020-05-21 RX ADMIN — PRAVASTATIN SODIUM 40 MG: 10 TABLET ORAL at 10:05

## 2020-05-21 RX ADMIN — VANCOMYCIN HYDROCHLORIDE 1250 MG: 1.25 INJECTION, POWDER, LYOPHILIZED, FOR SOLUTION INTRAVENOUS at 03:05

## 2020-05-21 NOTE — PROGRESS NOTES
[facility] Hospital Medicine  Progress Note    Patient Name: Andrew Velazquez  MRN: [id number]  Patient Class: IP- Inpatient   Admission Date: 5/19/2020  Length of Stay: 2 days  Attending Physician: Gosia Vanegas MD  Primary Care Provider: Marisol Tejada MD    [facility] Medicine Team: [facility] HOSP MED 5 Andre Rodriguez MD    Subjective:     Principal Problem:Pain and swelling of left lower extremity      HPI:  Patient is a 60 yo male with a pmh of AIDS (CD4 count in the 40s), COPD, HTN, polysubstance abuse, and CKD who presents with a chief complaint of lower left leg pain. He states the pain has been present for about 2 weeks, has been worsening, and is accompanied by swelling to the level of the ankle.  Pain is worst in foot/ankle region, and he rates it as a 6/10 at worst.   He denies any history of trauma.   He states the only associated symptom is decreased appetite. He denies diarrhea, headache, SOB, recent fever, cough, and abdominal pain.    Overview/Hospital Course:  Rheumatology consulted to assess foot and tap for fluid analysis but will not tap join due to risk of cellulitis. Blood cultures positive for GPCs- started empiric vancomycin. Xray left ankle shows no acute process. LLE ultrasound negative for DVT. Immunosuppressed ID consulted for assistance with HIV medication management holding ART therapy and continuing Dapson, weekly azithromycin and holding bactrim due to poor renal function. UA ordered given LISA showing few bacteria, no leukocytes, no protein and no nitrites. Dermatology consulted for psoriasis.     Interval History: NAEON, patient has good ROM and tenderness improved. No acute distress reported.     Review of Systems   Constitutional: Positive for unexpected weight change. Negative for chills, fatigue and fever.   HENT: Positive for dental problem. Negative for sore throat and trouble swallowing.    Eyes: Negative for photophobia and visual disturbance.   Respiratory:  Negative for cough, chest tightness and shortness of breath.    Cardiovascular: Positive for leg swelling (Left LE to ankle). Negative for chest pain.   Gastrointestinal: Negative for abdominal distention, abdominal pain and diarrhea.   Genitourinary: Negative for difficulty urinating and dysuria.   Musculoskeletal: Negative for arthralgias and back pain.   Skin: Positive for rash.        Psoriasis present on all extremities   Allergic/Immunologic: Positive for immunocompromised state.   Neurological: Negative for dizziness, tremors, speech difficulty and light-headedness.   Psychiatric/Behavioral: Negative for agitation, behavioral problems and confusion.     Objective:     Vital Signs (Most Recent):  Temp: 98.9 °F (37.2 °C) (05/21/20 1053)  Pulse: 61 (05/21/20 1053)  Resp: 18 (05/21/20 1053)  BP: 123/82 (05/21/20 1053)  SpO2: 98 % (05/21/20 1053) Vital Signs (24h Range):  Temp:  [97.3 °F (36.3 °C)-98.9 °F (37.2 °C)] 98.9 °F (37.2 °C)  Pulse:  [61-67] 61  Resp:  [16-18] 18  SpO2:  [98 %-99 %] 98 %  BP: (109-128)/(57-82) 123/82     Weight: 61.5 kg (135 lb 11.1 oz)  Body mass index is 20.04 kg/m².    Intake/Output Summary (Last 24 hours) at 5/21/2020 1346  Last data filed at 5/21/2020 1245  Gross per 24 hour   Intake 820 ml   Output 800 ml   Net 20 ml      Physical Exam   Constitutional: He is oriented to person, place, and time. He appears well-developed and well-nourished. He does not have a sickly appearance. No distress.   HENT:   Head: Normocephalic and atraumatic.   Eyes: Pupils are equal, round, and reactive to light. EOM are normal.   Neck: Normal range of motion. Neck supple.   Cardiovascular: Normal rate and regular rhythm.   Pulmonary/Chest: Effort normal and breath sounds normal. No respiratory distress.   Abdominal: Soft. Bowel sounds are normal. There is no tenderness.   Musculoskeletal: Normal range of motion. He exhibits edema (Severe swelling of left LE).   Neurological: He is alert and oriented to  person, place, and time.   Skin: Skin is warm and dry. Rash noted. He is not diaphoretic.   Rash on lower arms and hands.  Patchy silverscally rash on trunk, mostly back.   Psychiatric: He has a normal mood and affect.       Significant Labs:   Blood Culture:   Recent Labs   Lab 05/19/20  1455 05/19/20  1515   LABBLOO Gram stain shruthi bottle: Gram positive cocci in chains resembling Strep  Results called to and read back by: Robyn Joe RN  05/20/2020  03:36  STREPTOCOCCUS DYSGALACTIAE  Susceptibility pending  Beta-hemolytic streptococci are routinely susceptible to   penicillins,cephalosporins and carbapenems.  * No Growth to date  No Growth to date     CBC:   Recent Labs   Lab 05/19/20  1455 05/20/20  0446 05/21/20  0506   WBC 4.78 7.54 5.38   HGB 13.5* 10.0* 9.5*   HCT 41.5 30.5* 29.9*    220 293     CMP:   Recent Labs   Lab 05/19/20  1455 05/20/20  0446 05/21/20  0506   * 133* 135*   K 3.5 3.6 4.4    102 107   CO2 23 20* 18*    101 93   BUN 57* 52* 44*   CREATININE 2.6* 2.2* 1.9*   CALCIUM 8.5* 7.8* 8.0*   PROT 8.0  --   --    ALBUMIN 2.4*  --   --    BILITOT 1.9*  --   --    ALKPHOS 87  --   --    AST 46*  --   --    ALT 22  --   --    ANIONGAP 11 11 10   EGFRNONAA 25.8* 31.6* 37.7*     Urine Culture: No results for input(s): LABURIN in the last 48 hours.  Urine Studies:   Recent Labs   Lab 05/20/20  1340   COLORU Yellow   APPEARANCEUA Hazy*   PHUR 5.0   SPECGRAV 1.010   PROTEINUA Negative   GLUCUA 1+*   KETONESU Negative   BILIRUBINUA Negative   OCCULTUA 1+*   NITRITE Negative   LEUKOCYTESUR Negative   RBCUA 2   WBCUA 3   BACTERIA Few*   SQUAMEPITHEL 0     All pertinent labs within the past 24 hours have been reviewed.    Significant Imaging: I have reviewed all pertinent imaging results/findings within the past 24 hours.      Assessment/Plan:      * Pain and swelling of left lower extremity  Subacute gout(uric acid  v psoriatic arthritis(unusual to be localized to single large  joint only) v. HIV arthritis vs bacteremia from skin breakdown vs cellulitis     Plan  -Clindamycin 600 mg IV q8h administered and DC'd due to GPC bactermia 1/2 culture positive. Waiting on speciation.   -Continue Vancomycin   -Rheumatology consulted to assess foot and tap for fluid analysis not likely due to risk of cellulitis on skin.   -severe hyperuricemia- uric acid 10.3 on admit  -Blood culture with GPCs  -Xray left ankle shows no acute process.   -US LLE negative for DVT     LISA (acute kidney injury)  Likely prerenal etiology due to not feeling well and not eating well.  However, patient on many medications that can be affecting renal function. However does not have increase protein in urine.     Plan  -asymptomatic and normal WBC; BUN/Cr 57/2.6 on admission baseline at 1.3- trending down after fluid administration  -fluids and reassess   -UA ordered given LISA showing few bacteria, no leukocytes and no nitrites.     HLD (hyperlipidemia)  Continued home pravastatin 40 mg    Psoriasis  Patient with history of Psoriasis, visible and affecting patient on extremities. On Betamethosone cream daily.     - held once weekly methotrexate, unclear who is following his MTX therapy. Patient likely not complaint with his medications.   - Dermatology following     HTN (hypertension)  -started home toprol 50 mg qd and nifedipine 90 mg qd  -held hydrochlorothiazide due to LISA    AIDS (acquired immune deficiency syndrome)  CD for below 50 and elevated viral count. Not complaint with his medications. meant to be on darunavir/lisa/FTC/TAF but has not been taking it due to being unable to pick it up.    Plan  -continued home dapsone  -holding bactrim for opportunistic infection prophylaxis due to LISA  -Patient on symtuza for AIDS/HIV at home- did not bring medication to the hospital and medicine not on formulary- consulted immunocompromised ID, will discuss medication options and prophylaxis   -ID following and holding ART  therapy.           VTE Risk Mitigation (From admission, onward)         Ordered     heparin (porcine) injection 5,000 Units  Every 8 hours      05/19/20 1841     IP VTE HIGH RISK PATIENT  Once      05/19/20 1841     Place sequential compression device  Until discontinued      05/19/20 1841                BRADLEY Rodriguez MD  Department of Hospital Medicine   [facility]

## 2020-05-21 NOTE — PLAN OF CARE
Pts skin has remained intact during his stay and shows no signs or symptoms of infections. AAOx4 he had maintained fall precautions during this shift, he has no injuries or signs of falls.

## 2020-05-21 NOTE — ASSESSMENT & PLAN NOTE
60 yo male with a pmh of AIDS (CD4 count in the 40s), COPD, HTN, polysubstance abuse, and CKD who presents with a chief complaint of lower left leg pain. ID consulted to assist with ART    -he is not currently taking his ART  -would not start ART in this setting has he doesn't have an OI which would benefit from immediate ART  -continue vanco for cellulitis  -needs to establish follow up for HIV care. States that he sees a Dr. Tejada in [place] and she prescribed his ART

## 2020-05-21 NOTE — ASSESSMENT & PLAN NOTE
CD for below 50 and elevated viral count. Not complaint with his medications. meant to be on darunavir/lisa/FTC/TAF but has not been taking it due to being unable to pick it up.    Plan  -continued home dapsone  -holding bactrim for opportunistic infection prophylaxis due to LISA  -Patient on symtuza for AIDS/HIV at home- did not bring medication to the hospital and medicine not on formulary- consulted immunocompromised ID, will discuss medication options and prophylaxis   -ID following and holding ART therapy.

## 2020-05-21 NOTE — ASSESSMENT & PLAN NOTE
Patient with history of Psoriasis, visible and affecting patient on extremities. On Betamethosone cream daily.     - held once weekly methotrexate, unclear who is following his MTX therapy. Patient likely not complaint with his medications.   - Dermatology following

## 2020-05-21 NOTE — ASSESSMENT & PLAN NOTE
1. Psoriasis  Location per images : BUE palms and BLE   - Recommend starting triamcinolone ointment 0.1% BID to AA. Do not use on face, axilla or groin.  - Please continue to hold Methotrexate in setting of acute infection. Please hold until pt follow-up with Dermatology on 6/1/2020. Several co-morbidities increase patient's risk of side effects from MTX including alcohol use, CKD3, and HIV. Avoid concurrent Bactrim use with Methotrexate due to risk of bone marrow suppression.   - Agree with Vanc treatment of suspected cellulitis and bacteremia.   - Advise pt to keep f/u with Derm on 6/1/2020

## 2020-05-21 NOTE — CONSULTS
[facility]  Dermatology  Consult Note    Patient Name: Andrew Velazquez  MRN: [id number]  Admission Date: 5/19/2020  Hospital Length of Stay: 2 days  Attending Physician: Gosia Vanegas MD  Primary Care Provider: Marisol Tejada MD     Inpatient consult to Dermatology  Consult performed by: Gema Castro MD  Consult ordered by: Andre Rodriguez MD        Subjective:     Principal Problem:Pain and swelling of left lower extremity    HPI:  59 year old male with PMHx HIV with AIDs (CD4 count 31) , CKD3, COPD, psoriasis, polysubstance abuse, and tobacco admitted with GPC bacteremia and LLE swelling with concerns for cellulitis vs septic arthritis vs subacute gout. Dermatology consulted for psoriasis recommendations. Per chart review, pt diagnosed with psoriasis approximately 6 years ago and is currently on MTX 10 mg PO weekly as well as using triamcinolone cream. Pt was last dispensed MTX on 5/15/2020 but uncertain if pt consistently taking MTX. Prior treatments include nbUVB and TCS including betamethasone dipropionate ointment. Pt follows with [facility] but has upcoming appt on 6/1/2020 with [facility].    Past Medical History:   Diagnosis Date    Arthritis     Back pain     Chronic kidney disease, stage 3     COPD (chronic obstructive pulmonary disease)     Esophageal reflux     HIV (human immunodeficiency virus infection)     2006    Hypertension     Lumbago     Psoriasis     Skin disorder     Tobacco use        Past Surgical History:   Procedure Laterality Date    FINGER SURGERY      TONSILLECTOMY       Family History     Problem Relation (Age of Onset)    Arthritis Mother, Sister    Hypertension Mother, Sister    No Known Problems Father        Tobacco Use    Smoking status: Current Every Day Smoker     Packs/day: 0.25     Years: 40.00     Pack years: 10.00     Types: Cigarettes    Smokeless tobacco: Never Used   Substance and Sexual Activity    Alcohol use: Yes      Alcohol/week: 0.0 standard drinks     Comment: occassional beer; pt drank beer this am    Drug use: Yes     Types: Marijuana     Comment: occasional use of marijuana    Sexual activity: Never     Partners: Male     Birth control/protection: Condom       Review of patient's allergies indicates:   Allergen Reactions    Penicillins Hives    Nsaids (non-steroidal anti-inflammatory drug)      CKD    Sustiva [efavirenz] Other (See Comments)     insomnia       Medications:  Continuous Infusions:  Scheduled Meds:   aspirin  81 mg Oral Daily    betamethasone valerate 0.1%   Topical (Top) BID    dapsone  100 mg Oral Daily    ferrous sulfate  325 mg Oral Daily    folic acid  1 mg Oral Daily    heparin (porcine)  5,000 Units Subcutaneous Q8H    metoprolol succinate  50 mg Oral Daily    NIFEdipine  90 mg Oral Daily    pravastatin  40 mg Oral QHS    sodium chloride 0.9%  500 mL Intravenous Once    vancomycin (VANCOCIN) IVPB  1,250 mg Intravenous Q24H     PRN Meds:acetaminophen, albuterol sulfate, Dextrose 10% Bolus, Dextrose 10% Bolus, glucagon (human recombinant), glucose, glucose, ondansetron, promethazine, sodium chloride 0.9%, Pharmacy to dose Vancomycin consult **AND** vancomycin - pharmacy to dose    Review of Systems   Musculoskeletal: Positive for joint swelling and arthralgias.   All other systems reviewed and are negative.    Objective:     Vital Signs (Most Recent):  Temp: 98.9 °F (37.2 °C) (05/21/20 1053)  Pulse: 61 (05/21/20 1053)  Resp: 18 (05/21/20 1053)  BP: 123/82 (05/21/20 1053)  SpO2: 98 % (05/21/20 1053) Vital Signs (24h Range):  Temp:  [97.3 °F (36.3 °C)-98.9 °F (37.2 °C)] 98.9 °F (37.2 °C)  Pulse:  [61-67] 61  Resp:  [16-18] 18  SpO2:  [98 %-99 %] 98 %  BP: (116-128)/(61-82) 123/82     Weight: 61.5 kg (135 lb 11.1 oz)  Body mass index is 20.04 kg/m².    Physical Exam   Constitutional: He appears well-developed and well-nourished.   Skin:   Areas Examined (abnormalities noted in diagram):    RUE Inspected  LUE Inspection Performed  RLE Inspected  LLE Inspection Performed                       Significant Labs: All pertinent labs within the past 24 hours have been reviewed.    Significant Imaging: I have reviewed all pertinent imaging results/findings within the past 24 hours.    Assessment/Plan:   59 year old male with PMHx HIV with AIDs (CD4 count 31) , CKD3, COPD, psoriasis, polysubstance abuse, and tobacco admitted with GPC bacteremia and LLE swelling with concerns for cellulitis vs septic arthritis vs subacute gout. Dermatology consulted for psoriasis recommendations    Psoriasis  1. Psoriasis  Location per images : BUE palms and BLE   - Recommend starting triamcinolone ointment 0.1% BID to AA. Do not use on face, axilla or groin.  - Please continue to hold Methotrexate in setting of acute infection. Please hold until pt follow-up with Dermatology on 6/1/2020. Several co-morbidities increase patient's risk of side effects from MTX including alcohol use, CKD3, and HIV. Avoid concurrent Bactrim use with Methotrexate due to risk of bone marrow suppression.   - Agree with Vanc treatment of suspected cellulitis and bacteremia.   - Advise pt to keep f/u with Derm on 6/1/2020     Thank you for your consult. I will follow-up with patient. Please contact us if you have any additional questions.     Pt discussed with Dr. Sanz.    Gema Castro MD  Dermatology  [facility]

## 2020-05-21 NOTE — ASSESSMENT & PLAN NOTE
Subacute gout(uric acid  v psoriatic arthritis(unusual to be localized to single large joint only) v. HIV arthritis vs bacteremia from skin breakdown vs cellulitis   -Clindamycin 600 mg IV q8h administered and DC'd due to GPC bactermia 1/2 culture positive. Waiting on speciation.   -Continue Vancomycin   -Rheumatology consulted to assess foot and tap for fluid analysis not likely due to risk of cellulitis on skin.   -severe hyperuricemia- uric acid 10.3 on admit  -Blood culture with GPCs  -Xray left ankle shows no acute process.   -US LLE negative for DVT

## 2020-05-21 NOTE — SUBJECTIVE & OBJECTIVE
Past Medical History:   Diagnosis Date    Arthritis     Back pain     Chronic kidney disease, stage 3     COPD (chronic obstructive pulmonary disease)     Esophageal reflux     HIV (human immunodeficiency virus infection)     2006    Hypertension     Lumbago     Psoriasis     Skin disorder     Tobacco use        Past Surgical History:   Procedure Laterality Date    FINGER SURGERY      TONSILLECTOMY         Immunization History   Administered Date(s) Administered    Hepatitis B 03/25/2013, 05/10/2013, 12/17/2013    Hepatitis B, Adult 03/25/2013, 05/10/2013, 12/17/2013    Influenza 12/01/2016    Influenza - Quadrivalent - PF (6 months and older) 01/18/2013, 11/13/2013, 09/29/2017, 10/18/2018, 02/13/2020    Influenza - Trivalent (ADULT) 10/22/2014    Influenza - Trivalent - PF (ADULT) 01/18/2013, 11/13/2013    PPD Test 09/16/2009, 12/17/2013    Pneumococcal Conjugate - 13 Valent 06/29/2017    Pneumococcal Polysaccharide - 23 Valent 07/01/2012, 10/18/2018    Td (ADULT) 09/16/2009, 07/01/2012       Review of patient's allergies indicates:   Allergen Reactions    Penicillins Hives    Nsaids (non-steroidal anti-inflammatory drug)      CKD    Sustiva [efavirenz] Other (See Comments)     insomnia     Current Facility-Administered Medications   Medication Frequency    acetaminophen tablet 650 mg Q4H PRN    albuterol sulfate nebulizer solution 2.5 mg Q4H PRN    aspirin EC tablet 81 mg Daily    betamethasone valerate 0.1% cream BID    dapsone tablet 100 mg Daily    dextrose 10% (D10W) Bolus PRN    dextrose 10% (D10W) Bolus PRN    ferrous sulfate EC tablet 325 mg Daily    folic acid tablet 1 mg Daily    glucagon (human recombinant) injection 1 mg PRN    glucose chewable tablet 16 g PRN    glucose chewable tablet 24 g PRN    heparin (porcine) injection 5,000 Units Q8H    metoprolol succinate (TOPROL-XL) 24 hr tablet 50 mg Daily    NIFEdipine 24 hr tablet 90 mg Daily    ondansetron  disintegrating tablet 8 mg Q6H PRN    pravastatin tablet 40 mg QHS    promethazine tablet 12.5 mg Q6H PRN    sodium chloride 0.9% bolus 500 mL Once    sodium chloride 0.9% flush 10 mL PRN    vancomycin - pharmacy to dose pharmacy to manage frequency    vancomycin 1.25 g in dextrose 5% 250 mL IVPB (ready to mix) Q24H     Family History     Problem Relation (Age of Onset)    Arthritis Mother, Sister    Hypertension Mother, Sister    No Known Problems Father        Tobacco Use    Smoking status: Current Every Day Smoker     Packs/day: 0.25     Years: 40.00     Pack years: 10.00     Types: Cigarettes    Smokeless tobacco: Never Used   Substance and Sexual Activity    Alcohol use: Yes     Alcohol/week: 0.0 standard drinks     Comment: occassional beer; pt drank beer this am    Drug use: Yes     Types: Marijuana     Comment: occasional use of marijuana    Sexual activity: Never     Partners: Male     Birth control/protection: Condom     Review of Systems   Constitutional: Negative for fatigue and fever.   HENT: Negative for congestion, mouth sores and trouble swallowing.    Eyes: Negative for photophobia, pain, redness and visual disturbance.   Respiratory: Negative for cough and shortness of breath.    Cardiovascular: Negative for chest pain and palpitations.   Gastrointestinal: Negative for abdominal pain, constipation, diarrhea, nausea and vomiting.   Genitourinary: Negative for difficulty urinating and dysuria.   Musculoskeletal: Positive for arthralgias (improving) and joint swelling. Negative for back pain, gait problem, myalgias, neck pain and neck stiffness.   Skin: Positive for rash. Negative for pallor.   Neurological: Negative for dizziness, weakness and headaches.   Hematological: Negative for adenopathy. Does not bruise/bleed easily.   Psychiatric/Behavioral: Negative for agitation and confusion.     Objective:     Vital Signs (Most Recent):  Temp: 98.9 °F (37.2 °C) (05/21/20 1053)  Pulse: 61  (05/21/20 1053)  Resp: 18 (05/21/20 1053)  BP: 123/82 (05/21/20 1053)  SpO2: 98 % (05/21/20 1053)  O2 Device (Oxygen Therapy): room air (05/21/20 0903) Vital Signs (24h Range):  Temp:  [97.3 °F (36.3 °C)-98.9 °F (37.2 °C)] 98.9 °F (37.2 °C)  Pulse:  [61-67] 61  Resp:  [16-18] 18  SpO2:  [98 %-99 %] 98 %  BP: (109-128)/(57-82) 123/82     Weight: 61.5 kg (135 lb 11.1 oz) (05/19/20 1920)  Body mass index is 20.04 kg/m².  Body surface area is 1.73 meters squared.      Intake/Output Summary (Last 24 hours) at 5/21/2020 1408  Last data filed at 5/21/2020 1245  Gross per 24 hour   Intake 820 ml   Output 800 ml   Net 20 ml       Physical Exam   Vitals reviewed.  Constitutional: He is oriented to person, place, and time and well-developed, well-nourished, and in no distress. No distress.   HENT:   Head: Normocephalic and atraumatic.   Right Ear: External ear normal.   Left Ear: External ear normal.   Nose: Nose normal.   Mouth/Throat: No oropharyngeal exudate.   Poor dentition   Eyes: Conjunctivae and EOM are normal. Pupils are equal, round, and reactive to light. Right eye exhibits no discharge. Left eye exhibits no discharge. No scleral icterus.   Neck: Neck supple. No thyromegaly present.   Cardiovascular: Normal rate, regular rhythm, normal heart sounds and intact distal pulses.    No murmur heard.  Pulmonary/Chest: Effort normal and breath sounds normal. No respiratory distress. He has no wheezes. He has no rales. He exhibits no tenderness.   Abdominal: Soft. Bowel sounds are normal. He exhibits no distension. There is no tenderness. There is no guarding.   Neurological: He is alert and oriented to person, place, and time.   Skin: Skin is warm and dry. Rash noted. He is not diaphoretic. No erythema.     Psychiatric: Mood and affect normal.   Musculoskeletal: Normal range of motion. He exhibits edema and tenderness. He exhibits no deformity.                     Significant Labs:  All pertinent lab results from the last  24 hours have been reviewed.    Significant Imaging:  Imaging results within the past 24 hours have been reviewed.

## 2020-05-21 NOTE — SUBJECTIVE & OBJECTIVE
Interval History: NAEON, patient has good ROM and tenderness improved. No acute distress reported.     Review of Systems   Constitutional: Positive for unexpected weight change. Negative for chills, fatigue and fever.   HENT: Positive for dental problem. Negative for sore throat and trouble swallowing.    Eyes: Negative for photophobia and visual disturbance.   Respiratory: Negative for cough, chest tightness and shortness of breath.    Cardiovascular: Positive for leg swelling (Left LE to ankle). Negative for chest pain.   Gastrointestinal: Negative for abdominal distention, abdominal pain and diarrhea.   Genitourinary: Negative for difficulty urinating and dysuria.   Musculoskeletal: Negative for arthralgias and back pain.   Skin: Positive for rash.        Psoriasis present on all extremities   Allergic/Immunologic: Positive for immunocompromised state.   Neurological: Negative for dizziness, tremors, speech difficulty and light-headedness.   Psychiatric/Behavioral: Negative for agitation, behavioral problems and confusion.     Objective:     Vital Signs (Most Recent):  Temp: 98.9 °F (37.2 °C) (05/21/20 1053)  Pulse: 61 (05/21/20 1053)  Resp: 18 (05/21/20 1053)  BP: 123/82 (05/21/20 1053)  SpO2: 98 % (05/21/20 1053) Vital Signs (24h Range):  Temp:  [97.3 °F (36.3 °C)-98.9 °F (37.2 °C)] 98.9 °F (37.2 °C)  Pulse:  [61-67] 61  Resp:  [16-18] 18  SpO2:  [98 %-99 %] 98 %  BP: (109-128)/(57-82) 123/82     Weight: 61.5 kg (135 lb 11.1 oz)  Body mass index is 20.04 kg/m².    Intake/Output Summary (Last 24 hours) at 5/21/2020 1346  Last data filed at 5/21/2020 1245  Gross per 24 hour   Intake 820 ml   Output 800 ml   Net 20 ml      Physical Exam   Constitutional: He is oriented to person, place, and time. He appears well-developed and well-nourished. He does not have a sickly appearance. No distress.   HENT:   Head: Normocephalic and atraumatic.   Eyes: Pupils are equal, round, and reactive to light. EOM are normal.    Neck: Normal range of motion. Neck supple.   Cardiovascular: Normal rate and regular rhythm.   Pulmonary/Chest: Effort normal and breath sounds normal. No respiratory distress.   Abdominal: Soft. Bowel sounds are normal. There is no tenderness.   Musculoskeletal: Normal range of motion. He exhibits edema (Severe swelling of left LE).   Neurological: He is alert and oriented to person, place, and time.   Skin: Skin is warm and dry. Rash noted. He is not diaphoretic.   Rash on lower arms and hands.  Patchy silverscally rash on trunk, mostly back.   Psychiatric: He has a normal mood and affect.       Significant Labs:   Blood Culture:   Recent Labs   Lab 05/19/20  1455 05/19/20  1515   LABBLOO Gram stain shruthi bottle: Gram positive cocci in chains resembling Strep  Results called to and read back by: Robyn Joe RN  05/20/2020  03:36  STREPTOCOCCUS DYSGALACTIAE  Susceptibility pending  Beta-hemolytic streptococci are routinely susceptible to   penicillins,cephalosporins and carbapenems.  * No Growth to date  No Growth to date     CBC:   Recent Labs   Lab 05/19/20  1455 05/20/20  0446 05/21/20  0506   WBC 4.78 7.54 5.38   HGB 13.5* 10.0* 9.5*   HCT 41.5 30.5* 29.9*    220 293     CMP:   Recent Labs   Lab 05/19/20  1455 05/20/20  0446 05/21/20  0506   * 133* 135*   K 3.5 3.6 4.4    102 107   CO2 23 20* 18*    101 93   BUN 57* 52* 44*   CREATININE 2.6* 2.2* 1.9*   CALCIUM 8.5* 7.8* 8.0*   PROT 8.0  --   --    ALBUMIN 2.4*  --   --    BILITOT 1.9*  --   --    ALKPHOS 87  --   --    AST 46*  --   --    ALT 22  --   --    ANIONGAP 11 11 10   EGFRNONAA 25.8* 31.6* 37.7*     Urine Culture: No results for input(s): LABURIN in the last 48 hours.  Urine Studies:   Recent Labs   Lab 05/20/20  1340   COLORU Yellow   APPEARANCEUA Hazy*   PHUR 5.0   SPECGRAV 1.010   PROTEINUA Negative   GLUCUA 1+*   KETONESU Negative   BILIRUBINUA Negative   OCCULTUA 1+*   NITRITE Negative   LEUKOCYTESUR Negative    RBCUA 2   WBCUA 3   BACTERIA Few*   SQUAMEPITHEL 0     All pertinent labs within the past 24 hours have been reviewed.    Significant Imaging: I have reviewed all pertinent imaging results/findings within the past 24 hours.

## 2020-05-21 NOTE — HPI
60 yo male with a pmh of AIDS (CD4 count in the 40s), COPD, HTN, polysubstance abuse, and CKD who presents with a chief complaint of lower left leg pain. He states the pain has been present for about 2 weeks, has been worsening, and is accompanied by swelling to the level of the ankle.  Pain is worst in foot/ankle region, and he rates it as a 6/10 at worst.   He denies any history of trauma.   He states the only associated symptom is decreased appetite. He denies diarrhea, headache, SOB, recent fever, cough, and abdominal pain.    He is meant to be on darunavir/lisa/FTC/TAF but has not been taking it due to being unable to pick it up

## 2020-05-21 NOTE — ASSESSMENT & PLAN NOTE
"58 yo male with a pmh of HTN, HLD, CKD 3, human immunodeficiency virus (HIV) with acquired immune deficiency syndrome (AIDS (CD4 count in the 31)), psoriasis, polysubstance abuse diverticulosis, cigarette smoking with chronic obstructive pulmonary disease, history of left pontine lacunar stroke, gastroesophageal reflux disease, iron deficiency anemia, lumbago. His infectious disease doctor and PCP is Dr. Marisol Tejada.      He presents with a chief complaint of lower left leg pain. He states the pain has been present for about 2 weeks, has been worsening, and is accompanied by swelling to the level of the ankle.  Pain is worst in foot/ankle region, and he rates it as a 6/10 at worst.  He denies any history of trauma.     Pt has had multiple ED visits/admissions for LE pain/swelling every few months. He states that abx and steroid shot in the back side always help. Pain/swelling only occurs in the Left ankle/lower leg per pt. Denies any other jt involvement, no swelling or pain. Denies any new rashes although his known psoriasis diagnose 6 yrs ago has "flares" once a year or so. He sees derm at [facility] and says he uses triamcinolone cream which usually helps. Last yr, he tried UV light therapy x 3 months. Pt is switching to [facility] and has upcoming appt end of this month. Labs with very elevated inflammatory markers ESR 89, .3. Uric acid 10.3. Last HIV ,615 and CD4 31 on 5/15. Blood cx + strep dysgalactaie. Pt was placed on clindamycin for possible cellulitis and now switched to Vancomycin. U/S leg neg for DVT. Xray unremarkable.     Rheumatology consulted for L ankle swelling, possible gout.     Plan:  - differential dx infectious/septic joint/cellulitis with uncontrolled AIDS and blood cx with GPCs vs gout vs CPPD vs other  - f/u ID recs  - LE U/S neg for DVT. xray foot unremarkable  - L ankle with soft tissue swelling, no joint effusion. Risk of aspiration outweighs benefit. Low suspicion for gout  - " dermatology consulted for psoriasis, f/u recs     Will discuss with Dr. Rushing. Staff attestation to follow.

## 2020-05-21 NOTE — PLAN OF CARE
Pt resting in bed in NAD. VSS throughout this shift. No safety issues this shift. Pt has not asked for anything for pain this shift.

## 2020-05-21 NOTE — PROGRESS NOTES
[facility]  Rheumatology  Progress Note    Patient Name: Andrew Velazquez  MRN: [id number]  Admission Date: 5/19/2020  Hospital Length of Stay: 2 days  Code Status: Prior   Attending Provider: Gosia Vanegas MD  Primary Care Physician: Marisol Tejada MD  Principal Problem: Pain and swelling of left lower extremity    Subjective:     HPI: Mr. Andrew Velazquez, 58 yo male with a pmh of HTN, HLD, CKD 3, human immunodeficiency virus (HIV) with acquired immune deficiency syndrome (AIDS (CD4 count in the 31)), psoriasis, polysubstance abuse diverticulosis, cigarette smoking with chronic obstructive pulmonary disease, history of left pontine lacunar stroke, gastroesophageal reflux disease, iron deficiency anemia, lumbago. He lives in [place], [place]. His infectious disease doctor is Dr. Marisol Tejada.     He presents with a chief complaint of lower left leg pain. He states the pain has been present for about 2 weeks, has been worsening, and is accompanied by swelling to the level of the ankle.  Pain is worst in foot/ankle region, and he rates it as a 6/10 at worst.  He denies any history of trauma.   He states the only associated symptom is decreased appetite. He denies diarrhea, headache, SOB, recent fever, cough, and abdominal pain.    Pt had similar presentation 1/2020.He presented to [facility] emergency department on 1/11/20 with right lower extremity pain from the thigh to ankle associated with erythema and edema, with no preceding trauma. He reported similar symptoms of his left lower extremity a few months ago. Labs showed elevated WBC count (40774), elevated sed rate (>120), elevated CRP (250). X-ray of his leg showed mild soft tissue swelling at the ankle. Venous ultrasound showed no deep venous thrombosis. He was given clindamycin in the emergency department. He was admitted to [facility] for observation. He was given IV clindamycin overnight. He had very dry  "skin of his feet and toes and very thickened toenails, which he was advised can easily lead to cellulitis. He was discharged the next day with prescriptions for 6 days of clindamycin and topical mupirocin.      Pt has had multiple ED visits/admissions for LE pain/swelling every few months. Pt states his PCP is his ID doctor. States he is compliant with his HIV meds and has only been out since Friday. He states that abx and steroid shot in the back side always help. Pain/swelling only occurs in the Left ankle/lower leg per pt. Denies any other jt involvement, no swelling or pain. Denies any new rashes although his known psoriasis diagnose 6 yrs ago has "flares" once a year or so. He sees derm at [facility] and says he uses triamcinolone cream which usually helps. Last yr, he tried UV light therapy x 3 months. Pt is switching to [facility] and has upcoming appt end of this month. Labs with very elevated inflammatory markers ESR 89, .3. Uric acid 10.3. Last HIV ,615 and CD4 31 on 5/15. Blood cx + GPC resembling strep. Pt was placed on clindamycin for possible cellulitis and now switched to Vancomycin. U/S leg neg for DVT. Xray ordered.    Rheumatology consulted for L ankle swelling, possible gout.    Past Medical History:   Diagnosis Date    Arthritis     Back pain     Chronic kidney disease, stage 3     COPD (chronic obstructive pulmonary disease)     Esophageal reflux     HIV (human immunodeficiency virus infection)     2006    Hypertension     Lumbago     Psoriasis     Skin disorder     Tobacco use        Past Surgical History:   Procedure Laterality Date    FINGER SURGERY      TONSILLECTOMY         Immunization History   Administered Date(s) Administered    Hepatitis B 03/25/2013, 05/10/2013, 12/17/2013    Hepatitis B, Adult 03/25/2013, 05/10/2013, 12/17/2013    Influenza 12/01/2016    Influenza - Quadrivalent - PF (6 months and older) 01/18/2013, 11/13/2013, 09/29/2017, 10/18/2018, " 02/13/2020    Influenza - Trivalent (ADULT) 10/22/2014    Influenza - Trivalent - PF (ADULT) 01/18/2013, 11/13/2013    PPD Test 09/16/2009, 12/17/2013    Pneumococcal Conjugate - 13 Valent 06/29/2017    Pneumococcal Polysaccharide - 23 Valent 07/01/2012, 10/18/2018    Td (ADULT) 09/16/2009, 07/01/2012       Review of patient's allergies indicates:   Allergen Reactions    Penicillins Hives    Nsaids (non-steroidal anti-inflammatory drug)      CKD    Sustiva [efavirenz] Other (See Comments)     insomnia     Current Facility-Administered Medications   Medication Frequency    acetaminophen tablet 650 mg Q4H PRN    albuterol sulfate nebulizer solution 2.5 mg Q4H PRN    aspirin EC tablet 81 mg Daily    betamethasone valerate 0.1% cream BID    dapsone tablet 100 mg Daily    dextrose 10% (D10W) Bolus PRN    dextrose 10% (D10W) Bolus PRN    ferrous sulfate EC tablet 325 mg Daily    folic acid tablet 1 mg Daily    glucagon (human recombinant) injection 1 mg PRN    glucose chewable tablet 16 g PRN    glucose chewable tablet 24 g PRN    heparin (porcine) injection 5,000 Units Q8H    metoprolol succinate (TOPROL-XL) 24 hr tablet 50 mg Daily    NIFEdipine 24 hr tablet 90 mg Daily    ondansetron disintegrating tablet 8 mg Q6H PRN    pravastatin tablet 40 mg QHS    promethazine tablet 12.5 mg Q6H PRN    sodium chloride 0.9% bolus 500 mL Once    sodium chloride 0.9% flush 10 mL PRN    vancomycin - pharmacy to dose pharmacy to manage frequency    vancomycin 1.25 g in dextrose 5% 250 mL IVPB (ready to mix) Q24H     Family History     Problem Relation (Age of Onset)    Arthritis Mother, Sister    Hypertension Mother, Sister    No Known Problems Father        Tobacco Use    Smoking status: Current Every Day Smoker     Packs/day: 0.25     Years: 40.00     Pack years: 10.00     Types: Cigarettes    Smokeless tobacco: Never Used   Substance and Sexual Activity    Alcohol use: Yes     Alcohol/week: 0.0  standard drinks     Comment: occassional beer; pt drank beer this am    Drug use: Yes     Types: Marijuana     Comment: occasional use of marijuana    Sexual activity: Never     Partners: Male     Birth control/protection: Condom     Review of Systems   Constitutional: Negative for fatigue and fever.   HENT: Negative for congestion, mouth sores and trouble swallowing.    Eyes: Negative for photophobia, pain, redness and visual disturbance.   Respiratory: Negative for cough and shortness of breath.    Cardiovascular: Negative for chest pain and palpitations.   Gastrointestinal: Negative for abdominal pain, constipation, diarrhea, nausea and vomiting.   Genitourinary: Negative for difficulty urinating and dysuria.   Musculoskeletal: Positive for arthralgias (improving) and joint swelling. Negative for back pain, gait problem, myalgias, neck pain and neck stiffness.   Skin: Positive for rash. Negative for pallor.   Neurological: Negative for dizziness, weakness and headaches.   Hematological: Negative for adenopathy. Does not bruise/bleed easily.   Psychiatric/Behavioral: Negative for agitation and confusion.     Objective:     Vital Signs (Most Recent):  Temp: 98.9 °F (37.2 °C) (05/21/20 1053)  Pulse: 61 (05/21/20 1053)  Resp: 18 (05/21/20 1053)  BP: 123/82 (05/21/20 1053)  SpO2: 98 % (05/21/20 1053)  O2 Device (Oxygen Therapy): room air (05/21/20 0903) Vital Signs (24h Range):  Temp:  [97.3 °F (36.3 °C)-98.9 °F (37.2 °C)] 98.9 °F (37.2 °C)  Pulse:  [61-67] 61  Resp:  [16-18] 18  SpO2:  [98 %-99 %] 98 %  BP: (109-128)/(57-82) 123/82     Weight: 61.5 kg (135 lb 11.1 oz) (05/19/20 1920)  Body mass index is 20.04 kg/m².  Body surface area is 1.73 meters squared.      Intake/Output Summary (Last 24 hours) at 5/21/2020 1408  Last data filed at 5/21/2020 1245  Gross per 24 hour   Intake 820 ml   Output 800 ml   Net 20 ml       Physical Exam   Vitals reviewed.  Constitutional: He is oriented to person, place, and time and  well-developed, well-nourished, and in no distress. No distress.   HENT:   Head: Normocephalic and atraumatic.   Right Ear: External ear normal.   Left Ear: External ear normal.   Nose: Nose normal.   Mouth/Throat: No oropharyngeal exudate.   Poor dentition   Eyes: Conjunctivae and EOM are normal. Pupils are equal, round, and reactive to light. Right eye exhibits no discharge. Left eye exhibits no discharge. No scleral icterus.   Neck: Neck supple. No thyromegaly present.   Cardiovascular: Normal rate, regular rhythm, normal heart sounds and intact distal pulses.    No murmur heard.  Pulmonary/Chest: Effort normal and breath sounds normal. No respiratory distress. He has no wheezes. He has no rales. He exhibits no tenderness.   Abdominal: Soft. Bowel sounds are normal. He exhibits no distension. There is no tenderness. There is no guarding.   Neurological: He is alert and oriented to person, place, and time.   Skin: Skin is warm and dry. Rash noted. He is not diaphoretic. No erythema.     Psychiatric: Mood and affect normal.   Musculoskeletal: Normal range of motion. He exhibits edema (improved) and tenderness (improved). Can see wrinkles now, edema improving. He exhibits no deformity.                     Significant Labs:  All pertinent lab results from the last 24 hours have been reviewed.    Significant Imaging:  Imaging results within the past 24 hours have been reviewed.    Assessment/Plan:     * Pain and swelling of left lower extremity  60 yo male with a pmh of HTN, HLD, CKD 3, human immunodeficiency virus (HIV) with acquired immune deficiency syndrome (AIDS (CD4 count in the 31)), psoriasis, polysubstance abuse diverticulosis, cigarette smoking with chronic obstructive pulmonary disease, history of left pontine lacunar stroke, gastroesophageal reflux disease, iron deficiency anemia, lumbago. His infectious disease doctor and PCP is Dr. Marisol Tejada.      He presents with a chief complaint of lower left leg  "pain. He states the pain has been present for about 2 weeks, has been worsening, and is accompanied by swelling to the level of the ankle.  Pain is worst in foot/ankle region, and he rates it as a 6/10 at worst.  He denies any history of trauma.     Pt has had multiple ED visits/admissions for LE pain/swelling every few months. He states that abx and steroid shot in the back side always help. Pain/swelling only occurs in the Left ankle/lower leg per pt. Denies any other jt involvement, no swelling or pain. Denies any new rashes although his known psoriasis diagnose 6 yrs ago has "flares" once a year or so. He sees derm at [facility] and says he uses triamcinolone cream which usually helps. Last yr, he tried UV light therapy x 3 months. Pt is switching to [facility] and has upcoming appt end of this month. Labs with very elevated inflammatory markers ESR 89, .3. Uric acid 10.3. Last HIV ,615 and CD4 31 on 5/15. Blood cx + strep dysgalactaie. Pt was placed on clindamycin for possible cellulitis and now switched to Vancomycin. U/S leg neg for DVT. Xray unremarkable.     Rheumatology consulted for L ankle swelling, possible gout.     Plan:  - differential dx infectious/septic joint/cellulitis with uncontrolled AIDS and blood cx with Strep vs gout vs CPPD vs other  - L ankle with soft tissue swelling, no joint effusion. Risk of aspiration outweighs benefit. Low suspicion for gout  - f/u ID recs  - dermatology consulted for psoriasis, f/u recs     Discussed with Dr. Rushing. Will sign off. Staff attestation to follow.        Jayne Sandoval MD  Rheumatology  [facility]        I have personally taken the history and examined the patient and agree with the fellow's note as stated above with the following exceptions:    The left leg is less warm, less tender, less swollen with some wrinkling of the skin, with decreased pitting edema  The left ankle shows no effusion, redness or disproportionate " warmth. Active and passive rom of the left ankle is normal      Resolving cellulitis  No evidence of ankle arthritis: no evidence of septic arthritis, PsA or gout.  Cont vancomycin IV   Await Derm consult for psoriasis  Re-consult prn

## 2020-05-21 NOTE — ASSESSMENT & PLAN NOTE
Likely prerenal etiology due to not feeling well and not eating well.  However, patient on many medications that can be affecting renal function. However does not have increase protein in urine.     Plan  -asymptomatic and normal WBC; BUN/Cr 57/2.6 on admission baseline at 1.3- trending down after fluid administration  -fluids and reassess   -UA ordered given LISA showing few bacteria, no leukocytes and no nitrites.

## 2020-05-21 NOTE — HPI
59 year old male with PMHx HIV with AIDs (CD4 count 31) , CKD3, COPD, psoriasis, polysubstance abuse, and tobacco admitted with GPC bacteremia and LLE swelling with concerns for cellulitis vs septic arthritis vs subacute gout. Dermatology consulted for psoriasis recommendations. Per chart review, pt diagnosed with psoriasis approximately 6 years ago and is currently on MTX 10 mg PO weekly as well as using triamcinolone cream. Pt was last dispensed MTX on 5/15/2020 but uncertain if pt consistently taking MTX. Prior treatments include nbUVB and TCS including betamethasone dipropionate ointment. Pt follows [facility] but has upcoming appt on 6/1/2020 with [facility] D[facility].

## 2020-05-21 NOTE — CONSULTS
[facility]  Infectious Disease  Consult Note    Patient Name: Andrew Velazquez  MRN: [id number]  Admission Date: 5/19/2020  Hospital Length of Stay: 2 days  Attending Physician: Gosia Vanegas MD  Primary Care Provider: Marisol Tejada MD     Isolation Status: No active isolations    Patient information was obtained from patient and past medical records.      Inpatient consult to Infectious Diseases  Consult performed by: Mohamud Koroma MD  Consult ordered by: Tanner Lovelace MD  Reason for consult: HIV        Assessment/Plan:     AIDS (acquired immune deficiency syndrome)  60 yo male with a pmh of AIDS (CD4 count in the 40s), COPD, HTN, polysubstance abuse, and CKD who presents with a chief complaint of lower left leg pain. ID consulted to assist with ART    -he is not currently taking his ART  -would not start ART in this setting has he doesn't have an OI which would benefit from immediate ART  -continue vanco for cellulitis  -needs to establish follow up for HIV care. States that he sees a Dr. Tejada in [place] and she prescribed his ART          Thank you for your consult. I will follow-up with patient. Please contact us if you have any additional questions.    Mohamud Koroma MD  Infectious Disease  [facility]    Subjective:     Principal Problem: Pain and swelling of left lower extremity    HPI: 60 yo male with a pmh of AIDS (CD4 count in the 40s), COPD, HTN, polysubstance abuse, and CKD who presents with a chief complaint of lower left leg pain. He states the pain has been present for about 2 weeks, has been worsening, and is accompanied by swelling to the level of the ankle.  Pain is worst in foot/ankle region, and he rates it as a 6/10 at worst.   He denies any history of trauma.   He states the only associated symptom is decreased appetite. He denies diarrhea, headache, SOB, recent fever, cough, and abdominal pain.    He is meant to be on darunavir/lisa/FTC/TAF but has  not been taking it due to being unable to pick it up    Past Medical History:   Diagnosis Date    Arthritis     Back pain     Chronic kidney disease, stage 3     COPD (chronic obstructive pulmonary disease)     Esophageal reflux     HIV (human immunodeficiency virus infection)     2006    Hypertension     Lumbago     Psoriasis     Skin disorder     Tobacco use        Past Surgical History:   Procedure Laterality Date    FINGER SURGERY      TONSILLECTOMY         Review of patient's allergies indicates:   Allergen Reactions    Penicillins Hives    Nsaids (non-steroidal anti-inflammatory drug)      CKD    Sustiva [efavirenz] Other (See Comments)     insomnia       Medications:  Medications Prior to Admission   Medication Sig    albuterol (PROVENTIL/VENTOLIN HFA) 90 mcg/actuation inhaler Inhale 1-2 puffs into the lungs every 6 (six) hours as needed for Wheezing (sob).    ferrous sulfate (FEOSOL) 325 mg (65 mg iron) Tab tablet Take 1 tablet (325 mg total) by mouth once daily.    folic acid (FOLVITE) 1 MG tablet Take 1 tablet (1 mg total) by mouth once daily.    hydroCHLOROthiazide (HYDRODIURIL) 25 MG tablet Take 1 tablet (25 mg total) by mouth once daily.    methotrexate 2.5 MG Tab Take 4 tablets by mouth every 7 days    naproxen (NAPROSYN) 500 MG tablet Take 500 mg by mouth 2 (two) times daily with meals.    zolpidem (AMBIEN) 10 mg Tab TAKE ONE TABLET BY MOUTH ONCE A DAY AT BEDTIME    aspirin (ECOTRIN) 81 MG EC tablet Take 1 tablet (81 mg total) by mouth once daily. (Patient taking differently: Take 81 mg by mouth 3 (three) times a week. )    azithromycin (ZITHROMAX) 600 MG Tab Take 2 tablets (1,200 mg total) by mouth every 7 days.    b complex vitamins tablet Take 1 tablet by mouth once daily.    betamethasone dipropionate (DIPROLENE) 0.05 % cream Apply topically 2 (two) times daily.    dapsone 100 MG Tab Take 1 tablet (100 mg total) by mouth once daily.    darunavir-lisa-emtri-tenof ala  (SYMTUZA) [phone] mg Tab Take 1 tablet by mouth once daily. Stop truvada and prezcobix    diphenhydrAMINE (BENADRYL) 25 mg capsule Take 1 capsule (25 mg total) by mouth every 6 (six) hours as needed for Itching or Allergies.    HYDROcodone-acetaminophen (NORCO) 7.5-325 mg per tablet Take 1 tablet by mouth every 6 (six) hours as needed for Pain. 90 to last 30 days.  Do not fill until 2020    metoprolol succinate (TOPROL-XL) 50 MG 24 hr tablet Take 1 tablet (50 mg total) by mouth once daily.    NIFEdipine (PROCARDIA-XL) 90 MG (OSM) 24 hr tablet Take 1 tablet (90 mg total) by mouth once daily.    pravastatin (PRAVACHOL) 40 MG tablet Take 1 tablet (40 mg total) by mouth every evening.    [] predniSONE (DELTASONE) 20 MG tablet Take 2 tablets (40 mg total) by mouth once daily. for 5 days     Antibiotics (From admission, onward)    Start     Stop Route Frequency Ordered    20 1500  vancomycin 1.25 g in dextrose 5% 250 mL IVPB (ready to mix)      -- IV Every 24 hours (non-standard times) 20 0926    20 1426  vancomycin - pharmacy to dose  (vancomycin IVPB)      -- IV pharmacy to manage frequency 20 1326    20 0900  dapsone tablet 100 mg      -- Oral Daily 20 1918        Antifungals (From admission, onward)    None        Antivirals (From admission, onward)    None           Immunization History   Administered Date(s) Administered    Hepatitis B 2013, 05/10/2013, 2013    Hepatitis B, Adult 2013, 05/10/2013, 2013    Influenza 2016    Influenza - Quadrivalent - PF (6 months and older) 2013, 2013, 2017, 10/18/2018, 2020    Influenza - Trivalent (ADULT) 10/22/2014    Influenza - Trivalent - PF (ADULT) 2013, 2013    PPD Test 2009, 2013    Pneumococcal Conjugate - 13 Valent 2017    Pneumococcal Polysaccharide - 23 Valent 2012, 10/18/2018    Td (ADULT) 2009, 2012        Family History     Problem Relation (Age of Onset)    Arthritis Mother, Sister    Hypertension Mother, Sister    No Known Problems Father        Social History     Socioeconomic History    Marital status:      Spouse name: Not on file    Number of children: Not on file    Years of education: 14    Highest education level: Not on file   Occupational History    Not on file   Social Needs    Financial resource strain: Not on file    Food insecurity:     Worry: Not on file     Inability: Not on file    Transportation needs:     Medical: Not on file     Non-medical: Not on file   Tobacco Use    Smoking status: Current Every Day Smoker     Packs/day: 0.25     Years: 40.00     Pack years: 10.00     Types: Cigarettes    Smokeless tobacco: Never Used   Substance and Sexual Activity    Alcohol use: Yes     Alcohol/week: 0.0 standard drinks     Comment: occassional beer; pt drank beer this am    Drug use: Yes     Types: Marijuana     Comment: occasional use of marijuana    Sexual activity: Never     Partners: Male     Birth control/protection: Condom   Lifestyle    Physical activity:     Days per week: Not on file     Minutes per session: Not on file    Stress: Not on file   Relationships    Social connections:     Talks on phone: Not on file     Gets together: Not on file     Attends Buddhist service: Not on file     Active member of club or organization: Not on file     Attends meetings of clubs or organizations: Not on file     Relationship status: Not on file   Other Topics Concern    Not on file   Social History Narrative    Not on file     Review of Systems   Constitutional: Positive for appetite change, fatigue and unexpected weight change. Negative for chills and fever.   HENT: Positive for dental problem. Negative for sore throat and trouble swallowing.    Eyes: Negative for photophobia and visual disturbance.   Respiratory: Negative for cough, chest tightness and shortness of breath.     Cardiovascular: Positive for leg swelling (Left LE to ankle). Negative for chest pain.   Gastrointestinal: Negative for abdominal distention, abdominal pain and diarrhea.   Genitourinary: Negative for difficulty urinating and dysuria.   Musculoskeletal: Negative for arthralgias and back pain.   Skin: Positive for rash.        Psoriasis present on all extremities   Allergic/Immunologic: Positive for immunocompromised state.   Neurological: Negative for dizziness, tremors, speech difficulty and light-headedness.   Psychiatric/Behavioral: Negative for agitation, behavioral problems and confusion.     Objective:     Vital Signs (Most Recent):  Temp: 98.9 °F (37.2 °C) (05/21/20 1053)  Pulse: 61 (05/21/20 1053)  Resp: 18 (05/21/20 1053)  BP: 123/82 (05/21/20 1053)  SpO2: 98 % (05/21/20 1053) Vital Signs (24h Range):  Temp:  [97.3 °F (36.3 °C)-98.9 °F (37.2 °C)] 98.9 °F (37.2 °C)  Pulse:  [61-67] 61  Resp:  [16-18] 18  SpO2:  [98 %-99 %] 98 %  BP: (109-128)/(57-82) 123/82     Weight: 61.5 kg (135 lb 11.1 oz)  Body mass index is 20.04 kg/m².    Estimated Creatinine Clearance: 36.5 mL/min (A) (based on SCr of 1.9 mg/dL (H)).    Physical Exam   Constitutional: He is oriented to person, place, and time. He appears well-developed and well-nourished. He has a sickly appearance. No distress.   HENT:   Head: Normocephalic and atraumatic.   Eyes: Pupils are equal, round, and reactive to light. EOM are normal.   Neck: Normal range of motion. Neck supple.   Cardiovascular: Normal rate and regular rhythm.   Pulmonary/Chest: Effort normal and breath sounds normal. No respiratory distress.   Abdominal: Soft. Bowel sounds are normal. There is no tenderness.   Musculoskeletal: Normal range of motion. He exhibits edema (Severe swelling of left LE).   Neurological: He is alert and oriented to person, place, and time.   Skin: Skin is warm and dry. Rash noted. He is not diaphoretic.   Rash on lower arms and hands.  Patchy silverscally rash  on trunk, mostly back.   Psychiatric: His speech is delayed. He is slowed. Cognition and memory are impaired. He is attentive.       Significant Labs: All pertinent labs within the past 24 hours have been reviewed.    Significant Imaging: I have reviewed all pertinent imaging results/findings within the past 24 hours.

## 2020-05-21 NOTE — SUBJECTIVE & OBJECTIVE
Past Medical History:   Diagnosis Date    Arthritis     Back pain     Chronic kidney disease, stage 3     COPD (chronic obstructive pulmonary disease)     Esophageal reflux     HIV (human immunodeficiency virus infection)     2006    Hypertension     Lumbago     Psoriasis     Skin disorder     Tobacco use        Past Surgical History:   Procedure Laterality Date    FINGER SURGERY      TONSILLECTOMY         Review of patient's allergies indicates:   Allergen Reactions    Penicillins Hives    Nsaids (non-steroidal anti-inflammatory drug)      CKD    Sustiva [efavirenz] Other (See Comments)     insomnia       Medications:  Medications Prior to Admission   Medication Sig    albuterol (PROVENTIL/VENTOLIN HFA) 90 mcg/actuation inhaler Inhale 1-2 puffs into the lungs every 6 (six) hours as needed for Wheezing (sob).    ferrous sulfate (FEOSOL) 325 mg (65 mg iron) Tab tablet Take 1 tablet (325 mg total) by mouth once daily.    folic acid (FOLVITE) 1 MG tablet Take 1 tablet (1 mg total) by mouth once daily.    hydroCHLOROthiazide (HYDRODIURIL) 25 MG tablet Take 1 tablet (25 mg total) by mouth once daily.    methotrexate 2.5 MG Tab Take 4 tablets by mouth every 7 days    naproxen (NAPROSYN) 500 MG tablet Take 500 mg by mouth 2 (two) times daily with meals.    zolpidem (AMBIEN) 10 mg Tab TAKE ONE TABLET BY MOUTH ONCE A DAY AT BEDTIME    aspirin (ECOTRIN) 81 MG EC tablet Take 1 tablet (81 mg total) by mouth once daily. (Patient taking differently: Take 81 mg by mouth 3 (three) times a week. )    azithromycin (ZITHROMAX) 600 MG Tab Take 2 tablets (1,200 mg total) by mouth every 7 days.    b complex vitamins tablet Take 1 tablet by mouth once daily.    betamethasone dipropionate (DIPROLENE) 0.05 % cream Apply topically 2 (two) times daily.    dapsone 100 MG Tab Take 1 tablet (100 mg total) by mouth once daily.    darunavir-lisa-emtri-tenof ala (SYMTUZA) [phone] mg Tab Take 1 tablet by mouth  once daily. Stop truvada and prezcobix    diphenhydrAMINE (BENADRYL) 25 mg capsule Take 1 capsule (25 mg total) by mouth every 6 (six) hours as needed for Itching or Allergies.    HYDROcodone-acetaminophen (NORCO) 7.5-325 mg per tablet Take 1 tablet by mouth every 6 (six) hours as needed for Pain. 90 to last 30 days.  Do not fill until 2020    metoprolol succinate (TOPROL-XL) 50 MG 24 hr tablet Take 1 tablet (50 mg total) by mouth once daily.    NIFEdipine (PROCARDIA-XL) 90 MG (OSM) 24 hr tablet Take 1 tablet (90 mg total) by mouth once daily.    pravastatin (PRAVACHOL) 40 MG tablet Take 1 tablet (40 mg total) by mouth every evening.    [] predniSONE (DELTASONE) 20 MG tablet Take 2 tablets (40 mg total) by mouth once daily. for 5 days     Antibiotics (From admission, onward)    Start     Stop Route Frequency Ordered    20 1500  vancomycin 1.25 g in dextrose 5% 250 mL IVPB (ready to mix)      -- IV Every 24 hours (non-standard times) 20 0926    20 1426  vancomycin - pharmacy to dose  (vancomycin IVPB)      -- IV pharmacy to manage frequency 20 1326    20 0900  dapsone tablet 100 mg      -- Oral Daily 20 1918        Antifungals (From admission, onward)    None        Antivirals (From admission, onward)    None           Immunization History   Administered Date(s) Administered    Hepatitis B 2013, 05/10/2013, 2013    Hepatitis B, Adult 2013, 05/10/2013, 2013    Influenza 2016    Influenza - Quadrivalent - PF (6 months and older) 2013, 2013, 2017, 10/18/2018, 2020    Influenza - Trivalent (ADULT) 10/22/2014    Influenza - Trivalent - PF (ADULT) 2013, 2013    PPD Test 2009, 2013    Pneumococcal Conjugate - 13 Valent 2017    Pneumococcal Polysaccharide - 23 Valent 2012, 10/18/2018    Td (ADULT) 2009, 2012       Family History     Problem Relation (Age of  Onset)    Arthritis Mother, Sister    Hypertension Mother, Sister    No Known Problems Father        Social History     Socioeconomic History    Marital status:      Spouse name: Not on file    Number of children: Not on file    Years of education: 14    Highest education level: Not on file   Occupational History    Not on file   Social Needs    Financial resource strain: Not on file    Food insecurity:     Worry: Not on file     Inability: Not on file    Transportation needs:     Medical: Not on file     Non-medical: Not on file   Tobacco Use    Smoking status: Current Every Day Smoker     Packs/day: 0.25     Years: 40.00     Pack years: 10.00     Types: Cigarettes    Smokeless tobacco: Never Used   Substance and Sexual Activity    Alcohol use: Yes     Alcohol/week: 0.0 standard drinks     Comment: occassional beer; pt drank beer this am    Drug use: Yes     Types: Marijuana     Comment: occasional use of marijuana    Sexual activity: Never     Partners: Male     Birth control/protection: Condom   Lifestyle    Physical activity:     Days per week: Not on file     Minutes per session: Not on file    Stress: Not on file   Relationships    Social connections:     Talks on phone: Not on file     Gets together: Not on file     Attends Muslim service: Not on file     Active member of club or organization: Not on file     Attends meetings of clubs or organizations: Not on file     Relationship status: Not on file   Other Topics Concern    Not on file   Social History Narrative    Not on file     Review of Systems   Constitutional: Positive for appetite change, fatigue and unexpected weight change. Negative for chills and fever.   HENT: Positive for dental problem. Negative for sore throat and trouble swallowing.    Eyes: Negative for photophobia and visual disturbance.   Respiratory: Negative for cough, chest tightness and shortness of breath.    Cardiovascular: Positive for leg swelling (Left  LE to ankle). Negative for chest pain.   Gastrointestinal: Negative for abdominal distention, abdominal pain and diarrhea.   Genitourinary: Negative for difficulty urinating and dysuria.   Musculoskeletal: Negative for arthralgias and back pain.   Skin: Positive for rash.        Psoriasis present on all extremities   Allergic/Immunologic: Positive for immunocompromised state.   Neurological: Negative for dizziness, tremors, speech difficulty and light-headedness.   Psychiatric/Behavioral: Negative for agitation, behavioral problems and confusion.     Objective:     Vital Signs (Most Recent):  Temp: 98.9 °F (37.2 °C) (05/21/20 1053)  Pulse: 61 (05/21/20 1053)  Resp: 18 (05/21/20 1053)  BP: 123/82 (05/21/20 1053)  SpO2: 98 % (05/21/20 1053) Vital Signs (24h Range):  Temp:  [97.3 °F (36.3 °C)-98.9 °F (37.2 °C)] 98.9 °F (37.2 °C)  Pulse:  [61-67] 61  Resp:  [16-18] 18  SpO2:  [98 %-99 %] 98 %  BP: (109-128)/(57-82) 123/82     Weight: 61.5 kg (135 lb 11.1 oz)  Body mass index is 20.04 kg/m².    Estimated Creatinine Clearance: 36.5 mL/min (A) (based on SCr of 1.9 mg/dL (H)).    Physical Exam   Constitutional: He is oriented to person, place, and time. He appears well-developed and well-nourished. He has a sickly appearance. No distress.   HENT:   Head: Normocephalic and atraumatic.   Eyes: Pupils are equal, round, and reactive to light. EOM are normal.   Neck: Normal range of motion. Neck supple.   Cardiovascular: Normal rate and regular rhythm.   Pulmonary/Chest: Effort normal and breath sounds normal. No respiratory distress.   Abdominal: Soft. Bowel sounds are normal. There is no tenderness.   Musculoskeletal: Normal range of motion. He exhibits edema (Severe swelling of left LE).   Neurological: He is alert and oriented to person, place, and time.   Skin: Skin is warm and dry. Rash noted. He is not diaphoretic.   Rash on lower arms and hands.  Patchy silverscally rash on trunk, mostly back.   Psychiatric: His speech  is delayed. He is slowed. Cognition and memory are impaired. He is attentive.       Significant Labs: All pertinent labs within the past 24 hours have been reviewed.    Significant Imaging: I have reviewed all pertinent imaging results/findings within the past 24 hours.

## 2020-05-21 NOTE — SUBJECTIVE & OBJECTIVE
Past Medical History:   Diagnosis Date    Arthritis     Back pain     Chronic kidney disease, stage 3     COPD (chronic obstructive pulmonary disease)     Esophageal reflux     HIV (human immunodeficiency virus infection)     2006    Hypertension     Lumbago     Psoriasis     Skin disorder     Tobacco use        Past Surgical History:   Procedure Laterality Date    FINGER SURGERY      TONSILLECTOMY       Family History     Problem Relation (Age of Onset)    Arthritis Mother, Sister    Hypertension Mother, Sister    No Known Problems Father        Tobacco Use    Smoking status: Current Every Day Smoker     Packs/day: 0.25     Years: 40.00     Pack years: 10.00     Types: Cigarettes    Smokeless tobacco: Never Used   Substance and Sexual Activity    Alcohol use: Yes     Alcohol/week: 0.0 standard drinks     Comment: occassional beer; pt drank beer this am    Drug use: Yes     Types: Marijuana     Comment: occasional use of marijuana    Sexual activity: Never     Partners: Male     Birth control/protection: Condom       Review of patient's allergies indicates:   Allergen Reactions    Penicillins Hives    Nsaids (non-steroidal anti-inflammatory drug)      CKD    Sustiva [efavirenz] Other (See Comments)     insomnia       Medications:  Continuous Infusions:  Scheduled Meds:   aspirin  81 mg Oral Daily    betamethasone valerate 0.1%   Topical (Top) BID    dapsone  100 mg Oral Daily    ferrous sulfate  325 mg Oral Daily    folic acid  1 mg Oral Daily    heparin (porcine)  5,000 Units Subcutaneous Q8H    metoprolol succinate  50 mg Oral Daily    NIFEdipine  90 mg Oral Daily    pravastatin  40 mg Oral QHS    sodium chloride 0.9%  500 mL Intravenous Once    vancomycin (VANCOCIN) IVPB  1,250 mg Intravenous Q24H     PRN Meds:acetaminophen, albuterol sulfate, Dextrose 10% Bolus, Dextrose 10% Bolus, glucagon (human recombinant), glucose, glucose, ondansetron, promethazine, sodium chloride 0.9%,  Pharmacy to dose Vancomycin consult **AND** vancomycin - pharmacy to dose    Review of Systems   Musculoskeletal: Positive for joint swelling and arthralgias.   All other systems reviewed and are negative.    Objective:     Vital Signs (Most Recent):  Temp: 98.9 °F (37.2 °C) (05/21/20 1053)  Pulse: 61 (05/21/20 1053)  Resp: 18 (05/21/20 1053)  BP: 123/82 (05/21/20 1053)  SpO2: 98 % (05/21/20 1053) Vital Signs (24h Range):  Temp:  [97.3 °F (36.3 °C)-98.9 °F (37.2 °C)] 98.9 °F (37.2 °C)  Pulse:  [61-67] 61  Resp:  [16-18] 18  SpO2:  [98 %-99 %] 98 %  BP: (116-128)/(61-82) 123/82     Weight: 61.5 kg (135 lb 11.1 oz)  Body mass index is 20.04 kg/m².    Physical Exam   Constitutional: He appears well-developed and well-nourished.   Skin:   Areas Examined (abnormalities noted in diagram):   RUE Inspected  LUE Inspection Performed  RLE Inspected  LLE Inspection Performed                       Significant Labs: All pertinent labs within the past 24 hours have been reviewed.    Significant Imaging: I have reviewed all pertinent imaging results/findings within the past 24 hours.

## 2020-05-21 NOTE — CONSULTS
Pharmacokinetic Assessment Follow Up: IV Vancomycin    Vancomycin serum concentration assessment(s):    The random level was drawn correctly and can be used to guide therapy at this time. The measurement is slightly below the desired definitive target range of 15 to 20 mcg/mL.    Vancomycin Regimen Plan:    Continue regimen to Vancomycin 1250 mg IV every 24 hours with next serum trough concentration measured at 1400 prior to 3rd dose on 5/22    Drug levels (last 3 results):  Recent Labs   Lab Result Units 05/21/20  0506   Vancomycin, Random ug/mL 13.1       Pharmacy will continue to follow and monitor vancomycin.    Please contact pharmacy at extension [phone] for questions regarding this assessment.    Thank you for the consult,   Dora Jennings       Patient brief summary:  Andrew Velazquez is a 59 y.o. male initiated on antimicrobial therapy with IV Vancomycin for treatment of bacteremia      Drug Allergies:   Review of patient's allergies indicates:   Allergen Reactions    Penicillins Hives    Nsaids (non-steroidal anti-inflammatory drug)      CKD    Sustiva [efavirenz] Other (See Comments)     insomnia       Actual Body Weight:   61.5 kg    Renal Function:   Estimated Creatinine Clearance: 36.5 mL/min (A) (based on SCr of 1.9 mg/dL (H)).,     Dialysis Method (if applicable):  N/A    CBC (last 72 hours):  Recent Labs   Lab Result Units 05/19/20  1455 05/20/20  0446 05/21/20  0506   WBC K/uL 4.78 7.54 5.38   Hemoglobin g/dL 13.5* 10.0* 9.5*   Hematocrit % 41.5 30.5* 29.9*   Platelets K/uL 171 220 293   Gran% % 75.8* 81.5* 73.5*   Lymph% % 13.8* 11.1* 11.5*   Mono% % 6.5 4.2 5.2   Eosinophil% % 1.0 0.9 4.3   Basophil% % 0.4 0.3 0.7   Differential Method  Automated Automated Automated       Metabolic Panel (last 72 hours):  Recent Labs   Lab Result Units 05/19/20  1455 05/20/20  0446 05/20/20  1340 05/21/20  0506   Sodium mmol/L 135* 133*  --  135*   Potassium mmol/L 3.5 3.6  --  4.4   Chloride mmol/L 101 102  --   107   CO2 mmol/L 23 20*  --  18*   Glucose mg/dL 101 101  --  93   Glucose, UA   --   --  1+*  --    BUN, Bld mg/dL 57* 52*  --  44*   Creatinine mg/dL 2.6* 2.2*  --  1.9*   Albumin g/dL 2.4*  --   --   --    Total Bilirubin mg/dL 1.9*  --   --   --    Alkaline Phosphatase U/L 87  --   --   --    AST U/L 46*  --   --   --    ALT U/L 22  --   --   --        Vancomycin Administrations:  vancomycin given in the last 96 hours                   vancomycin 1.25 g in dextrose 5% 250 mL IVPB (ready to mix) (mg) 1,250 mg New Bag 05/20/20 1510                Microbiologic Results:  Microbiology Results (last 7 days)     Procedure Component Value Units Date/Time    Blood culture [[id number]] Collected:  05/21/20 0506    Order Status:  Sent Specimen:  Blood Updated:  05/21/20 0517    Blood culture #2 **CANNOT BE ORDERED STAT** [[id number]] Collected:  05/19/20 1515    Order Status:  Completed Specimen:  Blood from Peripheral, Forearm, Right Updated:  05/20/20 1612     Blood Culture, Routine No Growth to date      No Growth to date    Blood culture #1 **CANNOT BE ORDERED STAT** [[id number]] Collected:  05/19/20 1455    Order Status:  Completed Specimen:  Blood from Peripheral, Forearm, Left Updated:  05/20/20 0336     Blood Culture, Routine Gram stain shruthi bottle: Gram positive cocci in chains resembling Strep      Results called to and read back by: Robyn Joe RN  05/20/2020  03:36

## 2020-05-22 LAB
ANION GAP SERPL CALC-SCNC: 7 MMOL/L (ref 8–16)
ANISOCYTOSIS BLD QL SMEAR: SLIGHT
BACTERIA BLD CULT: ABNORMAL
BASOPHILS # BLD AUTO: ABNORMAL K/UL (ref 0–0.2)
BASOPHILS NFR BLD: 0 % (ref 0–1.9)
BUN SERPL-MCNC: 33 MG/DL (ref 6–20)
CALCIUM SERPL-MCNC: 7.8 MG/DL (ref 8.7–10.5)
CHLORIDE SERPL-SCNC: 109 MMOL/L (ref 95–110)
CO2 SERPL-SCNC: 21 MMOL/L (ref 23–29)
CREAT SERPL-MCNC: 1.7 MG/DL (ref 0.5–1.4)
DIFFERENTIAL METHOD: ABNORMAL
EOSINOPHIL # BLD AUTO: ABNORMAL K/UL (ref 0–0.5)
EOSINOPHIL NFR BLD: 12 % (ref 0–8)
ERYTHROCYTE [DISTWIDTH] IN BLOOD BY AUTOMATED COUNT: 13.6 % (ref 11.5–14.5)
EST. GFR  (AFRICAN AMERICAN): 49.9 ML/MIN/1.73 M^2
EST. GFR  (NON AFRICAN AMERICAN): 43.2 ML/MIN/1.73 M^2
GLUCOSE SERPL-MCNC: 85 MG/DL (ref 70–110)
HCT VFR BLD AUTO: 29.8 % (ref 40–54)
HGB BLD-MCNC: 9.3 G/DL (ref 14–18)
HYPOCHROMIA BLD QL SMEAR: ABNORMAL
IMM GRANULOCYTES # BLD AUTO: ABNORMAL K/UL (ref 0–0.04)
IMM GRANULOCYTES NFR BLD AUTO: ABNORMAL % (ref 0–0.5)
LYMPHOCYTES # BLD AUTO: ABNORMAL K/UL (ref 1–4.8)
LYMPHOCYTES NFR BLD: 23 % (ref 18–48)
MCH RBC QN AUTO: 28.5 PG (ref 27–31)
MCHC RBC AUTO-ENTMCNC: 31.2 G/DL (ref 32–36)
MCV RBC AUTO: 91 FL (ref 82–98)
MONOCYTES # BLD AUTO: ABNORMAL K/UL (ref 0.3–1)
MONOCYTES NFR BLD: 7 % (ref 4–15)
MYELOCYTES NFR BLD MANUAL: 1 %
NEUTROPHILS NFR BLD: 57 % (ref 38–73)
NRBC BLD-RTO: 0 /100 WBC
OVALOCYTES BLD QL SMEAR: ABNORMAL
PLATELET # BLD AUTO: 341 K/UL (ref 150–350)
PLATELET BLD QL SMEAR: ABNORMAL
PMV BLD AUTO: 10.1 FL (ref 9.2–12.9)
POIKILOCYTOSIS BLD QL SMEAR: SLIGHT
POLYCHROMASIA BLD QL SMEAR: ABNORMAL
POTASSIUM SERPL-SCNC: 4.2 MMOL/L (ref 3.5–5.1)
RBC # BLD AUTO: 3.26 M/UL (ref 4.6–6.2)
SODIUM SERPL-SCNC: 137 MMOL/L (ref 136–145)
TARGETS BLD QL SMEAR: ABNORMAL
WBC # BLD AUTO: 4.87 K/UL (ref 3.9–12.7)

## 2020-05-22 PROCEDURE — 25000003 PHARM REV CODE 250: Performed by: STUDENT IN AN ORGANIZED HEALTH CARE EDUCATION/TRAINING PROGRAM

## 2020-05-22 PROCEDURE — 85007 BL SMEAR W/DIFF WBC COUNT: CPT

## 2020-05-22 PROCEDURE — 11000001 HC ACUTE MED/SURG PRIVATE ROOM

## 2020-05-22 PROCEDURE — 63600175 PHARM REV CODE 636 W HCPCS: Performed by: STUDENT IN AN ORGANIZED HEALTH CARE EDUCATION/TRAINING PROGRAM

## 2020-05-22 PROCEDURE — 36415 COLL VENOUS BLD VENIPUNCTURE: CPT

## 2020-05-22 PROCEDURE — 99232 PR SUBSEQUENT HOSPITAL CARE,LEVL II: ICD-10-PCS | Mod: ,,, | Performed by: HOSPITALIST

## 2020-05-22 PROCEDURE — 80048 BASIC METABOLIC PNL TOTAL CA: CPT

## 2020-05-22 PROCEDURE — 99232 SBSQ HOSP IP/OBS MODERATE 35: CPT | Mod: ,,, | Performed by: HOSPITALIST

## 2020-05-22 PROCEDURE — 85027 COMPLETE CBC AUTOMATED: CPT

## 2020-05-22 RX ORDER — CEFTRIAXONE 1 G/1
1 INJECTION, POWDER, FOR SOLUTION INTRAMUSCULAR; INTRAVENOUS
Status: DISCONTINUED | OUTPATIENT
Start: 2020-05-22 | End: 2020-05-23

## 2020-05-22 RX ADMIN — NIFEDIPINE 90 MG: 30 TABLET, FILM COATED, EXTENDED RELEASE ORAL at 10:05

## 2020-05-22 RX ADMIN — CEFTRIAXONE SODIUM 1 G: 1 INJECTION, POWDER, FOR SOLUTION INTRAMUSCULAR; INTRAVENOUS at 03:05

## 2020-05-22 RX ADMIN — HEPARIN SODIUM 5000 UNITS: 5000 INJECTION INTRAVENOUS; SUBCUTANEOUS at 08:05

## 2020-05-22 RX ADMIN — PRAVASTATIN SODIUM 40 MG: 10 TABLET ORAL at 08:05

## 2020-05-22 RX ADMIN — METOPROLOL SUCCINATE 50 MG: 50 TABLET, EXTENDED RELEASE ORAL at 10:05

## 2020-05-22 RX ADMIN — ASPIRIN 81 MG: 81 TABLET, COATED ORAL at 10:05

## 2020-05-22 RX ADMIN — FERROUS SULFATE TAB EC 325 MG (65 MG FE EQUIVALENT) 325 MG: 325 (65 FE) TABLET DELAYED RESPONSE at 10:05

## 2020-05-22 RX ADMIN — FOLIC ACID 1 MG: 1 TABLET ORAL at 10:05

## 2020-05-22 RX ADMIN — BETAMETHASONE VALERATE: 1.2 CREAM TOPICAL at 10:05

## 2020-05-22 RX ADMIN — DAPSONE 100 MG: 100 TABLET ORAL at 10:05

## 2020-05-22 NOTE — PHARMACY MED REC
"Admission Medication Reconciliation - Pharmacy Consult Note    The home medication history was taken by Yasmine Marinelli Pharmacy Tech.  Based on information gathered and subsequent review by the clinical pharmacist, the items below may need attention.    You may go to "Admission" then "Reconcile Home Medications" tabs to review and/or act upon these items.        PLEASE NOTE:  Medication list is not accurate. Patient is unable to list medications he is taking. Also, per Pharmacy fill dates, the only recent fills were for naproxen and hydrocodone/APAP.      Potential issues to be addressed PRIOR TO DISCHARGE  o Patient lacks understanding of therapy    Please address this information as you see fit.  Feel free to contact us if you have any questions or require assistance.    Dora Denny, AdryanD  [id number]                .    .            "

## 2020-05-22 NOTE — ASSESSMENT & PLAN NOTE
Likely prerenal etiology due to not feeling well and not eating well.  However, patient on many medications that can be affecting renal function. However does not have increase protein in urine.     Plan  -asymptomatic and normal WBC; BUN/Cr 57/2.6 on admission baseline at 1.3- trending down after fluid administration  -fluids and reassess

## 2020-05-22 NOTE — PROGRESS NOTES
Pt asking if he will be discharged today.  MD/team to come round and speak to pt at bedside this afternoon.

## 2020-05-22 NOTE — ASSESSMENT & PLAN NOTE
Patient with history of Psoriasis, visible and affecting patient on extremities. On Betamethosone cream daily.     - held once weekly methotrexate, unclear who is following his MTX therapy. Patient likely not complaint with his medications.   - Dermatology following- rec holding methotrexate and follow up appt on 6/1.

## 2020-05-22 NOTE — SUBJECTIVE & OBJECTIVE
Interval History: NAEON, patient has good ROM and tenderness improved. No acute distress reported. Dermatology consulted for psoriasis- rec holding methotrexate and follow up appt on 6/1. Awaiting final abx recs and duration per ID. Rheum not recommending aspiration at this time.    Review of Systems   Constitutional: Positive for unexpected weight change. Negative for chills, fatigue and fever.   HENT: Positive for dental problem. Negative for sore throat and trouble swallowing.    Eyes: Negative for photophobia and visual disturbance.   Respiratory: Negative for cough, chest tightness and shortness of breath.    Cardiovascular: Positive for leg swelling (Left LE to ankle). Negative for chest pain.   Gastrointestinal: Negative for abdominal distention, abdominal pain and diarrhea.   Genitourinary: Negative for difficulty urinating and dysuria.   Musculoskeletal: Negative for arthralgias and back pain.   Skin: Positive for rash.        Psoriasis present on all extremities   Allergic/Immunologic: Positive for immunocompromised state.   Neurological: Negative for dizziness, tremors, speech difficulty and light-headedness.   Psychiatric/Behavioral: Negative for agitation, behavioral problems and confusion.     Objective:     Vital Signs (Most Recent):  Temp: 96.8 °F (36 °C) (05/22/20 1103)  Pulse: (!) 52 (05/22/20 1103)  Resp: 20 (05/22/20 1103)  BP: (!) 170/79 (05/22/20 1103)  SpO2: 99 % (05/22/20 1103) Vital Signs (24h Range):  Temp:  [96 °F (35.6 °C)-97.8 °F (36.6 °C)] 96.8 °F (36 °C)  Pulse:  [52-63] 52  Resp:  [16-24] 20  SpO2:  [96 %-99 %] 99 %  BP: (123-170)/(73-79) 170/79     Weight: 61.5 kg (135 lb 11.1 oz)  Body mass index is 20.04 kg/m².    Intake/Output Summary (Last 24 hours) at 5/22/2020 1436  Last data filed at 5/21/2020 2300  Gross per 24 hour   Intake 240 ml   Output 750 ml   Net -510 ml      Physical Exam   Constitutional: He is oriented to person, place, and time. He appears well-developed and  well-nourished. He does not have a sickly appearance. No distress.   HENT:   Head: Normocephalic and atraumatic.   Eyes: Pupils are equal, round, and reactive to light. EOM are normal.   Neck: Normal range of motion. Neck supple.   Cardiovascular: Normal rate and regular rhythm.   Pulmonary/Chest: Effort normal and breath sounds normal. No respiratory distress.   Abdominal: Soft. Bowel sounds are normal. There is no tenderness.   Musculoskeletal: Normal range of motion. He exhibits edema (Severe swelling of left LE).   Neurological: He is alert and oriented to person, place, and time.   Skin: Skin is warm and dry. Rash noted. He is not diaphoretic.   Rash on lower arms and hands.  Patchy silverscally rash on trunk, mostly back.   Psychiatric: He has a normal mood and affect.       Significant Labs:   Blood Culture:   Recent Labs   Lab 05/21/20  0506   LABBLOO No Growth to date  No Growth to date     CBC:   Recent Labs   Lab 05/21/20  0506 05/22/20  0501   WBC 5.38 4.87   HGB 9.5* 9.3*   HCT 29.9* 29.8*    341     CMP:   Recent Labs   Lab 05/21/20  0506 05/22/20  0501   * 137   K 4.4 4.2    109   CO2 18* 21*   GLU 93 85   BUN 44* 33*   CREATININE 1.9* 1.7*   CALCIUM 8.0* 7.8*   ANIONGAP 10 7*   EGFRNONAA 37.7* 43.2*     Urine Culture: No results for input(s): LABURIN in the last 48 hours.  Urine Studies:   No results for input(s): COLORU, APPEARANCEUA, PHUR, SPECGRAV, PROTEINUA, GLUCUA, KETONESU, BILIRUBINUA, OCCULTUA, NITRITE, UROBILINOGEN, LEUKOCYTESUR, RBCUA, WBCUA, BACTERIA, SQUAMEPITHEL, HYALINECASTS in the last 48 hours.    Invalid input(s): GREGORYSUR  All pertinent labs within the past 24 hours have been reviewed.    Significant Imaging: I have reviewed all pertinent imaging results/findings within the past 24 hours.

## 2020-05-22 NOTE — PLAN OF CARE
POC reviewed with pt, verbalized an understanding; NADN; VSS; pt rested quietly through the night; no acute event/fall this shift; call light in reach, bed in lowest position

## 2020-05-22 NOTE — PROGRESS NOTES
[facility] Hospital Medicine  Progress Note    Patient Name: Andrew Velazquez  MRN: [id number]  Patient Class: IP- Inpatient   Admission Date: 5/19/2020  Length of Stay: 3 days  Attending Physician: Gosia Vanegas MD  Primary Care Provider: Marisol Tejada MD    [facility] Medicine Team: [facility] HOSP MED 5 Tanner Lovelace MD    Subjective:     Principal Problem:Pain and swelling of left lower extremity        HPI:  Patient is a 60 yo male with a pmh of AIDS (CD4 count in the 40s), COPD, HTN, polysubstance abuse, and CKD who presents with a chief complaint of lower left leg pain. He states the pain has been present for about 2 weeks, has been worsening, and is accompanied by swelling to the level of the ankle.  Pain is worst in foot/ankle region, and he rates it as a 6/10 at worst.   He denies any history of trauma.   He states the only associated symptom is decreased appetite. He denies diarrhea, headache, SOB, recent fever, cough, and abdominal pain.    Overview/Hospital Course:  Rheumatology consulted to assess foot and tap for fluid analysis but will not tap join due to risk of cellulitis. Blood cultures positive for GPCs- started empiric vancomycin. Xray left ankle shows no acute process. LLE ultrasound negative for DVT. Immunosuppressed ID consulted for assistance with HIV medication management holding ART therapy and continuing Dapson, weekly azithromycin and holding bactrim due to poor renal function. UA ordered given LISA showing few bacteria, no leukocytes, no protein and no nitrites. Dermatology consulted for psoriasis- rec holding methotrexate and follow up appt on 6/1. Awaiting final abx recs and duration per ID. Rheum not recommending aspiration at this time.      Interval History: NAEON, patient has good ROM and tenderness improved. No acute distress reported. Dermatology consulted for psoriasis- rec holding methotrexate and follow up appt on 6/1. Awaiting final abx recs and duration per  ID. Rheum not recommending aspiration at this time.    Review of Systems   Constitutional: Positive for unexpected weight change. Negative for chills, fatigue and fever.   HENT: Positive for dental problem. Negative for sore throat and trouble swallowing.    Eyes: Negative for photophobia and visual disturbance.   Respiratory: Negative for cough, chest tightness and shortness of breath.    Cardiovascular: Positive for leg swelling (Left LE to ankle). Negative for chest pain.   Gastrointestinal: Negative for abdominal distention, abdominal pain and diarrhea.   Genitourinary: Negative for difficulty urinating and dysuria.   Musculoskeletal: Negative for arthralgias and back pain.   Skin: Positive for rash.        Psoriasis present on all extremities   Allergic/Immunologic: Positive for immunocompromised state.   Neurological: Negative for dizziness, tremors, speech difficulty and light-headedness.   Psychiatric/Behavioral: Negative for agitation, behavioral problems and confusion.     Objective:     Vital Signs (Most Recent):  Temp: 96.8 °F (36 °C) (05/22/20 1103)  Pulse: (!) 52 (05/22/20 1103)  Resp: 20 (05/22/20 1103)  BP: (!) 170/79 (05/22/20 1103)  SpO2: 99 % (05/22/20 1103) Vital Signs (24h Range):  Temp:  [96 °F (35.6 °C)-97.8 °F (36.6 °C)] 96.8 °F (36 °C)  Pulse:  [52-63] 52  Resp:  [16-24] 20  SpO2:  [96 %-99 %] 99 %  BP: (123-170)/(73-79) 170/79     Weight: 61.5 kg (135 lb 11.1 oz)  Body mass index is 20.04 kg/m².    Intake/Output Summary (Last 24 hours) at 5/22/2020 1436  Last data filed at 5/21/2020 2300  Gross per 24 hour   Intake 240 ml   Output 750 ml   Net -510 ml      Physical Exam   Constitutional: He is oriented to person, place, and time. He appears well-developed and well-nourished. He does not have a sickly appearance. No distress.   HENT:   Head: Normocephalic and atraumatic.   Eyes: Pupils are equal, round, and reactive to light. EOM are normal.   Neck: Normal range of motion. Neck supple.    Cardiovascular: Normal rate and regular rhythm.   Pulmonary/Chest: Effort normal and breath sounds normal. No respiratory distress.   Abdominal: Soft. Bowel sounds are normal. There is no tenderness.   Musculoskeletal: Normal range of motion. He exhibits edema (Severe swelling of left LE).   Neurological: He is alert and oriented to person, place, and time.   Skin: Skin is warm and dry. Rash noted. He is not diaphoretic.   Rash on lower arms and hands.  Patchy silverscally rash on trunk, mostly back.   Psychiatric: He has a normal mood and affect.       Significant Labs:   Blood Culture:   Recent Labs   Lab 05/21/20  0506   LABBLOO No Growth to date  No Growth to date     CBC:   Recent Labs   Lab 05/21/20  0506 05/22/20  0501   WBC 5.38 4.87   HGB 9.5* 9.3*   HCT 29.9* 29.8*    341     CMP:   Recent Labs   Lab 05/21/20  0506 05/22/20  0501   * 137   K 4.4 4.2    109   CO2 18* 21*   GLU 93 85   BUN 44* 33*   CREATININE 1.9* 1.7*   CALCIUM 8.0* 7.8*   ANIONGAP 10 7*   EGFRNONAA 37.7* 43.2*     Urine Culture: No results for input(s): LABURIN in the last 48 hours.  Urine Studies:   No results for input(s): COLORU, APPEARANCEUA, PHUR, SPECGRAV, PROTEINUA, GLUCUA, KETONESU, BILIRUBINUA, OCCULTUA, NITRITE, UROBILINOGEN, LEUKOCYTESUR, RBCUA, WBCUA, BACTERIA, SQUAMEPITHEL, HYALINECASTS in the last 48 hours.    Invalid input(s): WRIGHTSUR  All pertinent labs within the past 24 hours have been reviewed.    Significant Imaging: I have reviewed all pertinent imaging results/findings within the past 24 hours.      Assessment/Plan:      * Pain and swelling of left lower extremity  Subacute gout(uric acid  v psoriatic arthritis(unusual to be localized to single large joint only) v. HIV arthritis vs bacteremia from skin breakdown vs cellulitis   -Clindamycin 600 mg IV q8h administered and DC'd due to GPC bactermia 1/2 culture positive. Waiting on speciation.   -Continue Vancomycin   -Rheumatology consulted to  assess foot and tap for fluid analysis- not recommending aspiration at this time.   -severe hyperuricemia- uric acid 10.3 on admit  -Blood culture with GPCs  -Xray left ankle shows no acute process.   -US LLE negative for DVT       LISA (acute kidney injury)  Likely prerenal etiology due to not feeling well and not eating well.  However, patient on many medications that can be affecting renal function. However does not have increase protein in urine.     Plan  -asymptomatic and normal WBC; BUN/Cr 57/2.6 on admission baseline at 1.3- trending down after fluid administration  -fluids and reassess           HLD (hyperlipidemia)  Continued home pravastatin 40 mg      Psoriasis  Patient with history of Psoriasis, visible and affecting patient on extremities. On Betamethosone cream daily.     - held once weekly methotrexate, unclear who is following his MTX therapy. Patient likely not complaint with his medications.   - Dermatology following- rec holding methotrexate and follow up appt on 6/1.    HTN (hypertension)  -started home toprol 50 mg qd and nifedipine 90 mg qd  -held hydrochlorothiazide due to LISA      AIDS (acquired immune deficiency syndrome)  CD for below 50 and elevated viral count. Not complaint with his medications. meant to be on darunavir/lisa/FTC/TAF but has not been taking it due to being unable to pick it up.    Plan  -continued home dapsone  -holding bactrim for opportunistic infection prophylaxis due to LISA  -Patient on symtuza for AIDS/HIV at home- did not bring medication to the hospital and medicine not on formulary- consulted immunocompromised ID, will discuss medication options and prophylaxis   -ID following and holding ART therapy.           VTE Risk Mitigation (From admission, onward)         Ordered     heparin (porcine) injection 5,000 Units  Every 8 hours      05/19/20 1841     IP VTE HIGH RISK PATIENT  Once      05/19/20 1841     Place sequential compression device  Until discontinued       05/19/20 1841                      Tanner Lovelace MD  Department of Hospital Medicine   [facility]

## 2020-05-22 NOTE — ASSESSMENT & PLAN NOTE
Subacute gout(uric acid  v psoriatic arthritis(unusual to be localized to single large joint only) v. HIV arthritis vs bacteremia from skin breakdown vs cellulitis   -Clindamycin 600 mg IV q8h administered and DC'd due to GPC bactermia 1/2 culture positive. Waiting on speciation.   -Continue Vancomycin   -Rheumatology consulted to assess foot and tap for fluid analysis- not recommending aspiration at this time.   -severe hyperuricemia- uric acid 10.3 on admit  -Blood culture with GPCs  -Xray left ankle shows no acute process.   -US LLE negative for DVT

## 2020-05-23 VITALS
SYSTOLIC BLOOD PRESSURE: 155 MMHG | RESPIRATION RATE: 14 BRPM | OXYGEN SATURATION: 96 % | WEIGHT: 135.69 LBS | HEIGHT: 69 IN | TEMPERATURE: 97 F | DIASTOLIC BLOOD PRESSURE: 72 MMHG | HEART RATE: 61 BPM | BODY MASS INDEX: 20.1 KG/M2

## 2020-05-23 LAB
ANION GAP SERPL CALC-SCNC: 8 MMOL/L (ref 8–16)
ANISOCYTOSIS BLD QL SMEAR: SLIGHT
BASOPHILS NFR BLD: 0 % (ref 0–1.9)
BUN SERPL-MCNC: 28 MG/DL (ref 6–20)
BURR CELLS BLD QL SMEAR: ABNORMAL
CALCIUM SERPL-MCNC: 8 MG/DL (ref 8.7–10.5)
CHLORIDE SERPL-SCNC: 107 MMOL/L (ref 95–110)
CO2 SERPL-SCNC: 20 MMOL/L (ref 23–29)
CREAT SERPL-MCNC: 1.5 MG/DL (ref 0.5–1.4)
DIFFERENTIAL METHOD: ABNORMAL
EOSINOPHIL NFR BLD: 11 % (ref 0–8)
ERYTHROCYTE [DISTWIDTH] IN BLOOD BY AUTOMATED COUNT: 13.8 % (ref 11.5–14.5)
EST. GFR  (AFRICAN AMERICAN): 58.1 ML/MIN/1.73 M^2
EST. GFR  (NON AFRICAN AMERICAN): 50.2 ML/MIN/1.73 M^2
GLUCOSE SERPL-MCNC: 88 MG/DL (ref 70–110)
HCT VFR BLD AUTO: 30 % (ref 40–54)
HGB BLD-MCNC: 9.4 G/DL (ref 14–18)
HYPOCHROMIA BLD QL SMEAR: ABNORMAL
IMM GRANULOCYTES # BLD AUTO: ABNORMAL K/UL (ref 0–0.04)
IMM GRANULOCYTES NFR BLD AUTO: ABNORMAL % (ref 0–0.5)
LYMPHOCYTES NFR BLD: 11 % (ref 18–48)
MCH RBC QN AUTO: 28.4 PG (ref 27–31)
MCHC RBC AUTO-ENTMCNC: 31.3 G/DL (ref 32–36)
MCV RBC AUTO: 91 FL (ref 82–98)
METAMYELOCYTES NFR BLD MANUAL: 3 %
MONOCYTES NFR BLD: 6 % (ref 4–15)
MYELOCYTES NFR BLD MANUAL: 1 %
NEUTROPHILS NFR BLD: 65 % (ref 38–73)
NEUTS BAND NFR BLD MANUAL: 3 %
NRBC BLD-RTO: 0 /100 WBC
OVALOCYTES BLD QL SMEAR: ABNORMAL
PLATELET # BLD AUTO: 417 K/UL (ref 150–350)
PLATELET BLD QL SMEAR: ABNORMAL
PMV BLD AUTO: 10.2 FL (ref 9.2–12.9)
POTASSIUM SERPL-SCNC: 4.6 MMOL/L (ref 3.5–5.1)
RBC # BLD AUTO: 3.31 M/UL (ref 4.6–6.2)
SODIUM SERPL-SCNC: 135 MMOL/L (ref 136–145)
WBC # BLD AUTO: 5.8 K/UL (ref 3.9–12.7)

## 2020-05-23 PROCEDURE — 85027 COMPLETE CBC AUTOMATED: CPT

## 2020-05-23 PROCEDURE — 25000003 PHARM REV CODE 250: Performed by: STUDENT IN AN ORGANIZED HEALTH CARE EDUCATION/TRAINING PROGRAM

## 2020-05-23 PROCEDURE — 99233 PR SUBSEQUENT HOSPITAL CARE,LEVL III: ICD-10-PCS | Mod: ,,, | Performed by: INTERNAL MEDICINE

## 2020-05-23 PROCEDURE — 80048 BASIC METABOLIC PNL TOTAL CA: CPT

## 2020-05-23 PROCEDURE — 36415 COLL VENOUS BLD VENIPUNCTURE: CPT

## 2020-05-23 PROCEDURE — 99238 HOSP IP/OBS DSCHRG MGMT 30/<: CPT | Mod: ,,, | Performed by: INTERNAL MEDICINE

## 2020-05-23 PROCEDURE — 85007 BL SMEAR W/DIFF WBC COUNT: CPT

## 2020-05-23 PROCEDURE — 99233 SBSQ HOSP IP/OBS HIGH 50: CPT | Mod: ,,, | Performed by: INTERNAL MEDICINE

## 2020-05-23 PROCEDURE — 99238 PR HOSPITAL DISCHARGE DAY,<30 MIN: ICD-10-PCS | Mod: ,,, | Performed by: INTERNAL MEDICINE

## 2020-05-23 PROCEDURE — 63600175 PHARM REV CODE 636 W HCPCS: Performed by: STUDENT IN AN ORGANIZED HEALTH CARE EDUCATION/TRAINING PROGRAM

## 2020-05-23 RX ORDER — CEPHALEXIN 500 MG/1
500 CAPSULE ORAL EVERY 12 HOURS
Status: DISCONTINUED | OUTPATIENT
Start: 2020-05-23 | End: 2020-05-23 | Stop reason: HOSPADM

## 2020-05-23 RX ORDER — CEPHALEXIN 500 MG/1
500 CAPSULE ORAL EVERY 12 HOURS
Qty: 11 CAPSULE | Refills: 0 | Status: SHIPPED | OUTPATIENT
Start: 2020-05-23 | End: 2020-06-30 | Stop reason: ALTCHOICE

## 2020-05-23 RX ADMIN — CEFTRIAXONE SODIUM 1 G: 1 INJECTION, POWDER, FOR SOLUTION INTRAMUSCULAR; INTRAVENOUS at 01:05

## 2020-05-23 RX ADMIN — FOLIC ACID 1 MG: 1 TABLET ORAL at 08:05

## 2020-05-23 RX ADMIN — NIFEDIPINE 90 MG: 30 TABLET, FILM COATED, EXTENDED RELEASE ORAL at 08:05

## 2020-05-23 RX ADMIN — HEPARIN SODIUM 5000 UNITS: 5000 INJECTION INTRAVENOUS; SUBCUTANEOUS at 01:05

## 2020-05-23 RX ADMIN — BETAMETHASONE VALERATE: 1.2 CREAM TOPICAL at 08:05

## 2020-05-23 RX ADMIN — METOPROLOL SUCCINATE 50 MG: 50 TABLET, EXTENDED RELEASE ORAL at 08:05

## 2020-05-23 RX ADMIN — FERROUS SULFATE TAB EC 325 MG (65 MG FE EQUIVALENT) 325 MG: 325 (65 FE) TABLET DELAYED RESPONSE at 08:05

## 2020-05-23 RX ADMIN — ASPIRIN 81 MG: 81 TABLET, COATED ORAL at 08:05

## 2020-05-23 RX ADMIN — HEPARIN SODIUM 5000 UNITS: 5000 INJECTION INTRAVENOUS; SUBCUTANEOUS at 05:05

## 2020-05-23 NOTE — ASSESSMENT & PLAN NOTE
CD for below 50 and elevated viral count. Not complaint with his medications. meant to be on darunavir/lisa/FTC/TAF but has not been taking it due to being unable to pick it up.    Plan  -continued home dapsone  -holding bactrim for opportunistic infection prophylaxis due to LISA  -Patient on symtuza for AIDS/HIV at home- did not bring medication to the hospital and medicine not on formulary- consulted immunocompromised ID, will discuss medication options and prophylaxis   -ID following and holding ART therapy- follow up appointment arranged outpatient

## 2020-05-23 NOTE — ASSESSMENT & PLAN NOTE
Subacute gout(uric acid  v psoriatic arthritis(unusual to be localized to single large joint only) v. HIV arthritis vs bacteremia from skin breakdown vs cellulitis   -Clindamycin 600 mg IV q8h administered and DC'd due to GPC bactermia 1/2 culture positive. Waiting on speciation.   -Continue Vancomycin   -Rheumatology consulted to assess foot and tap for fluid analysis- not recommending aspiration at this time.   -severe hyperuricemia- uric acid 10.3 on admit  -Blood culture with GPCs- ID recommends PO Keflex to complete 7 day course. Will discharge patient on Keflex 500 mg BID for 5 more days.  -Xray left ankle shows no acute process.   -US LLE negative for DVT

## 2020-05-23 NOTE — SUBJECTIVE & OBJECTIVE
Interval History: Doing well. No complaints. Trying to keep leg elevated. Appetite improved.    Review of Systems   Constitutional: Negative for chills and fever.   All other systems reviewed and are negative.    Objective:     Vital Signs (Most Recent):  Temp: 97.2 °F (36.2 °C) (05/23/20 0822)  Pulse: (!) 53 (05/23/20 0822)  Resp: 14 (05/23/20 0822)  BP: (!) 153/74 (05/23/20 0822)  SpO2: 97 % (05/23/20 0822) Vital Signs (24h Range):  Temp:  [96.1 °F (35.6 °C)-98.4 °F (36.9 °C)] 97.2 °F (36.2 °C)  Pulse:  [52-71] 53  Resp:  [14-23] 14  SpO2:  [96 %-99 %] 97 %  BP: (140-170)/(74-87) 153/74     Weight: 61.5 kg (135 lb 11.1 oz)  Body mass index is 20.04 kg/m².    Estimated Creatinine Clearance: 46.2 mL/min (A) (based on SCr of 1.5 mg/dL (H)).    Physical Exam   Constitutional: He appears well-developed and well-nourished. No distress.   HENT:   Head: Normocephalic and atraumatic.   Right Ear: External ear normal.   Left Ear: External ear normal.   Poor dentition   Eyes: Pupils are equal, round, and reactive to light. EOM are normal.   Cardiovascular: Normal rate and normal heart sounds.   Pulmonary/Chest: Effort normal and breath sounds normal.   Abdominal: Soft. Bowel sounds are normal.   Skin: He is not diaphoretic.   Nursing note and vitals reviewed.      Significant Labs:   Blood Culture:   Recent Labs   Lab 05/19/20  1455 05/19/20  1515 05/21/20  0506   LABBLOO Gram stain shruthi bottle: Gram positive cocci in chains resembling Strep  Results called to and read back by: Robyn Joe RN  05/20/2020  03:36  STREPTOCOCCUS DYSGALACTIAE  Beta-hemolytic streptococci are routinely susceptible to   penicillins,cephalosporins and carbapenems.  * No Growth to date  No Growth to date  No Growth to date  No Growth to date No Growth to date  No Growth to date  No Growth to date     CBC:   Recent Labs   Lab 05/22/20  0501 05/23/20  0417   WBC 4.87 5.80   HGB 9.3* 9.4*   HCT 29.8* 30.0*    417*     CMP:   Recent Labs    Lab 05/22/20  0501 05/23/20  0417    135*   K 4.2 4.6    107   CO2 21* 20*   GLU 85 88   BUN 33* 28*   CREATININE 1.7* 1.5*   CALCIUM 7.8* 8.0*   ANIONGAP 7* 8   EGFRNONAA 43.2* 50.2*     Wound Culture: No results for input(s): LABAERO in the last 4320 hours.    Significant Imaging: I have reviewed all pertinent imaging results/findings within the past 24 hours.

## 2020-05-23 NOTE — ASSESSMENT & PLAN NOTE
58 yo male with a pmh of AIDS (CD4 count in the 40s), COPD, HTN, polysubstance abuse, and CKD who presents with a chief complaint of lower left leg pain. ID consulted to assist with ART    -he is not currently taking his ART  -would not start ART in this setting has he doesn't have an OI which would benefit from immediate ART  - uncomplicated strep bacteremia from cellulitis  - okay to discharge on Vantin x 7 days  - have patient f/u with Dr. Tejada in 7 days

## 2020-05-23 NOTE — PROGRESS NOTES
[facility]  Infectious Disease  Progress Note    Patient Name: Andrew Velazquez  MRN: [id number]  Admission Date: 5/19/2020  Length of Stay: 4 days  Attending Physician: Brunilda Ridley MD  Primary Care Provider: Marisol Tejada MD    Isolation Status: No active isolations     Assessment/Plan:      AIDS (acquired immune deficiency syndrome)  58 yo male with a pmh of AIDS (CD4 count in the 40s), COPD, HTN, polysubstance abuse, and CKD who presents with a chief complaint of lower left leg pain. ID consulted to assist with ART    -he is not currently taking his ART  -would not start ART in this setting has he doesn't have an OI which would benefit from immediate ART  - uncomplicated strep bacteremia from cellulitis  - okay to discharge on Keflex x 7 days (PCN allergy, tolerating CTX)  - have patient f/u with Dr. Tejada in 7 days    I will sign off. Please contact us if you have any additional questions.    True Pichardo MD  Infectious Disease  [facility]    Subjective:     Principal Problem:Pain and swelling of left lower extremity    HPI: 58 yo male with a pmh of AIDS (CD4 count in the 40s), COPD, HTN, polysubstance abuse, and CKD who presents with a chief complaint of lower left leg pain. He states the pain has been present for about 2 weeks, has been worsening, and is accompanied by swelling to the level of the ankle.  Pain is worst in foot/ankle region, and he rates it as a 6/10 at worst.   He denies any history of trauma.   He states the only associated symptom is decreased appetite. He denies diarrhea, headache, SOB, recent fever, cough, and abdominal pain.    He is meant to be on darunavir/lisa/FTC/TAF but has not been taking it due to being unable to pick it up  Interval History: Doing well. No complaints. Trying to keep leg elevated. Appetite improved.    Review of Systems   Constitutional: Negative for chills and fever.   All other systems reviewed and are  negative.    Objective:     Vital Signs (Most Recent):  Temp: 97.2 °F (36.2 °C) (05/23/20 0822)  Pulse: (!) 53 (05/23/20 0822)  Resp: 14 (05/23/20 0822)  BP: (!) 153/74 (05/23/20 0822)  SpO2: 97 % (05/23/20 0822) Vital Signs (24h Range):  Temp:  [96.1 °F (35.6 °C)-98.4 °F (36.9 °C)] 97.2 °F (36.2 °C)  Pulse:  [52-71] 53  Resp:  [14-23] 14  SpO2:  [96 %-99 %] 97 %  BP: (140-170)/(74-87) 153/74     Weight: 61.5 kg (135 lb 11.1 oz)  Body mass index is 20.04 kg/m².    Estimated Creatinine Clearance: 46.2 mL/min (A) (based on SCr of 1.5 mg/dL (H)).    Physical Exam   Constitutional: He appears well-developed and well-nourished. No distress.   HENT:   Head: Normocephalic and atraumatic.   Right Ear: External ear normal.   Left Ear: External ear normal.   Poor dentition   Eyes: Pupils are equal, round, and reactive to light. EOM are normal.   Cardiovascular: Normal rate and normal heart sounds.   Pulmonary/Chest: Effort normal and breath sounds normal.   Abdominal: Soft. Bowel sounds are normal.   Skin: He is not diaphoretic.   Nursing note and vitals reviewed.      Significant Labs:   Blood Culture:   Recent Labs   Lab 05/19/20  1455 05/19/20  1515 05/21/20  0506   LABBLOO Gram stain shruthi bottle: Gram positive cocci in chains resembling Strep  Results called to and read back by: Robyn Joe RN  05/20/2020  03:36  STREPTOCOCCUS DYSGALACTIAE  Beta-hemolytic streptococci are routinely susceptible to   penicillins,cephalosporins and carbapenems.  * No Growth to date  No Growth to date  No Growth to date  No Growth to date No Growth to date  No Growth to date  No Growth to date     CBC:   Recent Labs   Lab 05/22/20  0501 05/23/20  0417   WBC 4.87 5.80   HGB 9.3* 9.4*   HCT 29.8* 30.0*    417*     CMP:   Recent Labs   Lab 05/22/20  0501 05/23/20  0417    135*   K 4.2 4.6    107   CO2 21* 20*   GLU 85 88   BUN 33* 28*   CREATININE 1.7* 1.5*   CALCIUM 7.8* 8.0*   ANIONGAP 7* 8   EGFRNONAA 43.2* 50.2*      Wound Culture: No results for input(s): LABAERO in the last 4320 hours.    Significant Imaging: I have reviewed all pertinent imaging results/findings within the past 24 hours.

## 2020-05-23 NOTE — ASSESSMENT & PLAN NOTE
60 yo male with a pmh of AIDS (CD4 count in the 40s), COPD, HTN, polysubstance abuse, and CKD who presents with a chief complaint of lower left leg pain. ID consulted to assist with ART    -he is not currently taking his ART  -would not start ART in this setting has he doesn't have an OI which would benefit from immediate ART  - uncomplicated strep bacteremia from cellulitis  - okay to discharge on high-dose Amoxil (1g po TID) x 7 days  - have patient f/u with Dr. Tejada in 7 days

## 2020-05-23 NOTE — PLAN OF CARE
POC reviewed with pt, verbalized an understanding; NADN ;VSS; pt rested quietly throughout the night; no acute event/fall; call light in reach, bed in lowest position

## 2020-05-23 NOTE — DISCHARGE SUMMARY
[facility] Hospital Medicine  Discharge Summary      Patient Name: Andrew Velazquez  MRN: [id number]  Admission Date: 5/19/2020  Hospital Length of Stay: 4 days  Discharge Date and Time:  05/23/2020 3:36 PM  Attending Physician: Brunilda Ridley MD   Discharging Provider: Tanner Lovelace MD  Primary Care Provider: Marisol Tejada MD  [facility] Medicine Team: [facility] HOSP MED 5 Tanner Lovelace MD    HPI:   Patient is a 58 yo male with a pmh of AIDS (CD4 count in the 40s), COPD, HTN, polysubstance abuse, and CKD who presents with a chief complaint of lower left leg pain. He states the pain has been present for about 2 weeks, has been worsening, and is accompanied by swelling to the level of the ankle.  Pain is worst in foot/ankle region, and he rates it as a 6/10 at worst.   He denies any history of trauma.   He states the only associated symptom is decreased appetite. He denies diarrhea, headache, SOB, recent fever, cough, and abdominal pain.    * No surgery found *      Hospital Course:   Rheumatology consulted to assess foot and tap for fluid analysis but will not tap join due to risk of cellulitis. Blood cultures positive for GPCs- started empiric vancomycin. Xray left ankle shows no acute process. LLE ultrasound negative for DVT. Immunosuppressed ID consulted for assistance with HIV medication management holding ART therapy and continuing Dapson, weekly azithromycin and holding bactrim due to poor renal function. UA ordered given LISA showing few bacteria, no leukocytes, no protein and no nitrites. Dermatology consulted for psoriasis- rec holding methotrexate and follow up appt on 6/1. Rheum not recommending aspiration at this time. ID recommends PO Keflex to complete 7 day course. Will discharge patient on Keflex 500 mg BID for 5 more days.     Review of Systems   Constitutional: Positive for unexpected weight change. Negative for chills, fatigue and fever.   HENT: Positive for dental problem.  Negative for sore throat and trouble swallowing.    Eyes: Negative for photophobia and visual disturbance.   Respiratory: Negative for cough, chest tightness and shortness of breath.    Cardiovascular: Positive for leg swelling (Left LE to ankle). Negative for chest pain.   Gastrointestinal: Negative for abdominal distention, abdominal pain and diarrhea.   Genitourinary: Negative for difficulty urinating and dysuria.   Musculoskeletal: Negative for arthralgias and back pain.   Skin: Positive for rash.        Psoriasis present on all extremities   Allergic/Immunologic: Positive for immunocompromised state.   Neurological: Negative for dizziness, tremors, speech difficulty and light-headedness.   Psychiatric/Behavioral: Negative for agitation, behavioral problems and confusion.     Objective:     Vital Signs (Most Recent):  Temp: 96.7 °F (35.9 °C) (05/23/20 1522)  Pulse: 61 (05/23/20 1522)  Resp: 14 (05/23/20 1522)  BP: (!) 155/72 (05/23/20 1522)  SpO2: 96 % (05/23/20 1522) Vital Signs (24h Range):  Temp:  [96.1 °F (35.6 °C)-98.4 °F (36.9 °C)] 96.7 °F (35.9 °C)  Pulse:  [53-71] 61  Resp:  [14-23] 14  SpO2:  [96 %-98 %] 96 %  BP: (140-161)/(72-82) 155/72     Weight: 61.5 kg (135 lb 11.1 oz)  Body mass index is 20.04 kg/m².    Intake/Output Summary (Last 24 hours) at 5/23/2020 1542  Last data filed at 5/23/2020 1108  Gross per 24 hour   Intake --   Output 200 ml   Net -200 ml      Physical Exam   Constitutional: He is oriented to person, place, and time. He appears well-developed and well-nourished. He does not have a sickly appearance. No distress.   HENT:   Head: Normocephalic and atraumatic.   Eyes: Pupils are equal, round, and reactive to light. EOM are normal.   Neck: Normal range of motion. Neck supple.   Cardiovascular: Normal rate and regular rhythm.   Pulmonary/Chest: Effort normal and breath sounds normal. No respiratory distress.   Abdominal: Soft. Bowel sounds are normal. There is no tenderness.    Musculoskeletal: Normal range of motion. He exhibits edema (Severe swelling of left LE).   Neurological: He is alert and oriented to person, place, and time.   Skin: Skin is warm and dry. Rash noted. He is not diaphoretic.   Rash on lower arms and hands.  Patchy silverscally rash on trunk, mostly back.   Psychiatric: He has a normal mood and affect.     Consults:   Consults (From admission, onward)        Status Ordering Provider     Inpatient consult to Dermatology  Once     Provider:  (Not yet assigned)    Completed KAYE MARTINEZ     Inpatient consult to Infectious Diseases  Once     Provider:  (Not yet assigned)    Completed SRIDHAR PORRAS     Inpatient consult to Rheumatology  Once     Provider:  (Not yet assigned)    Completed SRIDHAR PORRAS          * Cellulitis of left lower extremity  Subacute gout(uric acid  v psoriatic arthritis(unusual to be localized to single large joint only) v. HIV arthritis vs bacteremia from skin breakdown vs cellulitis   -Clindamycin 600 mg IV q8h administered and DC'd due to GPC bactermia 1/2 culture positive. Waiting on speciation.   -Continue Vancomycin   -Rheumatology consulted to assess foot and tap for fluid analysis- not recommending aspiration at this time.   -severe hyperuricemia- uric acid 10.3 on admit  -Blood culture with GPCs- ID recommends PO Keflex to complete 7 day course. Will discharge patient on Keflex 500 mg BID for 5 more days.  -Xray left ankle shows no acute process.   -US LLE negative for DVT       LISA (acute kidney injury)  Likely prerenal etiology due to not feeling well and not eating well.  However, patient on many medications that can be affecting renal function. However does not have increase protein in urine.     Plan  -asymptomatic and normal WBC; BUN/Cr 57/2.6 on admission baseline at 1.3- trending down after fluid administration  -fluids and reassess           HLD (hyperlipidemia)  Continued home pravastatin 40 mg      Psoriasis  Patient  with history of Psoriasis, visible and affecting patient on extremities. On Betamethosone cream daily.     - held once weekly methotrexate, unclear who is following his MTX therapy. Patient likely not complaint with his medications.   - Dermatology following- rec holding methotrexate and follow up appt on 6/1.    HTN (hypertension)  -started home toprol 50 mg qd and nifedipine 90 mg qd  -held hydrochlorothiazide due to LISA      AIDS (acquired immune deficiency syndrome)  CD for below 50 and elevated viral count. Not complaint with his medications. meant to be on darunavir/lisa/FTC/TAF but has not been taking it due to being unable to pick it up.    Plan  -continued home dapsone  -holding bactrim for opportunistic infection prophylaxis due to LISA  -Patient on symtuza for AIDS/HIV at home- did not bring medication to the hospital and medicine not on formulary- consulted immunocompromised ID, will discuss medication options and prophylaxis   -ID following and holding ART therapy- follow up appointment arranged outpatient          Final Active Diagnoses:    Diagnosis Date Noted POA    PRINCIPAL PROBLEM:  Cellulitis of left lower extremity [L03.116] 05/19/2020 Yes    LISA (acute kidney injury) [N17.9] 05/19/2020 Yes    AIDS (acquired immune deficiency syndrome) [B20] 07/15/2014 Yes     Chronic    HTN (hypertension) [I10] 07/15/2014 Yes     Chronic    Psoriasis [L40.9] 07/15/2014 Yes     Chronic    HLD (hyperlipidemia) [E78.5] 07/15/2014 Yes      Problems Resolved During this Admission:    Diagnosis Date Noted Date Resolved POA    Left ankle swelling [M25.472] 05/20/2020 05/20/2020 Unknown       Discharged Condition: stable    Disposition: Home or Self Care    Follow Up:    Patient Instructions:      Ambulatory referral/consult to Infectious Disease   Standing Status: Future   Referral Priority: Routine Referral Type: Consultation   Referral Reason: Specialty Services Required   Referred to Provider: YUSRA RUBALCAVA  NOEMI Requested Specialty: Infectious Diseases   Number of Visits Requested: 1     Diet Adult Regular     Notify your health care provider if you experience any of the following:  temperature >100.4     Notify your health care provider if you experience any of the following:  persistent nausea and vomiting or diarrhea     Notify your health care provider if you experience any of the following:  severe uncontrolled pain     Notify your health care provider if you experience any of the following:  redness, tenderness, or signs of infection (pain, swelling, redness, odor or green/yellow discharge around incision site)     Notify your health care provider if you experience any of the following:  difficulty breathing or increased cough     Notify your health care provider if you experience any of the following:  severe persistent headache     Notify your health care provider if you experience any of the following:  worsening rash     Notify your health care provider if you experience any of the following:  persistent dizziness, light-headedness, or visual disturbances     Notify your health care provider if you experience any of the following:  increased confusion or weakness     Activity as tolerated       Significant Diagnostic Studies: Labs: All labs within the past 24 hours have been reviewed    Pending Diagnostic Studies:     Procedure Component Value Units Date/Time    Toxoplasma gondii antibody, IgM [[id number]] Collected:  05/20/20 1318    Order Status:  Sent Lab Status:  In process Updated:  05/20/20 1329    Specimen:  Blood          Medications:  Reconciled Home Medications:      Medication List      START taking these medications    cephALEXin 500 MG capsule  Commonly known as:  KEFLEX  Take 1 capsule (500 mg total) by mouth every 12 (twelve) hours.        CHANGE how you take these medications    aspirin 81 MG EC tablet  Commonly known as:  ECOTRIN  Take 1 tablet (81 mg total) by mouth once daily.  What changed:   when to take this        CONTINUE taking these medications    albuterol 90 mcg/actuation inhaler  Commonly known as:  PROVENTIL/VENTOLIN HFA  Inhale 1-2 puffs into the lungs every 6 (six) hours as needed for Wheezing (sob).     azithromycin 600 MG Tab  Commonly known as:  ZITHROMAX  Take 2 tablets (1,200 mg total) by mouth every 7 days.     b complex vitamins tablet  Take 1 tablet by mouth once daily.     betamethasone dipropionate 0.05 % cream  Commonly known as:  DIPROLENE  Apply topically 2 (two) times daily.     dapsone 100 MG Tab  Take 1 tablet (100 mg total) by mouth once daily.     darunavir-lisa-emtri-tenof ala [id number] mg Tab  Commonly known as:  SYMTUZA  Take 1 tablet by mouth once daily. Stop truvada and prezcobix     diphenhydrAMINE 25 mg capsule  Commonly known as:  BENADRYL  Take 1 capsule (25 mg total) by mouth every 6 (six) hours as needed for Itching or Allergies.     ferrous sulfate 325 mg (65 mg iron) Tab tablet  Commonly known as:  FEOSOL  Take 1 tablet (325 mg total) by mouth once daily.     folic acid 1 MG tablet  Commonly known as:  FOLVITE  Take 1 tablet (1 mg total) by mouth once daily.     hydroCHLOROthiazide 25 MG tablet  Commonly known as:  HYDRODIURIL  Take 1 tablet (25 mg total) by mouth once daily.     HYDROcodone-acetaminophen 7.5-325 mg per tablet  Commonly known as:  NORCO  Take 1 tablet by mouth every 6 (six) hours as needed for Pain. 90 to last 30 days.  Do not fill until 4/8/2020     methotrexate 2.5 MG Tab  Take 4 tablets by mouth every 7 days     metoprolol succinate 50 MG 24 hr tablet  Commonly known as:  TOPROL-XL  Take 1 tablet (50 mg total) by mouth once daily.     naproxen 500 MG tablet  Commonly known as:  NAPROSYN  Take 500 mg by mouth 2 (two) times daily with meals.     NIFEdipine 90 MG (OSM) 24 hr tablet  Commonly known as:  PROCARDIA-XL  Take 1 tablet (90 mg total) by mouth once daily.     pravastatin 40 MG tablet  Commonly known as:  PRAVACHOL  Take 1 tablet  (40 mg total) by mouth every evening.     zolpidem 10 mg Tab  Commonly known as:  AMBIEN  TAKE ONE TABLET BY MOUTH ONCE A DAY AT BEDTIME        STOP taking these medications    predniSONE 20 MG tablet  Commonly known as:  DELTASONE            Indwelling Lines/Drains at time of discharge:   Lines/Drains/Airways     None                 Time spent on the discharge of patient: 35 minutes  Patient was seen and examined on the date of discharge and determined to be suitable for discharge.         Tanner Lovelace MD  Department of Hospital Medicine  [facility]

## 2020-05-24 ENCOUNTER — PATIENT OUTREACH (OUTPATIENT)
Dept: ADMINISTRATIVE | Facility: CLINIC | Age: 60
End: 2020-05-24

## 2020-05-24 LAB — BACTERIA BLD CULT: NORMAL

## 2020-05-24 NOTE — PROGRESS NOTES
C3 nurse attempted to contact patient. No answer.  C3 nurse attempted to contact Andrew Velazquez  for a TCC post hospital discharge follow up call. No answer at phone number listed and no voicemail available. The patient does not have a scheduled HOSFU appointment within 7-14 days post hospital discharge date 05/23/2020. Message sent to Physician staff to assist with HOSFU appointment scheduling.

## 2020-05-26 LAB
BACTERIA BLD CULT: NORMAL
T GONDII IGM SER-ACNC: <3 AU/ML

## 2020-05-26 NOTE — TELEPHONE ENCOUNTER
Please forward this important TCC information to your provider in order to maximize the post discharge care delivery of this patient.    C3 nurse spoke with Andrew Velazquez  for a TCC post hospital discharge follow up call. The patient has a scheduled HOSFU appointment with Marisol Tejada MD on 06/01/2020 @ 1100.  .  Respectfully,  Lizette Rousseau LPN    [facility]    [email or web address]       Please do not reply to this message, as this inbox is not routinely monitored.

## 2020-05-26 NOTE — PATIENT INSTRUCTIONS
Discharge Instructions for Cellulitis  You have been diagnosed with cellulitis. This is an infection in the deepest layer of the skin. In some cases, the infection also affects the muscle. Cellulitis is caused by bacteria. The bacteria can enter the body through broken skin. This can happen with a cut, scratch, animal bite, or an insect bite that has been scratched. You may have been treated in the hospital with antibiotics and fluids. You will likely be given a prescription for antibiotics to take at home. This sheet will help you take care of yourself at home.  Home care  When you are home:  · Take the prescribed antibiotic medicine you are given as directed until it is gone. Take it even if you feel better. It treats the infection and stops it from returning. Not taking all the medicine can make future infections hard to treat.  · Keep the infected area clean.  · When possible, raise the infected area above the level of your heart. This helps keep swelling down.  · Talk with your healthcare provider if you are in pain. Ask what kind of over-the-counter medicine you can take for pain.  · Apply clean bandages as advised.  · Take your temperature once a day for a week.  · Wash your hands often to prevent spreading the infection.  In the future, wash your hands before and after you touch cuts, scratches, or bandages. This will help prevent infection.   When to call your healthcare provider  Call your healthcare provider immediately if you have any of the following:  · Difficulty or pain when moving the joints above or below the infected area  · Discharge or pus draining from the area  · Fever of 100.4°F (38°C) or higher, or as directed by your healthcare provider  · Pain that gets worse in or around the infected   · Redness that gets worse in or around the infected area, particularly if the area of redness expands to a wider area  · Shaking chills  · Swelling of the infected area  · Vomiting   Date Last Reviewed:  8/1/2016  © 8173-2362 [employer]. [address] [place] [address], [place] [address]. All rights reserved. This information is not intended as a substitute for professional medical care. Always follow your healthcare professional's instructions.

## 2020-07-31 ENCOUNTER — LAB VISIT (OUTPATIENT)
Dept: LAB | Facility: HOSPITAL | Age: 60
End: 2020-07-31
Attending: INTERNAL MEDICINE
Payer: MEDICAID

## 2020-07-31 DIAGNOSIS — B20 AIDS: ICD-10-CM

## 2020-07-31 DIAGNOSIS — B20 AIDS (ACQUIRED IMMUNE DEFICIENCY SYNDROME): Chronic | ICD-10-CM

## 2020-07-31 DIAGNOSIS — G89.29 CHRONIC MIDLINE LOW BACK PAIN WITHOUT SCIATICA: ICD-10-CM

## 2020-07-31 DIAGNOSIS — M54.50 CHRONIC MIDLINE LOW BACK PAIN WITHOUT SCIATICA: ICD-10-CM

## 2020-07-31 DIAGNOSIS — E78.2 MIXED HYPERLIPIDEMIA: ICD-10-CM

## 2020-07-31 LAB
ALBUMIN SERPL BCP-MCNC: 3.2 G/DL (ref 3.5–5.2)
ALP SERPL-CCNC: 125 U/L (ref 55–135)
ALT SERPL W/O P-5'-P-CCNC: 10 U/L (ref 10–44)
ANION GAP SERPL CALC-SCNC: 7 MMOL/L (ref 8–16)
AST SERPL-CCNC: 16 U/L (ref 10–40)
BASOPHILS # BLD AUTO: 0.03 K/UL (ref 0–0.2)
BASOPHILS NFR BLD: 0.6 % (ref 0–1.9)
BILIRUB SERPL-MCNC: 0.4 MG/DL (ref 0.1–1)
BUN SERPL-MCNC: 27 MG/DL (ref 6–20)
CALCIUM SERPL-MCNC: 8.4 MG/DL (ref 8.7–10.5)
CHLORIDE SERPL-SCNC: 103 MMOL/L (ref 95–110)
CHOLEST SERPL-MCNC: 201 MG/DL (ref 120–199)
CHOLEST/HDLC SERPL: 4.8 {RATIO} (ref 2–5)
CO2 SERPL-SCNC: 27 MMOL/L (ref 23–29)
CREAT SERPL-MCNC: 1.3 MG/DL (ref 0.5–1.4)
DIFFERENTIAL METHOD: ABNORMAL
EOSINOPHIL # BLD AUTO: 0.5 K/UL (ref 0–0.5)
EOSINOPHIL NFR BLD: 9.3 % (ref 0–8)
ERYTHROCYTE [DISTWIDTH] IN BLOOD BY AUTOMATED COUNT: 17.6 % (ref 11.5–14.5)
EST. GFR  (AFRICAN AMERICAN): >60 ML/MIN/1.73 M^2
EST. GFR  (NON AFRICAN AMERICAN): 59.7 ML/MIN/1.73 M^2
GLUCOSE SERPL-MCNC: 118 MG/DL (ref 70–110)
HCT VFR BLD AUTO: 39.3 % (ref 40–54)
HDLC SERPL-MCNC: 42 MG/DL (ref 40–75)
HDLC SERPL: 20.9 % (ref 20–50)
HGB BLD-MCNC: 12.2 G/DL (ref 14–18)
IMM GRANULOCYTES # BLD AUTO: 0.01 K/UL (ref 0–0.04)
IMM GRANULOCYTES NFR BLD AUTO: 0.2 % (ref 0–0.5)
LDLC SERPL CALC-MCNC: 123.8 MG/DL (ref 63–159)
LYMPHOCYTES # BLD AUTO: 1.1 K/UL (ref 1–4.8)
LYMPHOCYTES NFR BLD: 19.8 % (ref 18–48)
MCH RBC QN AUTO: 29.8 PG (ref 27–31)
MCHC RBC AUTO-ENTMCNC: 31 G/DL (ref 32–36)
MCV RBC AUTO: 96 FL (ref 82–98)
MONOCYTES # BLD AUTO: 0.9 K/UL (ref 0.3–1)
MONOCYTES NFR BLD: 17 % (ref 4–15)
NEUTROPHILS # BLD AUTO: 2.9 K/UL (ref 1.8–7.7)
NEUTROPHILS NFR BLD: 53.1 % (ref 38–73)
NONHDLC SERPL-MCNC: 159 MG/DL
NRBC BLD-RTO: 0 /100 WBC
PLATELET # BLD AUTO: 317 K/UL (ref 150–350)
PMV BLD AUTO: 10.8 FL (ref 9.2–12.9)
POTASSIUM SERPL-SCNC: 3.9 MMOL/L (ref 3.5–5.1)
PROT SERPL-MCNC: 7.5 G/DL (ref 6–8.4)
RBC # BLD AUTO: 4.1 M/UL (ref 4.6–6.2)
SODIUM SERPL-SCNC: 137 MMOL/L (ref 136–145)
TRIGL SERPL-MCNC: 176 MG/DL (ref 30–150)
WBC # BLD AUTO: 5.36 K/UL (ref 3.9–12.7)

## 2020-07-31 PROCEDURE — 80053 COMPREHEN METABOLIC PANEL: CPT

## 2020-07-31 PROCEDURE — 87536 HIV-1 QUANT&REVRSE TRNSCRPJ: CPT

## 2020-07-31 PROCEDURE — 86592 SYPHILIS TEST NON-TREP QUAL: CPT

## 2020-07-31 PROCEDURE — 85025 COMPLETE CBC W/AUTO DIFF WBC: CPT

## 2020-07-31 PROCEDURE — 36415 COLL VENOUS BLD VENIPUNCTURE: CPT | Mod: PO

## 2020-07-31 PROCEDURE — 80307 DRUG TEST PRSMV CHEM ANLYZR: CPT

## 2020-07-31 PROCEDURE — 80061 LIPID PANEL: CPT

## 2020-07-31 PROCEDURE — 87901 NFCT AGT GNTYP ALYS HIV1 REV: CPT

## 2020-07-31 PROCEDURE — 86361 T CELL ABSOLUTE COUNT: CPT

## 2020-08-01 LAB
AMPHET+METHAMPHET UR QL: NEGATIVE
BARBITURATES UR QL SCN>200 NG/ML: NEGATIVE
BENZODIAZ UR QL SCN>200 NG/ML: NEGATIVE
BZE UR QL SCN: NEGATIVE
CANNABINOIDS UR QL SCN: NEGATIVE
CREAT UR-MCNC: 184 MG/DL (ref 23–375)
ETHANOL UR-MCNC: <10 MG/DL
METHADONE UR QL SCN>300 NG/ML: NEGATIVE
OPIATES UR QL SCN: NEGATIVE
PCP UR QL SCN>25 NG/ML: NEGATIVE
RPR SER QL: NORMAL
TOXICOLOGY INFORMATION: NORMAL

## 2020-08-03 LAB
CD3+CD4+ CELLS # BLD: 74 CELLS/UL (ref 300–1400)
CD3+CD4+ CELLS NFR BLD: 6.4 % (ref 28–57)

## 2020-08-09 LAB
HIV GENTYP ISLT: DETECTED
HIV UQ DATE RECEIVED: ABNORMAL
HIV UQ DATE REPORTED: ABNORMAL
HIV1 RNA # SERPL NAA+PROBE: ABNORMAL COPIES/ML
HIV1 RNA SERPL NAA+PROBE-LOG#: 3.65 LOG (10) COPIES/ML
HIV1 RNA SERPL QL NAA+PROBE: DETECTED

## 2020-09-07 ENCOUNTER — HOSPITAL ENCOUNTER (EMERGENCY)
Facility: HOSPITAL | Age: 60
Discharge: HOME | End: 2020-09-07
Attending: EMERGENCY MEDICINE
Payer: MEDICAID

## 2020-09-07 VITALS
HEIGHT: 69 IN | WEIGHT: 170 LBS | TEMPERATURE: 99 F | SYSTOLIC BLOOD PRESSURE: 180 MMHG | HEART RATE: 80 BPM | RESPIRATION RATE: 18 BRPM | DIASTOLIC BLOOD PRESSURE: 90 MMHG | BODY MASS INDEX: 25.18 KG/M2 | OXYGEN SATURATION: 99 %

## 2020-09-07 DIAGNOSIS — M25.562 BILATERAL KNEE PAIN: Primary | ICD-10-CM

## 2020-09-07 DIAGNOSIS — M25.561 BILATERAL KNEE PAIN: Primary | ICD-10-CM

## 2020-09-07 PROCEDURE — 96372 THER/PROPH/DIAG INJ SC/IM: CPT

## 2020-09-07 PROCEDURE — 63600175 PHARM REV CODE 636 W HCPCS: Performed by: PHYSICIAN ASSISTANT

## 2020-09-07 PROCEDURE — 99284 EMERGENCY DEPT VISIT MOD MDM: CPT | Mod: 25

## 2020-09-07 RX ORDER — KETOROLAC TROMETHAMINE 30 MG/ML
30 INJECTION, SOLUTION INTRAMUSCULAR; INTRAVENOUS
Status: COMPLETED | OUTPATIENT
Start: 2020-09-07 | End: 2020-09-07

## 2020-09-07 RX ORDER — LIDOCAINE 50 MG/G
1 PATCH TOPICAL DAILY
Qty: 15 PATCH | Refills: 0 | Status: SHIPPED | OUTPATIENT
Start: 2020-09-07 | End: 2021-01-29

## 2020-09-07 RX ORDER — TRAMADOL HYDROCHLORIDE AND ACETAMINOPHEN 37.5; 325 MG/1; MG/1
1 TABLET, FILM COATED ORAL EVERY 4 HOURS PRN
Qty: 10 TABLET | Refills: 0 | Status: SHIPPED | OUTPATIENT
Start: 2020-09-07 | End: 2020-10-02

## 2020-09-07 RX ORDER — ASPIRIN 81 MG/1
TABLET ORAL
Status: ON HOLD | COMMUNITY
Start: 2018-07-06 | End: 2021-05-05 | Stop reason: HOSPADM

## 2020-09-07 RX ADMIN — KETOROLAC TROMETHAMINE 30 MG: 30 INJECTION, SOLUTION INTRAMUSCULAR at 11:09

## 2020-09-07 NOTE — ED PROVIDER NOTES
Encounter Date: 9/7/2020       History     Chief Complaint   Patient presents with    Knee Pain     pt fell on ground on sully knees yesterday, c/o sully knee pain and difficulty ambulating.     Andrew Velazquez, a 59 y.o. male  has a past medical history of Arthritis, Back pain, Chronic kidney disease, stage 3, COPD (chronic obstructive pulmonary disease), Esophageal reflux, HIV (human immunodeficiency virus infection), Hypertension, Lumbago, Psoriasis, Skin disorder, and Tobacco use.     He presents to the ED evaluation of bilateral knee pain after falling while trying to get out of a chair yesterday.  Denies any trauma to head.  States that he fell to bilateral knees and since that time has suffered with increased pain with ambulation.  States that he will suffer with intermittent flares of pain to bilateral knees and when he is evaluated for this pain, recieves a IM treatment of an unknown medication that usually makes him feel much better.  Denies previous history of fracture or surgery to site.  No treatments tried PTA.        The history is provided by the patient.     Review of patient's allergies indicates:   Allergen Reactions    Penicillins Hives    Nsaids (non-steroidal anti-inflammatory drug)      CKD    Penicillin     Sustiva [efavirenz] Other (See Comments)     insomnia     Past Medical History:   Diagnosis Date    Arthritis     Back pain     Chronic kidney disease, stage 3     COPD (chronic obstructive pulmonary disease)     Esophageal reflux     HIV (human immunodeficiency virus infection)     2006    Hypertension     Lumbago     Psoriasis     Skin disorder     Tobacco use      Past Surgical History:   Procedure Laterality Date    FINGER SURGERY      TONSILLECTOMY       Family History   Problem Relation Age of Onset    Arthritis Mother     Hypertension Mother     Hypertension Sister     Arthritis Sister     No Known Problems Father      Social History     Tobacco Use    Smoking  status: Current Every Day Smoker     Packs/day: 0.25     Years: 40.00     Pack years: 10.00     Types: Cigarettes    Smokeless tobacco: Never Used   Substance Use Topics    Alcohol use: Yes     Alcohol/week: 0.0 standard drinks     Comment: occassional beer; pt drank beer this am    Drug use: Yes     Types: Marijuana     Comment: occasional use of marijuana     Review of Systems   Constitutional: Negative for fever.   Cardiovascular: Negative for leg swelling.   Musculoskeletal: Positive for arthralgias.   Skin: Negative for color change and wound.   Allergic/Immunologic: Negative for immunocompromised state.   Neurological: Negative for dizziness, weakness and numbness.   Hematological: Negative for adenopathy.   Psychiatric/Behavioral: Negative for agitation.   All other systems reviewed and are negative.      Physical Exam     Initial Vitals [09/07/20 1033]   BP Pulse Resp Temp SpO2   (!) 180/90 80 18 98.6 °F (37 °C) 99 %      MAP       --         Physical Exam    Nursing note and vitals reviewed.  Constitutional: He appears well-developed and well-nourished.   HENT:   Head: Normocephalic and atraumatic.   Right Ear: External ear normal.   Left Ear: External ear normal.   Nose: Nose normal.   Eyes: EOM are normal.   Neck: Normal range of motion. Neck supple.   Cardiovascular: Normal rate and regular rhythm.   Pulmonary/Chest: Breath sounds normal. No respiratory distress.   Musculoskeletal: No edema.      Right knee: He exhibits decreased range of motion. He exhibits no swelling, no effusion, no ecchymosis, no deformity, no laceration, no erythema, normal alignment, no LCL laxity, normal patellar mobility, no bony tenderness, normal meniscus and no MCL laxity. Tenderness found. Medial joint line tenderness noted. No lateral joint line, no MCL, no LCL and no patellar tendon tenderness noted.      Left knee: He exhibits decreased range of motion. He exhibits no swelling, no effusion, no ecchymosis, no deformity,  no laceration, no erythema, normal alignment, no LCL laxity, normal patellar mobility, no bony tenderness, normal meniscus and no MCL laxity. Tenderness found. Medial joint line tenderness noted. No lateral joint line, no MCL, no LCL and no patellar tendon tenderness noted.   Neurological: He is alert and oriented to person, place, and time. No cranial nerve deficit.   Skin: Skin is warm and dry. No rash and no abscess noted. No erythema.   Psychiatric: He has a normal mood and affect. Thought content normal.         ED Course   Procedures  Labs Reviewed - No data to display       Imaging Results          X-Ray Knee 3 View Bilateral (Final result)  Result time 09/07/20 11:26:11    Final result by Damion Huizar MD (09/07/20 11:26:11)                 Impression:      Bilateral tricompartmental degenerative changes, right side greater than left.  No acute bony abnormality.      Electronically signed by: Damion Huizar MD  Date:    09/07/2020  Time:    11:26             Narrative:    EXAMINATION:  XR KNEE 3 VIEW BILATERAL    CLINICAL HISTORY:  Pain in right knee    TECHNIQUE:  AP, lateral, and Merchant views of both knees were performed.    COMPARISON:  Left knee radiograph, 12/14/2019.  Bilateral knee radiographs, 07/31/2019.    FINDINGS:  Right knee: Moderate tricompartmental degenerative changes, most pronounced in the lateral compartment.  No acute fracture or dislocation.  Soft tissues are symmetric.  No large joint effusion.  Vascular calcifications are present.  No unexpected radiopaque foreign body.    Left knee: Mild-to-moderate tricompartmental degenerative changes.  No acute fracture or dislocation.  Soft tissues are symmetric.  No sizeable joint effusion.  Vascular calcifications are present.  No unexpected radiopaque foreign body.                                 Medical Decision Making:   Initial Assessment:   Bilateral knee pain after fall   Differential Diagnosis:   Internal derangement, fracture,  OA   Clinical Tests:   Radiological Study: Ordered and Reviewed  ED Management:  Pt presents to ED for evaluation of bilateral knee pain after fall.  TTP to medal joint line bilaterally.  No swelling.  NVI.  X-ray shows OA.  Toradol given in ED.  Pt was given information on symptom control and reasons for return.  He verbalized understanding and agreement with plan.                                   Clinical Impression:       ICD-10-CM ICD-9-CM   1. Bilateral knee pain  M25.561 719.46    M25.562              ED Disposition Condition    Discharge Stable        ED Prescriptions     Medication Sig Dispense Start Date End Date Auth. Provider    tramadol-acetaminophen 37.5-325 mg (ULTRACET) 37.5-325 mg Tab Take 1 tablet by mouth every 4 (four) hours as needed for Pain. 10 tablet 9/7/2020  Robyn Braxton PA-C    lidocaine (LIDODERM) 5 % Place 1 patch onto the skin once daily. Remove & Discard patch within 12 hours or as directed by MD 15 patch 9/7/2020  Robyn Braxton PA-C        Follow-up Information     Follow up With Specialties Details Why Contact Info    Marisol Tejada MD Infectious Diseases, Obstetrics and Gynecology In 3 days  [address] [address]  [phone]                                       Robyn Braxton PA-C  09/07/20 [id number]

## 2020-09-14 ENCOUNTER — PATIENT OUTREACH (OUTPATIENT)
Dept: EMERGENCY MEDICINE | Facility: HOSPITAL | Age: 60
End: 2020-09-14

## 2020-09-14 NOTE — Clinical Note
Mailed Medicaid transportation resource, dental resources, utility assistance resource and [facility] 24/7 Nurse triage line, [phone] or 1-[facility] ([phone]).    Pt already made an appointment with: Marisol Tejada MD for 10/2/20.

## 2020-09-22 ENCOUNTER — LAB VISIT (OUTPATIENT)
Dept: LAB | Facility: HOSPITAL | Age: 60
End: 2020-09-22
Attending: INTERNAL MEDICINE
Payer: MEDICAID

## 2020-09-22 DIAGNOSIS — M54.50 CHRONIC MIDLINE LOW BACK PAIN WITHOUT SCIATICA: ICD-10-CM

## 2020-09-22 DIAGNOSIS — G89.29 CHRONIC MIDLINE LOW BACK PAIN WITHOUT SCIATICA: ICD-10-CM

## 2020-09-22 PROCEDURE — 80307 DRUG TEST PRSMV CHEM ANLYZR: CPT

## 2020-09-23 LAB
AMPHET+METHAMPHET UR QL: NEGATIVE
BARBITURATES UR QL SCN>200 NG/ML: NEGATIVE
BENZODIAZ UR QL SCN>200 NG/ML: NEGATIVE
BZE UR QL SCN: NEGATIVE
CANNABINOIDS UR QL SCN: NEGATIVE
CREAT UR-MCNC: 277 MG/DL (ref 23–375)
ETHANOL UR-MCNC: <10 MG/DL
METHADONE UR QL SCN>300 NG/ML: NEGATIVE
OPIATES UR QL SCN: NEGATIVE
PCP UR QL SCN>25 NG/ML: NEGATIVE
TOXICOLOGY INFORMATION: NORMAL

## 2020-09-30 NOTE — PROGRESS NOTES
Gema Navas MA  ED Navigator  Emergency Department    Project: [facility] ED Navigator  Role: ED Navigator    Date: 09/30/2020  Patient Name: Andrew Velazquez  MRN: [id number]  PCP: Marisol Tejada MD    Assessment:     Andrew Velazquez is a 60 y.o. male who has presented to ED for No chief complaint on file.  . Patient has visited the ED 1 times in the past 3 months. Patient did contact PCP.     ED Navigator Patient Assessment    Consent to Services  Does patient consent to completing the assessment?: Yes  Transportation  Does the patient have issues with Transportation?: Yes  Can family, friends, or others help?: Yes  Lack of transportation to appts, pharmacy, etc.?: Yes  What type of assistance is needed?: Standard (RTA/MITS)  What is available in their region?: bus  Insurance Coverage  Do you have coverage/adequate coverage?: Yes  Type/kind of coverage: Medicaid  Is patient able to afford co-pays/deductibles?: Yes  Is patient able to afford HME or supplies?: Yes  Does patient have an established [facility] PCP?: Yes  Able to access?: No (Comment: Provided pt with direct number to call)  Does the patient have a lack of adequate coverage?: No  Specialist Appointment  Did the patient come to the ED to see a specialist?: No  Does the patient have a pending specialist referral?: No  Does the patient have a specialist appointment made?: No  PCP Follow Up Appointment  Does the patient have a follow up appontment with their PCP?: Yes  When was the last time you saw your PCP?: 6/23/20  Medications  Is patient able to afford medication?: Yes  Is patient unable to get medication due to lack of transportation?: No (Comment: Pt makes sure to get his medication)  Psychological  Does the patient have psycho-social concerns?: No  Food  Does the patient have concerns about food?: No  Communication/Education  Does the patient have limited English proficiency/English not primary language?: No  Does patient have low literacy and/or low health  literacy?: No  Does patient have concerns with care?: No  Does patient have dissatisfaction with care?: No  Other Financial Concers  Does the patient have immediate financial distress?: No  Does the patient have general financial concerns?: No  Other Social Barriers/Concerns  Does the patient have any additional barriers or concerns?: Unable to afford utilities, Dental         Social History     Socioeconomic History    Marital status:      Spouse name: Not on file    Number of children: Not on file    Years of education: 14    Highest education level: Not on file   Occupational History    Not on file   Social Needs    Financial resource strain: Hard    Food insecurity     Worry: Patient refused     Inability: Patient refused    Transportation needs     Medical: No     Non-medical: No   Tobacco Use    Smoking status: Current Every Day Smoker     Packs/day: 0.25     Years: 40.00     Pack years: 10.00     Types: Cigarettes    Smokeless tobacco: Never Used   Substance and Sexual Activity    Alcohol use: Yes     Alcohol/week: 0.0 standard drinks     Comment: occassional beer; pt drank beer this am    Drug use: Yes     Types: Marijuana     Comment: occasional use of marijuana    Sexual activity: Never     Partners: Male     Birth control/protection: Condom   Lifestyle    Physical activity     Days per week: 3 days     Minutes per session: 20 min    Stress: Rather much   Relationships    Social connections     Talks on phone: More than three times a week     Gets together: More than three times a week     Attends [employer] service: More than 4 times per year     Active member of club or organization: No     Attends meetings of clubs or organizations: Never     Relationship status:    Other Topics Concern    Not on file   Social History Narrative    Not on file       Plan:   Mailed Medicaid transportation resource, dental resources, utility assistance resource and [facility] 24[facility]7  Nurse triage line, [phone] or 1-[facility] ([phone]).    Pt already made an appointment with: Marisol Tejada MD for 10/2/20.

## 2020-10-02 PROBLEM — N17.9 AKI (ACUTE KIDNEY INJURY): Status: RESOLVED | Noted: 2020-05-19 | Resolved: 2020-10-02

## 2020-10-13 ENCOUNTER — PATIENT OUTREACH (OUTPATIENT)
Dept: EMERGENCY MEDICINE | Facility: HOSPITAL | Age: 60
End: 2020-10-13

## 2020-10-13 NOTE — PROGRESS NOTES
1st follow up.  Pt states that his sister gets the mail.  He will check with her to see if she received the resources.  Will follow up in a couple of days (week of 10/13/20).

## 2020-10-15 ENCOUNTER — PATIENT OUTREACH (OUTPATIENT)
Dept: EMERGENCY MEDICINE | Facility: HOSPITAL | Age: 60
End: 2020-10-15

## 2020-10-22 ENCOUNTER — LAB VISIT (OUTPATIENT)
Dept: LAB | Facility: HOSPITAL | Age: 60
End: 2020-10-22
Attending: INTERNAL MEDICINE
Payer: MEDICAID

## 2020-10-22 DIAGNOSIS — M15.9 PRIMARY OSTEOARTHRITIS INVOLVING MULTIPLE JOINTS: ICD-10-CM

## 2020-10-22 PROCEDURE — 80307 DRUG TEST PRSMV CHEM ANLYZR: CPT

## 2020-10-22 NOTE — PROGRESS NOTES
2nd follow up. Attempted to contact patient on 3 separate occasions, patient is unable to reach. ED Navigator to close encounter at this time.

## 2020-10-23 LAB
AMPHET+METHAMPHET UR QL: NEGATIVE
BARBITURATES UR QL SCN>200 NG/ML: NEGATIVE
BENZODIAZ UR QL SCN>200 NG/ML: NEGATIVE
BZE UR QL SCN: NEGATIVE
CANNABINOIDS UR QL SCN: NEGATIVE
CREAT UR-MCNC: 125 MG/DL (ref 23–375)
ETHANOL UR-MCNC: <10 MG/DL
METHADONE UR QL SCN>300 NG/ML: NEGATIVE
OPIATES UR QL SCN: NEGATIVE
PCP UR QL SCN>25 NG/ML: NEGATIVE
TOXICOLOGY INFORMATION: NORMAL

## 2020-11-11 ENCOUNTER — OFFICE VISIT (OUTPATIENT)
Dept: URGENT CARE | Facility: CLINIC | Age: 60
End: 2020-11-11
Payer: MEDICAID

## 2020-11-11 VITALS
BODY MASS INDEX: 25.18 KG/M2 | OXYGEN SATURATION: 99 % | HEIGHT: 69 IN | WEIGHT: 170 LBS | DIASTOLIC BLOOD PRESSURE: 120 MMHG | HEART RATE: 46 BPM | SYSTOLIC BLOOD PRESSURE: 230 MMHG | TEMPERATURE: 99 F

## 2020-11-11 DIAGNOSIS — H57.89 IRRITATION OF LEFT EYE: ICD-10-CM

## 2020-11-11 DIAGNOSIS — Z53.20 PATIENT REFUSED EVALUATION OR TREATMENT: ICD-10-CM

## 2020-11-11 DIAGNOSIS — I16.1 HYPERTENSIVE EMERGENCY: Primary | ICD-10-CM

## 2020-11-11 PROCEDURE — 99203 OFFICE O/P NEW LOW 30 MIN: CPT | Mod: S$GLB,,, | Performed by: NURSE PRACTITIONER

## 2020-11-11 PROCEDURE — 99203 PR OFFICE/OUTPT VISIT, NEW, LEVL III, 30-44 MIN: ICD-10-PCS | Mod: S$GLB,,, | Performed by: NURSE PRACTITIONER

## 2020-11-11 RX ORDER — CLONIDINE HYDROCHLORIDE 0.1 MG/1
0.1 TABLET ORAL
Status: COMPLETED | OUTPATIENT
Start: 2020-11-11 | End: 2020-11-11

## 2020-11-11 RX ADMIN — CLONIDINE HYDROCHLORIDE 0.1 MG: 0.1 TABLET ORAL at 04:11

## 2020-11-11 NOTE — PATIENT INSTRUCTIONS
Patient's BP is elevated today. Patient did not take their  medication today.  No headache, Chest pain, or SOB.  Patient is advised to obtain further evaluation for elevated BP.   If your condition worsens or fails to improve we recommend that you receive another evaluation at the ER immediately or contact your PCP to discuss your concerns or return here. You must understand that you've received an urgent care treatment only and that you may be released before all your medical problems are known or treated. You the patient will arrange for followup care as instructed.    If you were prescribed antibiotics, please take them to completion.  If you were prescribed a narcotic medication, do not drive or operate heavy equipment or machinery while taking these medications.  Please follow up with your primary care doctor or specialist as needed.  If you  smoke, please stop smoking.      Uncontrolled High Blood Pressure (Established)    Your blood pressure was unusually high today. This can occur if youve missed doses of your blood pressure medicine. Or it can happen if you are taking other medicines. These include some asthma inhalers, decongestants, diet pills, and street drugs like cocaine and amphetamine.  Other causes include:  · Weight gain  · More salt in your diet  · Smoking  · Caffeine  Your blood pressure can also rise if you are emotionally upset or in intense pain. It may go back to normal after a period of rest.  A blood pressure reading is made up of 2 numbers. There is a top number over a bottom number. The top number is the systolic pressure. The bottom number is the diastolic pressure. A normal blood pressure is a systolic pressure of less than 120 over a diastolic pressure of less than 80. High blood pressure (hypertension) is when the top number is 140 or higher. Or it is when the bottom number is 90 or higher. You will see your blood pressure readings written together. For example, a person with a  systolic pressure of 118 and a diastolic pressure of 78 will have 118/78 written in the medical record. To be high blood pressure, the numbers must be higher when tested over a period of time. The blood pressures between normal and hypertension are called prehypertension. Prehypertension is a warning sign. The information gives you a chance to make lifestyle changes (weight loss, more exercise) that can keep your blood pressure from going higher.  Home care  Its important to take steps to lower your blood pressure. If you are taking blood pressure medicine, the guidelines below may help you need less or no medicines in the future.  · Begin a weight-loss program if you are overweight.  · Cut back on the amount of salt in your diet:  ¨ Avoid high-salt foods like olives, pickles, smoked meats, and salted potato chips.  ¨ Dont add salt to your food at the table.  ¨ Use only small amounts of salt when cooking.  · Begin an exercise program. Talk with your health care provider about what exercise program is best for you. It doesnt have to be difficult. Even brisk walking for 20 minutes 3 times a week is a good form of exercise.  · Avoid medicines that stimulates the heart. This includes many over-the-counter cold and sinus decongestant pills and sprays, as well as diet pills. Check the warnings about hypertension on the label. Before purchasing any over-the-counter medicines or supplements, always ask the pharmacist about the product's potential interaction with your high blood pressure and your medicines.  · Stimulants such as amphetamine or cocaine could be lethal for someone with hypertension. Never take these.  · Limit how much caffeine you drink. Or switch to noncaffeinated beverages.  · Stop smoking. If you are a long-time smoker, this can be hard. Enroll in a stop-smoking program to make it more likely that you will succeed. Talk with your provider about ways to quit.  · Learn how to handle stress better. This is  an important part of any program to lower blood pressure. Learn ways to relax. These include meditation, yoga, and biofeedback.  · If medicines were prescribed, take them exactly as directed. Missing doses may cause your blood pressure to get out of control.  · If you miss a dose or doses of your medicines, check with your healthcare provider or pharmacist about what to do.  · Consider buying an automatic blood pressure machine. Your provider may recommend a certain type. You can get one of these at most pharmacies. Measure your blood pressure twice a day, in the morning, and in the late afternoon. Keep a written record of your home blood pressure readings and take the record to your medical appointments.  Here are some additional guidelines on home blood pressure monitoring from the American [employer] Association.  · Don't smoke or drink coffee for 30 minutes  · Go to the bathroom before the test.  · Relax for 5 minutes before taking the measurement.  · Sit correctly. Be sure your back is supported. Don't sit on a couch or soft chair. Uncross your feet and place them flat on the floor. Place your arm on a solid, flat surface like a table with the upper arm at heart level. Make certain the middle of the cuff is directly above the eye of the elbow. Check the monitor's instruction manual for an illustration.  · Take multiple readings. When you measure, take 2 or 3 readings one minute apart and record all of the results.  · Take your blood pressure at the same time every day, or as your healthcare provider recommends.  · Record the date, time, and blood pressure reading.  · Take the record with you to your next appointment. If your blood pressure monitor has a built-in memory, simply take the monitor with you to your next appointment.  · Call your provider if you have several high readings. Don't be frightened by a single high reading, but if you get several high readings, check in with your healthcare provider.  · Note:  When blood pressure reaches a systolic (top number) of 180 or higher or a diastolic (bottom number) of 110 or higher, emergency medical treatment is required. Call your healthcare provider immediately.  Follow-up care  Regular visits to your own healthcare provider for blood pressure and medicine checks are an important part of your care. Make a follow-up appointment as directed. Bring the record of your home blood pressure readings to the appointment.  When to seek medical advice  Call your healthcare provider right away if any of these occur:  · Blood pressure reaches a systolic (top number) of 180 or higher or diastolic (bottom number) of 110 or higher, emergency medical treatment is required.  · Chest, arm, shoulder, neck, or upper back pain  · Shortness of breath  · Severe headache  · Throbbing or rushing sound in the ears  · Nosebleed  · Extreme drowsiness, confusion, or fainting  · Dizziness or dizziness with spinning sensation (vertigo)  · Weakness in an arm or leg or on one side of the face  · Trouble speaking or seeing   Date Last Reviewed: 1/1/2017  © 1189-4380 [employer]. [address] [place] [address], [place] [address]. All rights reserved. This information is not intended as a substitute for professional medical care. Always follow your healthcare professional's instructions.

## 2020-11-11 NOTE — PROGRESS NOTES
"Subjective:       Patient ID: Andrew Velazquez is a 60 y.o. male.    Vitals:  height is 5' 9" (1.753 m) and weight is 77.1 kg (170 lb). His temperature is 98.6 °F (37 °C). His blood pressure is 230/120 (abnormal) and his pulse is 46 (abnormal). His oxygen saturation is 99%.     Chief Complaint: Eye Pain (left)    This is a 60 y.o. male with history of neuropathy, COPD, hypertension, HLD, AIDS, iron deficiency anemia, GERD and tobacco abuse who presents today with a chief complaint of left eye pain since yesterday, patient reports he just want to get his eyes checked as he was grinding something and felt something in his left eye, upon triage patient blood pressure high in clinic, patient reports he did not took his blood pressure medication  this morning, patient denies nausea, vomiting, diarrhea, denies chest pain, exertional chest pain, radiating chest pain, denies cough or shortness of breath, denies dizziness or positional lightheadedness, denies palpitations, denies change in vision or blurred vision, denies hearing loss, denies worst headache ever or nuchal rigidity, denies slurred speech, denies facial weakness, radiating back or leg pain, denies radiating neck or arm pain denies numbness or tingling, denies saddle anesthesia, denies gait instability, denies hand  weakness, denies loss of finger dexterity, denies extremity weakness, denies hearing loss, denies focal deficits, denies any trauma fall or injury, denies weakness or fatigue, patient reports he has been dealing with his high blood pressure for 10-15 years            Eye Pain   The left eye is affected. This is a new problem. The current episode started yesterday. The problem has been gradually worsening. There was no injury mechanism. The pain is at a severity of 0/10. The patient is experiencing no pain. There is no known exposure to pink eye. He does not wear contacts. Associated symptoms include eye redness. He has tried nothing for the " symptoms. The treatment provided no relief.       Constitution: Negative for activity change.   HENT: Negative for hearing loss.    Neck: Negative for neck pain and painful lymph nodes.   Cardiovascular: Negative for chest trauma.   Eyes: Positive for eye redness. Negative for eye pain.   Respiratory: Negative for sleep apnea.    Gastrointestinal: Negative for abdominal trauma.   Endocrine: hair loss.   Genitourinary: Negative for dysuria.   Musculoskeletal: Negative for pain.   Skin: Negative for color change.   Allergic/Immunologic: Negative for environmental allergies.   Neurological: Negative for dizziness and altered mental status.   Hematologic/Lymphatic: Negative for swollen lymph nodes.   Psychiatric/Behavioral: Negative for altered mental status.       Objective:      Physical Exam   Constitutional: He is oriented to person, place, and time. He appears well-developed.  Non-toxic appearance. He does not appear ill. No distress.   HENT:   Head: Normocephalic and atraumatic.   Ears:   Right Ear: External ear normal.   Left Ear: External ear normal.   Nose: Nose normal.   Mouth/Throat: Oropharynx is clear and moist.   Eyes: Pupils are equal, round, and reactive to light. Conjunctivae, EOM and lids are normal. Right eye exhibits no chemosis, no discharge, no exudate and no hordeolum. No foreign body present in the right eye. Left eye exhibits no chemosis, no discharge, no exudate and no hordeolum. No foreign body present in the left eye. No visual field deficit is present. Right conjunctiva is not injected. Right conjunctiva has no hemorrhage. Left conjunctiva is not injected. Left conjunctiva has no hemorrhage. No scleral icterus. Right eye exhibits normal extraocular motion and no nystagmus. Left eye exhibits normal extraocular motion and no nystagmus.   Slit lamp exam:       The left eye shows no corneal abrasion and no fluorescein uptake. periorbital hyperpigmentation  Neck: Trachea normal, full passive  "range of motion without pain and phonation normal. Neck supple.   Musculoskeletal: Normal range of motion.   Neurological: He is alert and oriented to person, place, and time.   Skin: Skin is warm, dry, intact and not diaphoretic. Psychiatric: His speech is normal and behavior is normal. Judgment and thought content normal.   Nursing note and vitals reviewed.      Case discussed with Dr. Kuo.   Patient BP high in clinic.  Patient given 0.1 mg clonidine in clinic no improvement in blood pressure.  Rebound hypertension discussed with patient.  Patient Declined 2nd dose of clonidine. Patient stated, " I will go home and take my BP meds".  Patient declined any further evaluation and treatment in the clinic and ED referral for further treatment/ evaluation.  I strongly recommended patient further evaluation and refusing treatment can be life-threatening including stroke, MI, kidney injury.  After thorough discussion, Patient adamantly declined further evaluation and treatment in the clinic/referral ED and opted to go to the ER via personal vehicle.   I reiterated the importance of further evaluation.    Assessment:       1. Hypertensive emergency    2. Irritation of left eye    3. Patient refused evaluation or treatment        Plan:         Hypertensive emergency  -     cloNIDine tablet 0.1 mg    Irritation of left eye    Patient refused evaluation or treatment      Patient Instructions   Patient's BP is elevated today. Patient did not take their  medication today.  No headache, Chest pain, or SOB.  Patient is advised to obtain further evaluation for elevated BP.   If your condition worsens or fails to improve we recommend that you receive another evaluation at the ER immediately or contact your PCP to discuss your concerns or return here. You must understand that you've received an urgent care treatment only and that you may be released before all your medical problems are known or treated. You the patient will " arrange for followup care as instructed.    If you were prescribed antibiotics, please take them to completion.  If you were prescribed a narcotic medication, do not drive or operate heavy equipment or machinery while taking these medications.  Please follow up with your primary care doctor or specialist as needed.  If you  smoke, please stop smoking.      Uncontrolled High Blood Pressure (Established)    Your blood pressure was unusually high today. This can occur if youve missed doses of your blood pressure medicine. Or it can happen if you are taking other medicines. These include some asthma inhalers, decongestants, diet pills, and street drugs like cocaine and amphetamine.  Other causes include:  · Weight gain  · More salt in your diet  · Smoking  · Caffeine  Your blood pressure can also rise if you are emotionally upset or in intense pain. It may go back to normal after a period of rest.  A blood pressure reading is made up of 2 numbers. There is a top number over a bottom number. The top number is the systolic pressure. The bottom number is the diastolic pressure. A normal blood pressure is a systolic pressure of less than 120 over a diastolic pressure of less than 80. High blood pressure (hypertension) is when the top number is 140 or higher. Or it is when the bottom number is 90 or higher. You will see your blood pressure readings written together. For example, a person with a systolic pressure of 118 and a diastolic pressure of 78 will have 118/78 written in the medical record. To be high blood pressure, the numbers must be higher when tested over a period of time. The blood pressures between normal and hypertension are called prehypertension. Prehypertension is a warning sign. The information gives you a chance to make lifestyle changes (weight loss, more exercise) that can keep your blood pressure from going higher.  Home care  Its important to take steps to lower your blood pressure. If you are taking  blood pressure medicine, the guidelines below may help you need less or no medicines in the future.  · Begin a weight-loss program if you are overweight.  · Cut back on the amount of salt in your diet:  ¨ Avoid high-salt foods like olives, pickles, smoked meats, and salted potato chips.  ¨ Dont add salt to your food at the table.  ¨ Use only small amounts of salt when cooking.  · Begin an exercise program. Talk with your health care provider about what exercise program is best for you. It doesnt have to be difficult. Even brisk walking for 20 minutes 3 times a week is a good form of exercise.  · Avoid medicines that stimulates the heart. This includes many over-the-counter cold and sinus decongestant pills and sprays, as well as diet pills. Check the warnings about hypertension on the label. Before purchasing any over-the-counter medicines or supplements, always ask the pharmacist about the product's potential interaction with your high blood pressure and your medicines.  · Stimulants such as amphetamine or cocaine could be lethal for someone with hypertension. Never take these.  · Limit how much caffeine you drink. Or switch to noncaffeinated beverages.  · Stop smoking. If you are a long-time smoker, this can be hard. Enroll in a stop-smoking program to make it more likely that you will succeed. Talk with your provider about ways to quit.  · Learn how to handle stress better. This is an important part of any program to lower blood pressure. Learn ways to relax. These include meditation, yoga, and biofeedback.  · If medicines were prescribed, take them exactly as directed. Missing doses may cause your blood pressure to get out of control.  · If you miss a dose or doses of your medicines, check with your healthcare provider or pharmacist about what to do.  · Consider buying an automatic blood pressure machine. Your provider may recommend a certain type. You can get one of these at most pharmacies. Measure your  blood pressure twice a day, in the morning, and in the late afternoon. Keep a written record of your home blood pressure readings and take the record to your medical appointments.  Here are some additional guidelines on home blood pressure monitoring from the [employer].  · Don't smoke or drink coffee for 30 minutes  · Go to the bathroom before the test.  · Relax for 5 minutes before taking the measurement.  · Sit correctly. Be sure your back is supported. Don't sit on a couch or soft chair. Uncross your feet and place them flat on the floor. Place your arm on a solid, flat surface like a table with the upper arm at heart level. Make certain the middle of the cuff is directly above the eye of the elbow. Check the monitor's instruction manual for an illustration.  · Take multiple readings. When you measure, take 2 or 3 readings one minute apart and record all of the results.  · Take your blood pressure at the same time every day, or as your healthcare provider recommends.  · Record the date, time, and blood pressure reading.  · Take the record with you to your next appointment. If your blood pressure monitor has a built-in memory, simply take the monitor with you to your next appointment.  · Call your provider if you have several high readings. Don't be frightened by a single high reading, but if you get several high readings, check in with your healthcare provider.  · Note: When blood pressure reaches a systolic (top number) of 180 or higher or a diastolic (bottom number) of 110 or higher, emergency medical treatment is required. Call your healthcare provider immediately.  Follow-up care  Regular visits to your own healthcare provider for blood pressure and medicine checks are an important part of your care. Make a follow-up appointment as directed. Bring the record of your home blood pressure readings to the appointment.  When to seek medical advice  Call your healthcare provider right away if any  of these occur:  · Blood pressure reaches a systolic (top number) of 180 or higher or diastolic (bottom number) of 110 or higher, emergency medical treatment is required.  · Chest, arm, shoulder, neck, or upper back pain  · Shortness of breath  · Severe headache  · Throbbing or rushing sound in the ears  · Nosebleed  · Extreme drowsiness, confusion, or fainting  · Dizziness or dizziness with spinning sensation (vertigo)  · Weakness in an arm or leg or on one side of the face  · Trouble speaking or seeing   Date Last Reviewed: 1/1/2017  © 4066-3874 [employer]. [address] [place] [address]. All rights reserved. This information is not intended as a substitute for professional medical care. Always follow your healthcare professional's instructions.

## 2020-11-19 ENCOUNTER — LAB VISIT (OUTPATIENT)
Dept: LAB | Facility: HOSPITAL | Age: 60
End: 2020-11-19
Attending: INTERNAL MEDICINE
Payer: MEDICAID

## 2020-11-19 DIAGNOSIS — M15.9 PRIMARY OSTEOARTHRITIS INVOLVING MULTIPLE JOINTS: ICD-10-CM

## 2020-11-19 LAB
AMPHET+METHAMPHET UR QL: NEGATIVE
BARBITURATES UR QL SCN>200 NG/ML: NEGATIVE
BENZODIAZ UR QL SCN>200 NG/ML: NEGATIVE
BZE UR QL SCN: NEGATIVE
CANNABINOIDS UR QL SCN: NORMAL
CREAT UR-MCNC: 319 MG/DL (ref 23–375)
ETHANOL UR-MCNC: <10 MG/DL
METHADONE UR QL SCN>300 NG/ML: NEGATIVE
OPIATES UR QL SCN: NEGATIVE
PCP UR QL SCN>25 NG/ML: NEGATIVE
TOXICOLOGY INFORMATION: NORMAL

## 2020-11-19 PROCEDURE — 80307 DRUG TEST PRSMV CHEM ANLYZR: CPT

## 2020-12-17 ENCOUNTER — LAB VISIT (OUTPATIENT)
Dept: LAB | Facility: HOSPITAL | Age: 60
End: 2020-12-17
Attending: INTERNAL MEDICINE
Payer: MEDICAID

## 2020-12-17 DIAGNOSIS — M15.9 PRIMARY OSTEOARTHRITIS INVOLVING MULTIPLE JOINTS: ICD-10-CM

## 2020-12-17 DIAGNOSIS — Z86.73 OLD LACUNAR STROKE WITHOUT LATE EFFECT: Chronic | ICD-10-CM

## 2020-12-17 DIAGNOSIS — B20 AIDS (ACQUIRED IMMUNE DEFICIENCY SYNDROME): ICD-10-CM

## 2020-12-17 LAB
ALBUMIN SERPL BCP-MCNC: 3.5 G/DL (ref 3.5–5.2)
ALP SERPL-CCNC: 127 U/L (ref 55–135)
ALT SERPL W/O P-5'-P-CCNC: 11 U/L (ref 10–44)
ANION GAP SERPL CALC-SCNC: 7 MMOL/L (ref 8–16)
AST SERPL-CCNC: 20 U/L (ref 10–40)
BASOPHILS # BLD AUTO: 0.02 K/UL (ref 0–0.2)
BASOPHILS NFR BLD: 0.5 % (ref 0–1.9)
BILIRUB SERPL-MCNC: 0.8 MG/DL (ref 0.1–1)
BUN SERPL-MCNC: 23 MG/DL (ref 6–20)
CALCIUM SERPL-MCNC: 8.9 MG/DL (ref 8.7–10.5)
CHLORIDE SERPL-SCNC: 100 MMOL/L (ref 95–110)
CHOLEST SERPL-MCNC: 189 MG/DL (ref 120–199)
CHOLEST/HDLC SERPL: 4.2 {RATIO} (ref 2–5)
CO2 SERPL-SCNC: 30 MMOL/L (ref 23–29)
CREAT SERPL-MCNC: 1.6 MG/DL (ref 0.5–1.4)
DIFFERENTIAL METHOD: ABNORMAL
EOSINOPHIL # BLD AUTO: 0.2 K/UL (ref 0–0.5)
EOSINOPHIL NFR BLD: 5.8 % (ref 0–8)
ERYTHROCYTE [DISTWIDTH] IN BLOOD BY AUTOMATED COUNT: 15 % (ref 11.5–14.5)
EST. GFR  (AFRICAN AMERICAN): 53.3 ML/MIN/1.73 M^2
EST. GFR  (NON AFRICAN AMERICAN): 46.1 ML/MIN/1.73 M^2
GLUCOSE SERPL-MCNC: 87 MG/DL (ref 70–110)
HCT VFR BLD AUTO: 40.1 % (ref 40–54)
HDLC SERPL-MCNC: 45 MG/DL (ref 40–75)
HDLC SERPL: 23.8 % (ref 20–50)
HGB BLD-MCNC: 12.4 G/DL (ref 14–18)
IMM GRANULOCYTES # BLD AUTO: 0.02 K/UL (ref 0–0.04)
IMM GRANULOCYTES NFR BLD AUTO: 0.5 % (ref 0–0.5)
LDLC SERPL CALC-MCNC: 120.6 MG/DL (ref 63–159)
LYMPHOCYTES # BLD AUTO: 1.4 K/UL (ref 1–4.8)
LYMPHOCYTES NFR BLD: 33.2 % (ref 18–48)
MCH RBC QN AUTO: 28.6 PG (ref 27–31)
MCHC RBC AUTO-ENTMCNC: 30.9 G/DL (ref 32–36)
MCV RBC AUTO: 93 FL (ref 82–98)
MONOCYTES # BLD AUTO: 0.5 K/UL (ref 0.3–1)
MONOCYTES NFR BLD: 12.6 % (ref 4–15)
NEUTROPHILS # BLD AUTO: 2 K/UL (ref 1.8–7.7)
NEUTROPHILS NFR BLD: 47.4 % (ref 38–73)
NONHDLC SERPL-MCNC: 144 MG/DL
NRBC BLD-RTO: 0 /100 WBC
PLATELET # BLD AUTO: 217 K/UL (ref 150–350)
PMV BLD AUTO: 12.1 FL (ref 9.2–12.9)
POTASSIUM SERPL-SCNC: 4.2 MMOL/L (ref 3.5–5.1)
PROT SERPL-MCNC: 8.4 G/DL (ref 6–8.4)
RBC # BLD AUTO: 4.33 M/UL (ref 4.6–6.2)
SODIUM SERPL-SCNC: 137 MMOL/L (ref 136–145)
TRIGL SERPL-MCNC: 117 MG/DL (ref 30–150)
WBC # BLD AUTO: 4.13 K/UL (ref 3.9–12.7)

## 2020-12-17 PROCEDURE — 86480 TB TEST CELL IMMUN MEASURE: CPT

## 2020-12-17 PROCEDURE — 80053 COMPREHEN METABOLIC PANEL: CPT

## 2020-12-17 PROCEDURE — 80061 LIPID PANEL: CPT

## 2020-12-17 PROCEDURE — 85025 COMPLETE CBC W/AUTO DIFF WBC: CPT

## 2020-12-17 PROCEDURE — 86592 SYPHILIS TEST NON-TREP QUAL: CPT

## 2020-12-17 PROCEDURE — 86803 HEPATITIS C AB TEST: CPT

## 2020-12-17 PROCEDURE — 86361 T CELL ABSOLUTE COUNT: CPT

## 2020-12-17 PROCEDURE — 36415 COLL VENOUS BLD VENIPUNCTURE: CPT | Mod: PO

## 2020-12-17 PROCEDURE — 87536 HIV-1 QUANT&REVRSE TRNSCRPJ: CPT

## 2020-12-18 LAB
CD3+CD4+ CELLS # BLD: 74 CELLS/UL (ref 300–1400)
CD3+CD4+ CELLS NFR BLD: 5.4 % (ref 28–57)
HCV AB SERPL QL IA: NEGATIVE
RPR SER QL: NORMAL

## 2020-12-21 LAB
GAMMA INTERFERON BACKGROUND BLD IA-ACNC: 0.04 IU/ML
HIV UQ DATE RECEIVED: ABNORMAL
HIV UQ DATE REPORTED: ABNORMAL
HIV1 RNA # SERPL NAA+PROBE: ABNORMAL COPIES/ML
HIV1 RNA SERPL NAA+PROBE-LOG#: 3.82 LOG (10) COPIES/ML
HIV1 RNA SERPL QL NAA+PROBE: DETECTED
M TB IFN-G CD4+ BCKGRND COR BLD-ACNC: 0 IU/ML
MITOGEN IGNF BCKGRD COR BLD-ACNC: 4.2 IU/ML
TB GOLD PLUS: NEGATIVE
TB2 - NIL: 0 IU/ML

## 2021-01-01 ENCOUNTER — TELEPHONE (OUTPATIENT)
Dept: SMOKING CESSATION | Facility: CLINIC | Age: 61
End: 2021-01-01

## 2021-01-01 ENCOUNTER — PATIENT MESSAGE (OUTPATIENT)
Dept: ADMINISTRATIVE | Facility: OTHER | Age: 61
End: 2021-01-01

## 2021-01-01 ENCOUNTER — HOSPITAL ENCOUNTER (OUTPATIENT)
Facility: HOSPITAL | Age: 61
Discharge: HOME HEALTH | End: 2021-12-10
Attending: EMERGENCY MEDICINE | Admitting: EMERGENCY MEDICINE
Payer: MEDICAID

## 2021-01-01 VITALS
RESPIRATION RATE: 18 BRPM | DIASTOLIC BLOOD PRESSURE: 99 MMHG | OXYGEN SATURATION: 98 % | HEART RATE: 53 BPM | SYSTOLIC BLOOD PRESSURE: 178 MMHG | WEIGHT: 153.88 LBS | TEMPERATURE: 97 F | HEIGHT: 69 IN | BODY MASS INDEX: 22.79 KG/M2

## 2021-01-01 DIAGNOSIS — M25.552 ACUTE HIP PAIN, LEFT: ICD-10-CM

## 2021-01-01 DIAGNOSIS — R03.0 ELEVATED BLOOD PRESSURE READING: ICD-10-CM

## 2021-01-01 DIAGNOSIS — I10 HYPERTENSION: ICD-10-CM
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
## 2021-01-01 DIAGNOSIS — D64.9 ANEMIA, UNSPECIFIED TYPE: Primary | ICD-10-CM

## 2021-01-01 DIAGNOSIS — R07.9 CHEST PAIN: ICD-10-CM

## 2021-01-01 LAB
25(OH)D3+25(OH)D2 SERPL-MCNC: 11 NG/ML (ref 30–96)
ALBUMIN SERPL BCP-MCNC: 3.2 G/DL (ref 3.5–5.2)
ALBUMIN SERPL BCP-MCNC: 3.5 G/DL (ref 3.5–5.2)
ALP SERPL-CCNC: 112 U/L (ref 55–135)
ALP SERPL-CCNC: 127 U/L (ref 55–135)
ALT SERPL W/O P-5'-P-CCNC: 11 U/L (ref 10–44)
ALT SERPL W/O P-5'-P-CCNC: 11 U/L (ref 10–44)
ANION GAP SERPL CALC-SCNC: 12 MMOL/L (ref 8–16)
ANION GAP SERPL CALC-SCNC: 13 MMOL/L (ref 8–16)
ANISOCYTOSIS BLD QL SMEAR: SLIGHT
APTT BLDCRRT: 31.3 SEC (ref 21–32)
AST SERPL-CCNC: 18 U/L (ref 10–40)
AST SERPL-CCNC: 23 U/L (ref 10–40)
BACTERIA #/AREA URNS AUTO: NORMAL /HPF
BASOPHILS # BLD AUTO: 0.01 K/UL (ref 0–0.2)
BASOPHILS # BLD AUTO: 0.09 K/UL (ref 0–0.2)
BASOPHILS NFR BLD: 0.2 % (ref 0–1.9)
BASOPHILS NFR BLD: 2.3 % (ref 0–1.9)
BICTEGRAVIR ISLT GENOTYP: NORMAL
BILIRUB SERPL-MCNC: 0.7 MG/DL (ref 0.1–1)
BILIRUB SERPL-MCNC: 0.8 MG/DL (ref 0.1–1)
BILIRUB UR QL STRIP: NEGATIVE
BUN SERPL-MCNC: 22 MG/DL (ref 8–23)
BUN SERPL-MCNC: 29 MG/DL (ref 8–23)
CALCIUM SERPL-MCNC: 8.5 MG/DL (ref 8.7–10.5)
CALCIUM SERPL-MCNC: 8.8 MG/DL (ref 8.7–10.5)
CD3+CD4+ CELLS # BLD: 49 CELLS/UL (ref 300–1400)
CD3+CD4+ CELLS NFR BLD: 5 % (ref 28–57)
CHLORIDE SERPL-SCNC: 104 MMOL/L (ref 95–110)
CHLORIDE SERPL-SCNC: 105 MMOL/L (ref 95–110)
CLARITY UR REFRACT.AUTO: CLEAR
CO2 SERPL-SCNC: 18 MMOL/L (ref 23–29)
CO2 SERPL-SCNC: 19 MMOL/L (ref 23–29)
COLLECT DATE: NORMAL
COLOR UR AUTO: YELLOW
CREAT SERPL-MCNC: 1.6 MG/DL (ref 0.5–1.4)
CREAT SERPL-MCNC: 1.8 MG/DL (ref 0.5–1.4)
CTP QC/QA: YES
DACRYOCYTES BLD QL SMEAR: ABNORMAL
DIFFERENTIAL METHOD: ABNORMAL
DIFFERENTIAL METHOD: ABNORMAL
DOLUTEGRAVIR ISLT GENOTYP: NORMAL
ELVITEGRAVIR ISLT GENOTYP: NORMAL
EOSINOPHIL # BLD AUTO: 0.3 K/UL (ref 0–0.5)
EOSINOPHIL # BLD AUTO: 0.4 K/UL (ref 0–0.5)
EOSINOPHIL NFR BLD: 11.2 % (ref 0–8)
EOSINOPHIL NFR BLD: 5.8 % (ref 0–8)
ERYTHROCYTE [DISTWIDTH] IN BLOOD BY AUTOMATED COUNT: 13.4 % (ref 11.5–14.5)
ERYTHROCYTE [DISTWIDTH] IN BLOOD BY AUTOMATED COUNT: 14.4 % (ref 11.5–14.5)
EST. GFR  (AFRICAN AMERICAN): 45.9 ML/MIN/1.73 M^2
EST. GFR  (AFRICAN AMERICAN): 53 ML/MIN/1.73 M^2
EST. GFR  (NON AFRICAN AMERICAN): 39.7 ML/MIN/1.73 M^2
EST. GFR  (NON AFRICAN AMERICAN): 45.8 ML/MIN/1.73 M^2
GLUCOSE SERPL-MCNC: 122 MG/DL (ref 70–110)
GLUCOSE SERPL-MCNC: 90 MG/DL (ref 70–110)
GLUCOSE UR QL STRIP: NEGATIVE
HBV SURFACE AB SER-ACNC: POSITIVE M[IU]/ML
HCT VFR BLD AUTO: 36.8 % (ref 40–54)
HCT VFR BLD AUTO: 41.7 % (ref 40–54)
HCV AB SERPL QL IA: NEGATIVE
HGB BLD-MCNC: 12.1 G/DL (ref 14–18)
HGB BLD-MCNC: 12.9 G/DL (ref 14–18)
HGB UR QL STRIP: ABNORMAL
HYALINE CASTS UR QL AUTO: 0 /LPF
IMM GRANULOCYTES # BLD AUTO: 0.02 K/UL (ref 0–0.04)
IMM GRANULOCYTES # BLD AUTO: 0.16 K/UL (ref 0–0.04)
IMM GRANULOCYTES NFR BLD AUTO: 0.4 % (ref 0–0.5)
IMM GRANULOCYTES NFR BLD AUTO: 4.2 % (ref 0–0.5)
INR PPP: 0.9 (ref 0.8–1.2)
KETONES UR QL STRIP: NEGATIVE
LEUKOCYTE ESTERASE UR QL STRIP: NEGATIVE
LYMPHOCYTES # BLD AUTO: 0.9 K/UL (ref 1–4.8)
LYMPHOCYTES # BLD AUTO: 1.1 K/UL (ref 1–4.8)
LYMPHOCYTES NFR BLD: 20.7 % (ref 18–48)
LYMPHOCYTES NFR BLD: 27.7 % (ref 18–48)
MAGNESIUM SERPL-MCNC: 2.1 MG/DL (ref 1.6–2.6)
MCH RBC QN AUTO: 29.9 PG (ref 27–31)
MCH RBC QN AUTO: 29.9 PG (ref 27–31)
MCHC RBC AUTO-ENTMCNC: 30.9 G/DL (ref 32–36)
MCHC RBC AUTO-ENTMCNC: 32.9 G/DL (ref 32–36)
MCV RBC AUTO: 91 FL (ref 82–98)
MCV RBC AUTO: 97 FL (ref 82–98)
MICROSCOPIC COMMENT: NORMAL
MONOCYTES # BLD AUTO: 0.7 K/UL (ref 0.3–1)
MONOCYTES # BLD AUTO: 0.8 K/UL (ref 0.3–1)
MONOCYTES NFR BLD: 15.3 % (ref 4–15)
MONOCYTES NFR BLD: 20.1 % (ref 4–15)
NEUTROPHILS # BLD AUTO: 1.3 K/UL (ref 1.8–7.7)
NEUTROPHILS # BLD AUTO: 2.6 K/UL (ref 1.8–7.7)
NEUTROPHILS NFR BLD: 34.5 % (ref 38–73)
NEUTROPHILS NFR BLD: 57.6 % (ref 38–73)
NITRITE UR QL STRIP: NEGATIVE
NRBC BLD-RTO: 0 /100 WBC
NRBC BLD-RTO: 1 /100 WBC
OVALOCYTES BLD QL SMEAR: ABNORMAL
PH UR STRIP: 5 [PH] (ref 5–8)
PHOSPHATE SERPL-MCNC: 2.7 MG/DL (ref 2.7–4.5)
PLATELET # BLD AUTO: 116 K/UL (ref 150–450)
PLATELET # BLD AUTO: 133 K/UL (ref 150–450)
PLATELET BLD QL SMEAR: ABNORMAL
PMV BLD AUTO: 11.9 FL (ref 9.2–12.9)
PMV BLD AUTO: 12.3 FL (ref 9.2–12.9)
POIKILOCYTOSIS BLD QL SMEAR: SLIGHT
POTASSIUM SERPL-SCNC: 3.6 MMOL/L (ref 3.5–5.1)
POTASSIUM SERPL-SCNC: 4.5 MMOL/L (ref 3.5–5.1)
PROT SERPL-MCNC: 7.4 G/DL (ref 6–8.4)
PROT SERPL-MCNC: 7.4 G/DL (ref 6–8.4)
PROT UR QL STRIP: ABNORMAL
PROTHROMBIN TIME: 10.2 SEC (ref 9–12.5)
RALTEGRAVIR ISLT GENOTYP: NORMAL
RBC # BLD AUTO: 4.05 M/UL (ref 4.6–6.2)
RBC # BLD AUTO: 4.31 M/UL (ref 4.6–6.2)
RBC #/AREA URNS AUTO: 2 /HPF (ref 0–4)
SARS-COV-2 RDRP RESP QL NAA+PROBE: NEGATIVE
SODIUM SERPL-SCNC: 135 MMOL/L (ref 136–145)
SODIUM SERPL-SCNC: 136 MMOL/L (ref 136–145)
SP GR UR STRIP: 1.01 (ref 1–1.03)
SQUAMOUS #/AREA URNS AUTO: 1 /HPF
TARGETS BLD QL SMEAR: ABNORMAL
URN SPEC COLLECT METH UR: ABNORMAL
VALUE OF LAST VIRAL LOAD: NORMAL COPIES/ML
WBC # BLD AUTO: 3.83 K/UL (ref 3.9–12.7)
WBC # BLD AUTO: 4.45 K/UL (ref 3.9–12.7)
WBC #/AREA URNS AUTO: 2 /HPF (ref 0–5)

## 2021-01-01 PROCEDURE — 25000003 PHARM REV CODE 250

## 2021-01-01 PROCEDURE — S4991 NICOTINE PATCH NONLEGEND: HCPCS

## 2021-01-01 PROCEDURE — 97116 GAIT TRAINING THERAPY: CPT

## 2021-01-01 PROCEDURE — 25000003 PHARM REV CODE 250: Performed by: STUDENT IN AN ORGANIZED HEALTH CARE EDUCATION/TRAINING PROGRAM

## 2021-01-01 PROCEDURE — 93010 EKG 12-LEAD: ICD-10-PCS | Mod: ,,, | Performed by: INTERNAL MEDICINE

## 2021-01-01 PROCEDURE — 86706 HEP B SURFACE ANTIBODY: CPT

## 2021-01-01 PROCEDURE — 93010 ELECTROCARDIOGRAM REPORT: CPT | Mod: ,,, | Performed by: INTERNAL MEDICINE

## 2021-01-01 PROCEDURE — 96372 THER/PROPH/DIAG INJ SC/IM: CPT | Mod: 59

## 2021-01-01 PROCEDURE — 99225 PR SUBSEQUENT OBSERVATION CARE,LEVEL II: CPT | Mod: ,,, | Performed by: HOSPITALIST

## 2021-01-01 PROCEDURE — 63600175 PHARM REV CODE 636 W HCPCS: Performed by: STUDENT IN AN ORGANIZED HEALTH CARE EDUCATION/TRAINING PROGRAM

## 2021-01-01 PROCEDURE — 63600175 PHARM REV CODE 636 W HCPCS: Performed by: EMERGENCY MEDICINE

## 2021-01-01 PROCEDURE — 25000242 PHARM REV CODE 250 ALT 637 W/ HCPCS: Performed by: STUDENT IN AN ORGANIZED HEALTH CARE EDUCATION/TRAINING PROGRAM

## 2021-01-01 PROCEDURE — 85610 PROTHROMBIN TIME: CPT | Performed by: EMERGENCY MEDICINE

## 2021-01-01 PROCEDURE — 81001 URINALYSIS AUTO W/SCOPE: CPT

## 2021-01-01 PROCEDURE — 86803 HEPATITIS C AB TEST: CPT | Performed by: EMERGENCY MEDICINE

## 2021-01-01 PROCEDURE — 99225 PR SUBSEQUENT OBSERVATION CARE,LEVEL II: ICD-10-PCS | Mod: ,,, | Performed by: HOSPITALIST

## 2021-01-01 PROCEDURE — 97530 THERAPEUTIC ACTIVITIES: CPT

## 2021-01-01 PROCEDURE — 99291 CRITICAL CARE FIRST HOUR: CPT | Mod: 25

## 2021-01-01 PROCEDURE — 84100 ASSAY OF PHOSPHORUS: CPT | Performed by: STUDENT IN AN ORGANIZED HEALTH CARE EDUCATION/TRAINING PROGRAM

## 2021-01-01 PROCEDURE — 97161 PT EVAL LOW COMPLEX 20 MIN: CPT

## 2021-01-01 PROCEDURE — 85730 THROMBOPLASTIN TIME PARTIAL: CPT | Performed by: EMERGENCY MEDICINE

## 2021-01-01 PROCEDURE — 96375 TX/PRO/DX INJ NEW DRUG ADDON: CPT

## 2021-01-01 PROCEDURE — G0378 HOSPITAL OBSERVATION PER HR: HCPCS

## 2021-01-01 PROCEDURE — 82306 VITAMIN D 25 HYDROXY: CPT | Performed by: STUDENT IN AN ORGANIZED HEALTH CARE EDUCATION/TRAINING PROGRAM

## 2021-01-01 PROCEDURE — 25000003 PHARM REV CODE 250: Performed by: EMERGENCY MEDICINE

## 2021-01-01 PROCEDURE — U0002 COVID-19 LAB TEST NON-CDC: HCPCS | Performed by: EMERGENCY MEDICINE

## 2021-01-01 PROCEDURE — 86361 T CELL ABSOLUTE COUNT: CPT | Performed by: EMERGENCY MEDICINE

## 2021-01-01 PROCEDURE — 97535 SELF CARE MNGMENT TRAINING: CPT | Mod: CO

## 2021-01-01 PROCEDURE — 80053 COMPREHEN METABOLIC PANEL: CPT | Performed by: EMERGENCY MEDICINE

## 2021-01-01 PROCEDURE — 96374 THER/PROPH/DIAG INJ IV PUSH: CPT

## 2021-01-01 PROCEDURE — 83735 ASSAY OF MAGNESIUM: CPT | Performed by: STUDENT IN AN ORGANIZED HEALTH CARE EDUCATION/TRAINING PROGRAM

## 2021-01-01 PROCEDURE — 93005 ELECTROCARDIOGRAM TRACING: CPT

## 2021-01-01 PROCEDURE — 99291 CRITICAL CARE FIRST HOUR: CPT | Mod: ,,, | Performed by: EMERGENCY MEDICINE

## 2021-01-01 PROCEDURE — 99220 PR INITIAL OBSERVATION CARE,LEVL III: ICD-10-PCS | Mod: ,,, | Performed by: HOSPITALIST

## 2021-01-01 PROCEDURE — 80053 COMPREHEN METABOLIC PANEL: CPT | Performed by: STUDENT IN AN ORGANIZED HEALTH CARE EDUCATION/TRAINING PROGRAM

## 2021-01-01 PROCEDURE — 97110 THERAPEUTIC EXERCISES: CPT | Mod: CO

## 2021-01-01 PROCEDURE — 99220 PR INITIAL OBSERVATION CARE,LEVL III: CPT | Mod: ,,, | Performed by: HOSPITALIST

## 2021-01-01 PROCEDURE — 36415 COLL VENOUS BLD VENIPUNCTURE: CPT

## 2021-01-01 PROCEDURE — 85025 COMPLETE CBC W/AUTO DIFF WBC: CPT | Performed by: STUDENT IN AN ORGANIZED HEALTH CARE EDUCATION/TRAINING PROGRAM

## 2021-01-01 PROCEDURE — 36415 COLL VENOUS BLD VENIPUNCTURE: CPT | Performed by: STUDENT IN AN ORGANIZED HEALTH CARE EDUCATION/TRAINING PROGRAM

## 2021-01-01 PROCEDURE — 97165 OT EVAL LOW COMPLEX 30 MIN: CPT

## 2021-01-01 PROCEDURE — 85025 COMPLETE CBC W/AUTO DIFF WBC: CPT | Performed by: EMERGENCY MEDICINE

## 2021-01-01 PROCEDURE — 99291 PR CRITICAL CARE, E/M 30-74 MINUTES: ICD-10-PCS | Mod: ,,, | Performed by: EMERGENCY MEDICINE

## 2021-01-01 PROCEDURE — 97535 SELF CARE MNGMENT TRAINING: CPT

## 2021-01-01 RX ORDER — HYDROMORPHONE HYDROCHLORIDE 1 MG/ML
1 INJECTION, SOLUTION INTRAMUSCULAR; INTRAVENOUS; SUBCUTANEOUS
Status: COMPLETED | OUTPATIENT
Start: 2021-01-01 | End: 2021-01-01

## 2021-01-01 RX ORDER — HYDROCHLOROTHIAZIDE 50 MG/1
50 TABLET ORAL DAILY
COMMUNITY

## 2021-01-01 RX ORDER — HYDROCODONE BITARTRATE AND ACETAMINOPHEN 7.5; 325 MG/1; MG/1
1-1.5 TABLET ORAL EVERY 8 HOURS PRN
COMMUNITY

## 2021-01-01 RX ORDER — HYDRALAZINE HYDROCHLORIDE 20 MG/ML
10 INJECTION INTRAMUSCULAR; INTRAVENOUS
Status: COMPLETED | OUTPATIENT
Start: 2021-01-01 | End: 2021-01-01

## 2021-01-01 RX ORDER — IBUPROFEN 200 MG
16 TABLET ORAL
Status: DISCONTINUED | OUTPATIENT
Start: 2021-01-01 | End: 2021-01-01 | Stop reason: HOSPADM

## 2021-01-01 RX ORDER — FOLIC ACID 1 MG/1
1 TABLET ORAL DAILY
Status: DISCONTINUED | OUTPATIENT
Start: 2021-01-01 | End: 2021-01-01 | Stop reason: HOSPADM

## 2021-01-01 RX ORDER — METOPROLOL SUCCINATE 50 MG/1
50 TABLET, EXTENDED RELEASE ORAL DAILY
Qty: 90 TABLET | Refills: 3 | Status: SHIPPED | OUTPATIENT
Start: 2021-01-01

## 2021-01-01 RX ORDER — METOPROLOL SUCCINATE 50 MG/1
50 TABLET, EXTENDED RELEASE ORAL
Status: COMPLETED | OUTPATIENT
Start: 2021-01-01 | End: 2021-01-01

## 2021-01-01 RX ORDER — ATOVAQUONE 750 MG/5ML
1500 SUSPENSION ORAL DAILY
Status: DISCONTINUED | OUTPATIENT
Start: 2021-01-01 | End: 2021-01-01 | Stop reason: HOSPADM

## 2021-01-01 RX ORDER — OXYCODONE AND ACETAMINOPHEN 5; 325 MG/1; MG/1
1 TABLET ORAL EVERY 4 HOURS PRN
Status: DISCONTINUED | OUTPATIENT
Start: 2021-01-01 | End: 2021-01-01 | Stop reason: HOSPADM

## 2021-01-01 RX ORDER — ACETAMINOPHEN 500 MG
1000 TABLET ORAL EVERY 8 HOURS PRN
Status: DISCONTINUED | OUTPATIENT
Start: 2021-01-01 | End: 2021-01-01 | Stop reason: HOSPADM

## 2021-01-01 RX ORDER — CLOPIDOGREL BISULFATE 75 MG/1
75 TABLET ORAL DAILY
Status: DISCONTINUED | OUTPATIENT
Start: 2021-01-01 | End: 2021-01-01 | Stop reason: HOSPADM

## 2021-01-01 RX ORDER — ERGOCALCIFEROL 1.25 MG/1
50000 CAPSULE ORAL
Status: DISCONTINUED | OUTPATIENT
Start: 2021-01-01 | End: 2021-01-01 | Stop reason: HOSPADM

## 2021-01-01 RX ORDER — NAPROXEN SODIUM 220 MG/1
81 TABLET, FILM COATED ORAL DAILY
Status: DISCONTINUED | OUTPATIENT
Start: 2021-01-01 | End: 2021-01-01 | Stop reason: HOSPADM

## 2021-01-01 RX ORDER — HEPARIN SODIUM 5000 [USP'U]/ML
5000 INJECTION, SOLUTION INTRAVENOUS; SUBCUTANEOUS EVERY 8 HOURS
Status: DISCONTINUED | OUTPATIENT
Start: 2021-01-01 | End: 2021-01-01 | Stop reason: HOSPADM

## 2021-01-01 RX ORDER — NAPROXEN SODIUM 220 MG/1
81 TABLET, FILM COATED ORAL DAILY
COMMUNITY

## 2021-01-01 RX ORDER — ERGOCALCIFEROL 1.25 MG/1
50000 CAPSULE ORAL
Qty: 4 CAPSULE | Refills: 3 | Status: SHIPPED | OUTPATIENT
Start: 2021-01-01

## 2021-01-01 RX ORDER — TRIAMCINOLONE ACETONIDE 1 MG/G
CREAM TOPICAL 2 TIMES DAILY
COMMUNITY

## 2021-01-01 RX ORDER — LOSARTAN POTASSIUM 50 MG/1
50 TABLET ORAL DAILY
Qty: 30 TABLET | Refills: 11 | Status: SHIPPED | OUTPATIENT
Start: 2021-01-01

## 2021-01-01 RX ORDER — MORPHINE SULFATE 2 MG/ML
1 INJECTION, SOLUTION INTRAMUSCULAR; INTRAVENOUS EVERY 4 HOURS PRN
Status: DISCONTINUED | OUTPATIENT
Start: 2021-01-01 | End: 2021-01-01

## 2021-01-01 RX ORDER — NICOTINE 7MG/24HR
1 PATCH, TRANSDERMAL 24 HOURS TRANSDERMAL DAILY
Status: DISCONTINUED | OUTPATIENT
Start: 2021-01-01 | End: 2021-01-01

## 2021-01-01 RX ORDER — ACETAMINOPHEN 325 MG/1
650 TABLET ORAL EVERY 6 HOURS PRN
Status: DISCONTINUED | OUTPATIENT
Start: 2021-01-01 | End: 2021-01-01

## 2021-01-01 RX ORDER — CLOBETASOL PROPIONATE 0.5 MG/G
CREAM TOPICAL 2 TIMES DAILY
COMMUNITY

## 2021-01-01 RX ORDER — CALCIUM CARBONATE 200(500)MG
1000 TABLET,CHEWABLE ORAL DAILY
Status: DISCONTINUED | OUTPATIENT
Start: 2021-01-01 | End: 2021-01-01 | Stop reason: HOSPADM

## 2021-01-01 RX ORDER — HYDROCHLOROTHIAZIDE 25 MG/1
25 TABLET ORAL
Status: COMPLETED | OUTPATIENT
Start: 2021-01-01 | End: 2021-01-01

## 2021-01-01 RX ORDER — ALBUTEROL SULFATE 90 UG/1
2 AEROSOL, METERED RESPIRATORY (INHALATION) EVERY 6 HOURS PRN
Status: DISCONTINUED | OUTPATIENT
Start: 2021-01-01 | End: 2021-01-01 | Stop reason: HOSPADM

## 2021-01-01 RX ORDER — FLUTICASONE PROPIONATE 50 MCG
2 SPRAY, SUSPENSION (ML) NASAL DAILY
Qty: 16 G | Refills: 0 | Status: SHIPPED | OUTPATIENT
Start: 2021-01-01 | End: 2021-01-01

## 2021-01-01 RX ORDER — TALC
3 POWDER (GRAM) TOPICAL NIGHTLY PRN
Refills: 0 | COMMUNITY
Start: 2021-01-01

## 2021-01-01 RX ORDER — CHOLECALCIFEROL (VITAMIN D3) 25 MCG
1000 TABLET ORAL DAILY
Status: DISCONTINUED | OUTPATIENT
Start: 2021-01-01 | End: 2021-01-01

## 2021-01-01 RX ORDER — FAMOTIDINE 20 MG/1
20 TABLET, FILM COATED ORAL NIGHTLY
Status: DISCONTINUED | OUTPATIENT
Start: 2021-01-01 | End: 2021-01-01 | Stop reason: HOSPADM

## 2021-01-01 RX ORDER — IBUPROFEN 200 MG
1 TABLET ORAL DAILY
Status: DISCONTINUED | OUTPATIENT
Start: 2021-01-01 | End: 2021-01-01 | Stop reason: HOSPADM

## 2021-01-01 RX ORDER — HYDROCHLOROTHIAZIDE 25 MG/1
50 TABLET ORAL DAILY
Status: DISCONTINUED | OUTPATIENT
Start: 2021-01-01 | End: 2021-01-01 | Stop reason: HOSPADM

## 2021-01-01 RX ORDER — METOPROLOL SUCCINATE 50 MG/1
50 TABLET, EXTENDED RELEASE ORAL DAILY
Qty: 90 TABLET | Refills: 3 | Status: SHIPPED | OUTPATIENT
Start: 2021-01-01 | End: 2021-01-01 | Stop reason: SDUPTHER

## 2021-01-01 RX ORDER — HYDRALAZINE HYDROCHLORIDE 10 MG/1
10 TABLET, FILM COATED ORAL 3 TIMES DAILY
Status: DISCONTINUED | OUTPATIENT
Start: 2021-01-01 | End: 2021-01-01 | Stop reason: HOSPADM

## 2021-01-01 RX ORDER — HYDRALAZINE HYDROCHLORIDE 10 MG/1
10 TABLET, FILM COATED ORAL DAILY
Status: ON HOLD | COMMUNITY
End: 2021-01-01 | Stop reason: HOSPADM

## 2021-01-01 RX ORDER — NIFEDIPINE 30 MG/1
90 TABLET, EXTENDED RELEASE ORAL
Status: COMPLETED | OUTPATIENT
Start: 2021-01-01 | End: 2021-01-01

## 2021-01-01 RX ORDER — LOSARTAN POTASSIUM 50 MG/1
50 TABLET ORAL DAILY
Start: 2021-01-01 | End: 2021-01-01 | Stop reason: SDUPTHER

## 2021-01-01 RX ORDER — LIDOCAINE 50 MG/G
1 PATCH TOPICAL DAILY PRN
COMMUNITY

## 2021-01-01 RX ORDER — MORPHINE SULFATE 2 MG/ML
2 INJECTION, SOLUTION INTRAMUSCULAR; INTRAVENOUS EVERY 4 HOURS PRN
Status: DISCONTINUED | OUTPATIENT
Start: 2021-01-01 | End: 2021-01-01 | Stop reason: HOSPADM

## 2021-01-01 RX ORDER — SODIUM CHLORIDE 0.9 % (FLUSH) 0.9 %
10 SYRINGE (ML) INJECTION EVERY 12 HOURS PRN
Status: DISCONTINUED | OUTPATIENT
Start: 2021-01-01 | End: 2021-01-01 | Stop reason: HOSPADM

## 2021-01-01 RX ORDER — ATOVAQUONE 750 MG/5ML
1500 SUSPENSION ORAL DAILY
Qty: 300 ML | Refills: 3 | Status: SHIPPED | OUTPATIENT
Start: 2021-01-01

## 2021-01-01 RX ORDER — METOPROLOL SUCCINATE 100 MG/1
100 TABLET, EXTENDED RELEASE ORAL DAILY
Status: ON HOLD | COMMUNITY
End: 2021-01-01 | Stop reason: SDUPTHER

## 2021-01-01 RX ORDER — DULOXETIN HYDROCHLORIDE 30 MG/1
60 CAPSULE, DELAYED RELEASE ORAL DAILY
COMMUNITY

## 2021-01-01 RX ORDER — ATOVAQUONE 750 MG/5ML
1500 SUSPENSION ORAL DAILY
Qty: 300 ML | Refills: 3 | Status: SHIPPED | OUTPATIENT
Start: 2021-01-01 | End: 2021-01-01

## 2021-01-01 RX ORDER — GABAPENTIN 300 MG/1
300 CAPSULE ORAL DAILY
COMMUNITY

## 2021-01-01 RX ORDER — ATORVASTATIN CALCIUM 20 MG/1
40 TABLET, FILM COATED ORAL NIGHTLY
Status: DISCONTINUED | OUTPATIENT
Start: 2021-01-01 | End: 2021-01-01 | Stop reason: HOSPADM

## 2021-01-01 RX ORDER — OXYMETAZOLINE HCL 0.05 %
2 SPRAY, NON-AEROSOL (ML) NASAL 2 TIMES DAILY
COMMUNITY

## 2021-01-01 RX ORDER — FLUTICASONE PROPIONATE 50 MCG
2 SPRAY, SUSPENSION (ML) NASAL DAILY
Status: DISCONTINUED | OUTPATIENT
Start: 2021-01-01 | End: 2021-01-01 | Stop reason: HOSPADM

## 2021-01-01 RX ORDER — LOSARTAN POTASSIUM 50 MG/1
50 TABLET ORAL DAILY
Status: ON HOLD | COMMUNITY
End: 2021-01-01 | Stop reason: SDUPTHER

## 2021-01-01 RX ORDER — LANOLIN ALCOHOL/MO/W.PET/CERES
1 CREAM (GRAM) TOPICAL DAILY
Status: DISCONTINUED | OUTPATIENT
Start: 2021-01-01 | End: 2021-01-01 | Stop reason: HOSPADM

## 2021-01-01 RX ORDER — LIDOCAINE 50 MG/G
1 PATCH TOPICAL DAILY
Status: DISCONTINUED | OUTPATIENT
Start: 2021-01-01 | End: 2021-01-01 | Stop reason: HOSPADM

## 2021-01-01 RX ORDER — BICTEGRAVIR SODIUM, EMTRICITABINE, AND TENOFOVIR ALAFENAMIDE FUMARATE 50; 200; 25 MG/1; MG/1; MG/1
1 TABLET ORAL DAILY
COMMUNITY

## 2021-01-01 RX ORDER — ASPIRIN 81 MG/1
81 TABLET ORAL DAILY
Status: DISCONTINUED | OUTPATIENT
Start: 2021-01-01 | End: 2021-01-01

## 2021-01-01 RX ORDER — GLUCAGON 1 MG
1 KIT INJECTION
Status: DISCONTINUED | OUTPATIENT
Start: 2021-01-01 | End: 2021-01-01 | Stop reason: HOSPADM

## 2021-01-01 RX ORDER — IBUPROFEN 200 MG
24 TABLET ORAL
Status: DISCONTINUED | OUTPATIENT
Start: 2021-01-01 | End: 2021-01-01 | Stop reason: HOSPADM

## 2021-01-01 RX ORDER — TALC
3 POWDER (GRAM) TOPICAL NIGHTLY PRN
Status: DISCONTINUED | OUTPATIENT
Start: 2021-01-01 | End: 2021-01-01 | Stop reason: HOSPADM

## 2021-01-01 RX ORDER — ERGOCALCIFEROL 1.25 MG/1
50000 CAPSULE ORAL
Qty: 4 CAPSULE | Refills: 3 | Status: SHIPPED | OUTPATIENT
Start: 2021-01-01 | End: 2021-01-01

## 2021-01-01 RX ORDER — METOPROLOL SUCCINATE 50 MG/1
50 TABLET, EXTENDED RELEASE ORAL DAILY
Status: DISCONTINUED | OUTPATIENT
Start: 2021-01-01 | End: 2021-01-01 | Stop reason: HOSPADM

## 2021-01-01 RX ORDER — DICLOFENAC SODIUM 10 MG/G
2 GEL TOPICAL DAILY
COMMUNITY

## 2021-01-01 RX ORDER — NALOXONE HCL 0.4 MG/ML
0.02 VIAL (ML) INJECTION
Status: DISCONTINUED | OUTPATIENT
Start: 2021-01-01 | End: 2021-01-01 | Stop reason: HOSPADM

## 2021-01-01 RX ADMIN — FOLIC ACID 1 MG: 1 TABLET ORAL at 09:12

## 2021-01-01 RX ADMIN — FERROUS SULFATE TAB 325 MG (65 MG ELEMENTAL FE) 1 EACH: 325 (65 FE) TAB at 09:12

## 2021-01-01 RX ADMIN — FERROUS SULFATE TAB 325 MG (65 MG ELEMENTAL FE) 1 EACH: 325 (65 FE) TAB at 08:12

## 2021-01-01 RX ADMIN — CALCIUM CARBONATE (ANTACID) CHEW TAB 500 MG 1000 MG: 500 CHEW TAB at 09:12

## 2021-01-01 RX ADMIN — HYDROMORPHONE HYDROCHLORIDE 1 MG: 1 INJECTION, SOLUTION INTRAMUSCULAR; INTRAVENOUS; SUBCUTANEOUS at 08:12

## 2021-01-01 RX ADMIN — CLOPIDOGREL 75 MG: 75 TABLET, FILM COATED ORAL at 09:12

## 2021-01-01 RX ADMIN — METOPROLOL SUCCINATE 50 MG: 50 TABLET, EXTENDED RELEASE ORAL at 08:12

## 2021-01-01 RX ADMIN — METOPROLOL SUCCINATE 50 MG: 50 TABLET, EXTENDED RELEASE ORAL at 09:12

## 2021-01-01 RX ADMIN — NIFEDIPINE 90 MG: 30 TABLET, FILM COATED, EXTENDED RELEASE ORAL at 09:12

## 2021-01-01 RX ADMIN — HYDROCHLOROTHIAZIDE 50 MG: 25 TABLET ORAL at 08:12

## 2021-01-01 RX ADMIN — HYDRALAZINE HYDROCHLORIDE 10 MG: 10 TABLET, FILM COATED ORAL at 08:12

## 2021-01-01 RX ADMIN — ASPIRIN 81 MG CHEWABLE TABLET 81 MG: 81 TABLET CHEWABLE at 02:12

## 2021-01-01 RX ADMIN — BICTEGRAVIR SODIUM, EMTRICITABINE, AND TENOFOVIR ALAFENAMIDE FUMARATE 1 TABLET: 50; 200; 25 TABLET ORAL at 12:12

## 2021-01-01 RX ADMIN — FOLIC ACID 1 MG: 1 TABLET ORAL at 08:12

## 2021-01-01 RX ADMIN — FLUTICASONE PROPIONATE 100 MCG: 50 SPRAY, METERED NASAL at 11:12

## 2021-01-01 RX ADMIN — NIFEDIPINE 90 MG: 60 TABLET, FILM COATED, EXTENDED RELEASE ORAL at 08:12

## 2021-01-01 RX ADMIN — CALCIUM CARBONATE (ANTACID) CHEW TAB 500 MG 1000 MG: 500 CHEW TAB at 08:12

## 2021-01-01 RX ADMIN — FAMOTIDINE 20 MG: 20 TABLET ORAL at 09:12

## 2021-01-01 RX ADMIN — FLUTICASONE PROPIONATE 100 MCG: 50 SPRAY, METERED NASAL at 09:12

## 2021-01-01 RX ADMIN — CLOPIDOGREL 75 MG: 75 TABLET, FILM COATED ORAL at 08:12

## 2021-01-01 RX ADMIN — ATORVASTATIN CALCIUM 40 MG: 20 TABLET, FILM COATED ORAL at 09:12

## 2021-01-01 RX ADMIN — HEPARIN SODIUM 5000 UNITS: 5000 INJECTION INTRAVENOUS; SUBCUTANEOUS at 10:12

## 2021-01-01 RX ADMIN — HEPARIN SODIUM 5000 UNITS: 5000 INJECTION INTRAVENOUS; SUBCUTANEOUS at 02:12

## 2021-01-01 RX ADMIN — HEPARIN SODIUM 5000 UNITS: 5000 INJECTION INTRAVENOUS; SUBCUTANEOUS at 06:12

## 2021-01-01 RX ADMIN — NIFEDIPINE 90 MG: 60 TABLET, FILM COATED, EXTENDED RELEASE ORAL at 09:12

## 2021-01-01 RX ADMIN — OXYCODONE HYDROCHLORIDE AND ACETAMINOPHEN 1 TABLET: 5; 325 TABLET ORAL at 08:12

## 2021-01-01 RX ADMIN — NICOTINE 1 PATCH: 14 PATCH, EXTENDED RELEASE TRANSDERMAL at 11:12

## 2021-01-01 RX ADMIN — LIDOCAINE 5% 1 PATCH: 700 PATCH TOPICAL at 08:12

## 2021-01-01 RX ADMIN — HYDROMORPHONE HYDROCHLORIDE 1 MG: 1 INJECTION, SOLUTION INTRAMUSCULAR; INTRAVENOUS; SUBCUTANEOUS at 12:12

## 2021-01-01 RX ADMIN — HEPARIN SODIUM 5000 UNITS: 5000 INJECTION INTRAVENOUS; SUBCUTANEOUS at 09:12

## 2021-01-01 RX ADMIN — Medication 1000 UNITS: at 08:12

## 2021-01-01 RX ADMIN — HEPARIN SODIUM 5000 UNITS: 5000 INJECTION INTRAVENOUS; SUBCUTANEOUS at 05:12

## 2021-01-01 RX ADMIN — BICTEGRAVIR SODIUM, EMTRICITABINE, AND TENOFOVIR ALAFENAMIDE FUMARATE 1 TABLET: 50; 200; 25 TABLET ORAL at 08:12

## 2021-01-01 RX ADMIN — HEPARIN SODIUM 5000 UNITS: 5000 INJECTION INTRAVENOUS; SUBCUTANEOUS at 07:12

## 2021-01-01 RX ADMIN — ATOVAQUONE 1500 MG: 750 SUSPENSION ORAL at 08:12

## 2021-01-01 RX ADMIN — HYDRALAZINE HYDROCHLORIDE 10 MG: 10 TABLET, FILM COATED ORAL at 09:12

## 2021-01-01 RX ADMIN — ASPIRIN 81 MG CHEWABLE TABLET 81 MG: 81 TABLET CHEWABLE at 08:12

## 2021-01-01 RX ADMIN — HYDRALAZINE HYDROCHLORIDE 10 MG: 10 TABLET, FILM COATED ORAL at 02:12

## 2021-01-01 RX ADMIN — LIDOCAINE 5% 1 PATCH: 700 PATCH TOPICAL at 11:12

## 2021-01-01 RX ADMIN — HYDROCHLOROTHIAZIDE 25 MG: 25 TABLET ORAL at 09:12

## 2021-01-01 RX ADMIN — NICOTINE 1 PATCH: 14 PATCH, EXTENDED RELEASE TRANSDERMAL at 09:12

## 2021-01-01 RX ADMIN — HYDROCHLOROTHIAZIDE 50 MG: 25 TABLET ORAL at 02:12

## 2021-01-01 RX ADMIN — HYDRALAZINE HYDROCHLORIDE 10 MG: 20 INJECTION INTRAMUSCULAR; INTRAVENOUS at 12:12

## 2021-02-26 ENCOUNTER — LAB VISIT (OUTPATIENT)
Dept: LAB | Facility: HOSPITAL | Age: 61
End: 2021-02-26
Attending: INTERNAL MEDICINE
Payer: MEDICAID

## 2021-02-26 DIAGNOSIS — L40.9 PSORIASIS: ICD-10-CM

## 2021-02-26 LAB
AMPHET+METHAMPHET UR QL: NEGATIVE
BARBITURATES UR QL SCN>200 NG/ML: NEGATIVE
BENZODIAZ UR QL SCN>200 NG/ML: NEGATIVE
BZE UR QL SCN: NEGATIVE
CANNABINOIDS UR QL SCN: NEGATIVE
CREAT UR-MCNC: 159 MG/DL (ref 23–375)
ETHANOL UR-MCNC: <10 MG/DL
METHADONE UR QL SCN>300 NG/ML: NEGATIVE
OPIATES UR QL SCN: NEGATIVE
PCP UR QL SCN>25 NG/ML: NEGATIVE
TOXICOLOGY INFORMATION: NORMAL

## 2021-02-26 PROCEDURE — 80307 DRUG TEST PRSMV CHEM ANLYZR: CPT

## 2021-04-07 ENCOUNTER — LAB VISIT (OUTPATIENT)
Dept: LAB | Facility: HOSPITAL | Age: 61
End: 2021-04-07
Attending: INTERNAL MEDICINE
Payer: MEDICAID

## 2021-04-07 DIAGNOSIS — L40.9 PSORIASIS: ICD-10-CM

## 2021-04-07 PROCEDURE — 80307 DRUG TEST PRSMV CHEM ANLYZR: CPT | Performed by: INTERNAL MEDICINE

## 2021-04-08 LAB
AMPHET+METHAMPHET UR QL: NEGATIVE
BARBITURATES UR QL SCN>200 NG/ML: NEGATIVE
BENZODIAZ UR QL SCN>200 NG/ML: NEGATIVE
BZE UR QL SCN: NEGATIVE
CANNABINOIDS UR QL SCN: NEGATIVE
CREAT UR-MCNC: 138 MG/DL (ref 23–375)
ETHANOL UR-MCNC: <10 MG/DL
METHADONE UR QL SCN>300 NG/ML: NEGATIVE
OPIATES UR QL SCN: NEGATIVE
PCP UR QL SCN>25 NG/ML: NEGATIVE
TOXICOLOGY INFORMATION: NORMAL

## 2021-04-15 ENCOUNTER — PATIENT MESSAGE (OUTPATIENT)
Dept: RESEARCH | Facility: HOSPITAL | Age: 61
End: 2021-04-15

## 2021-04-19 ENCOUNTER — LAB VISIT (OUTPATIENT)
Dept: LAB | Facility: HOSPITAL | Age: 61
End: 2021-04-19
Attending: INTERNAL MEDICINE
Payer: MEDICAID

## 2021-04-19 DIAGNOSIS — L40.9 PSORIASIS: ICD-10-CM

## 2021-04-19 DIAGNOSIS — B20 AIDS (ACQUIRED IMMUNE DEFICIENCY SYNDROME): ICD-10-CM

## 2021-04-19 LAB
ALBUMIN SERPL BCP-MCNC: 2.9 G/DL (ref 3.5–5.2)
ALP SERPL-CCNC: 86 U/L (ref 55–135)
ALT SERPL W/O P-5'-P-CCNC: 14 U/L (ref 10–44)
ANION GAP SERPL CALC-SCNC: 13 MMOL/L (ref 8–16)
AST SERPL-CCNC: 23 U/L (ref 10–40)
BASOPHILS # BLD AUTO: 0.01 K/UL (ref 0–0.2)
BASOPHILS NFR BLD: 0.3 % (ref 0–1.9)
BILIRUB SERPL-MCNC: 1.1 MG/DL (ref 0.1–1)
BUN SERPL-MCNC: 31 MG/DL (ref 6–20)
CALCIUM SERPL-MCNC: 8.7 MG/DL (ref 8.7–10.5)
CHLORIDE SERPL-SCNC: 95 MMOL/L (ref 95–110)
CO2 SERPL-SCNC: 23 MMOL/L (ref 23–29)
CREAT SERPL-MCNC: 2.1 MG/DL (ref 0.5–1.4)
DIFFERENTIAL METHOD: ABNORMAL
EOSINOPHIL # BLD AUTO: 0.2 K/UL (ref 0–0.5)
EOSINOPHIL NFR BLD: 6.6 % (ref 0–8)
ERYTHROCYTE [DISTWIDTH] IN BLOOD BY AUTOMATED COUNT: 13.9 % (ref 11.5–14.5)
EST. GFR  (AFRICAN AMERICAN): 38.4 ML/MIN/1.73 M^2
EST. GFR  (NON AFRICAN AMERICAN): 33.2 ML/MIN/1.73 M^2
GLUCOSE SERPL-MCNC: 102 MG/DL (ref 70–110)
HCT VFR BLD AUTO: 32.1 % (ref 40–54)
HGB BLD-MCNC: 10.7 G/DL (ref 14–18)
IMM GRANULOCYTES # BLD AUTO: 0.04 K/UL (ref 0–0.04)
IMM GRANULOCYTES NFR BLD AUTO: 1.3 % (ref 0–0.5)
LYMPHOCYTES # BLD AUTO: 0.6 K/UL (ref 1–4.8)
LYMPHOCYTES NFR BLD: 19.6 % (ref 18–48)
MCH RBC QN AUTO: 28.3 PG (ref 27–31)
MCHC RBC AUTO-ENTMCNC: 33.3 G/DL (ref 32–36)
MCV RBC AUTO: 85 FL (ref 82–98)
MONOCYTES # BLD AUTO: 0.5 K/UL (ref 0.3–1)
MONOCYTES NFR BLD: 15.6 % (ref 4–15)
NEUTROPHILS # BLD AUTO: 1.7 K/UL (ref 1.8–7.7)
NEUTROPHILS NFR BLD: 56.6 % (ref 38–73)
NRBC BLD-RTO: 0 /100 WBC
PLATELET # BLD AUTO: 125 K/UL (ref 150–450)
PMV BLD AUTO: 11.4 FL (ref 9.2–12.9)
POTASSIUM SERPL-SCNC: 3.5 MMOL/L (ref 3.5–5.1)
PROT SERPL-MCNC: 8.7 G/DL (ref 6–8.4)
RBC # BLD AUTO: 3.78 M/UL (ref 4.6–6.2)
SODIUM SERPL-SCNC: 131 MMOL/L (ref 136–145)
WBC # BLD AUTO: 3.01 K/UL (ref 3.9–12.7)

## 2021-04-19 PROCEDURE — 85025 COMPLETE CBC W/AUTO DIFF WBC: CPT | Performed by: INTERNAL MEDICINE

## 2021-04-19 PROCEDURE — 86592 SYPHILIS TEST NON-TREP QUAL: CPT | Performed by: INTERNAL MEDICINE

## 2021-04-19 PROCEDURE — 87536 HIV-1 QUANT&REVRSE TRNSCRPJ: CPT | Performed by: INTERNAL MEDICINE

## 2021-04-19 PROCEDURE — 86361 T CELL ABSOLUTE COUNT: CPT | Performed by: INTERNAL MEDICINE

## 2021-04-19 PROCEDURE — 80053 COMPREHEN METABOLIC PANEL: CPT | Performed by: INTERNAL MEDICINE

## 2021-04-20 LAB
CD3+CD4+ CELLS # BLD: 31 CELLS/UL (ref 300–1400)
CD3+CD4+ CELLS NFR BLD: 6.3 % (ref 28–57)
RPR SER QL: NORMAL

## 2021-04-21 LAB — HIV1 RNA # PLAS NAA DL=20: 2360 COPIES/ML

## 2021-04-22 ENCOUNTER — HOSPITAL ENCOUNTER (INPATIENT)
Facility: HOSPITAL | Age: 61
LOS: 5 days | Discharge: NURSING, REHAB OR LONG-TERM CARE | DRG: 064 | End: 2021-04-27
Attending: EMERGENCY MEDICINE | Admitting: FAMILY MEDICINE
Payer: MEDICAID

## 2021-04-22 DIAGNOSIS — I63.511 ACUTE ISCHEMIC RIGHT MCA STROKE: Primary | ICD-10-CM

## 2021-04-22 DIAGNOSIS — I63.9 STROKE: ICD-10-CM

## 2021-04-22 DIAGNOSIS — I16.1 HYPERTENSIVE EMERGENCY: ICD-10-CM

## 2021-04-22 DIAGNOSIS — R53.1 LEFT-SIDED WEAKNESS: ICD-10-CM

## 2021-04-22 DIAGNOSIS — R00.0 TACHYCARDIA: ICD-10-CM

## 2021-04-22 DIAGNOSIS — I63.9 CVA (CEREBRAL VASCULAR ACCIDENT): ICD-10-CM

## 2021-04-22 PROBLEM — J18.9 PNEUMONIA: Status: ACTIVE | Noted: 2021-04-22

## 2021-04-22 PROBLEM — I63.411 STROKE DUE TO EMBOLISM OF RIGHT MIDDLE CEREBRAL ARTERY: Status: ACTIVE | Noted: 2021-04-22

## 2021-04-22 LAB
ALBUMIN SERPL BCP-MCNC: 2.9 G/DL (ref 3.5–5.2)
ALP SERPL-CCNC: 82 U/L (ref 55–135)
ALT SERPL W/O P-5'-P-CCNC: 15 U/L (ref 10–44)
AMPHET+METHAMPHET UR QL: NEGATIVE
ANION GAP SERPL CALC-SCNC: 10 MMOL/L (ref 8–16)
AORTIC ROOT ANNULUS: 3.19 CM
ASCENDING AORTA: 2.88 CM
AST SERPL-CCNC: 20 U/L (ref 10–40)
AV INDEX (PROSTH): 0.85
AV MEAN GRADIENT: 3 MMHG
AV PEAK GRADIENT: 6 MMHG
AV VALVE AREA: 2.82 CM2
AV VELOCITY RATIO: 0.76
BARBITURATES UR QL SCN>200 NG/ML: NEGATIVE
BASOPHILS # BLD AUTO: 0.01 K/UL (ref 0–0.2)
BASOPHILS NFR BLD: 0.2 % (ref 0–1.9)
BENZODIAZ UR QL SCN>200 NG/ML: NEGATIVE
BILIRUB SERPL-MCNC: 0.7 MG/DL (ref 0.1–1)
BNP SERPL-MCNC: 180 PG/ML (ref 0–99)
BSA FOR ECHO PROCEDURE: 1.76 M2
BUN SERPL-MCNC: 30 MG/DL (ref 6–20)
BZE UR QL SCN: NORMAL
CALCIUM SERPL-MCNC: 8.5 MG/DL (ref 8.7–10.5)
CANNABINOIDS UR QL SCN: NORMAL
CHLORIDE SERPL-SCNC: 99 MMOL/L (ref 95–110)
CHOLEST SERPL-MCNC: 165 MG/DL (ref 120–199)
CHOLEST/HDLC SERPL: 4.9 {RATIO} (ref 2–5)
CK MB SERPL-MCNC: 2.5 NG/ML (ref 0.1–6.5)
CK MB SERPL-RTO: 1.7 % (ref 0–5)
CK SERPL-CCNC: 143 U/L (ref 20–200)
CO2 SERPL-SCNC: 25 MMOL/L (ref 23–29)
CREAT SERPL-MCNC: 2.1 MG/DL (ref 0.5–1.4)
CREAT SERPL-MCNC: 2.3 MG/DL (ref 0.5–1.4)
CREAT UR-MCNC: 72.6 MG/DL (ref 23–375)
CTP QC/QA: YES
CV ECHO LV RWT: 0.27 CM
DIFFERENTIAL METHOD: ABNORMAL
DOP CALC AO PEAK VEL: 1.27 M/S
DOP CALC AO VTI: 20.23 CM
DOP CALC LVOT AREA: 3.3 CM2
DOP CALC LVOT DIAMETER: 2.06 CM
DOP CALC LVOT PEAK VEL: 0.96 M/S
DOP CALC LVOT STROKE VOLUME: 57.06 CM3
DOP CALCLVOT PEAK VEL VTI: 17.13 CM
E WAVE DECELERATION TIME: 222.32 MSEC
E/A RATIO: 0.73
E/E' RATIO: 10.4 M/S
ECHO LV POSTERIOR WALL: 0.8 CM (ref 0.6–1.1)
EJECTION FRACTION: 55 %
EOSINOPHIL # BLD AUTO: 0.2 K/UL (ref 0–0.5)
EOSINOPHIL NFR BLD: 3.9 % (ref 0–8)
ERYTHROCYTE [DISTWIDTH] IN BLOOD BY AUTOMATED COUNT: 13.9 % (ref 11.5–14.5)
EST. GFR  (AFRICAN AMERICAN): 38 ML/MIN/1.73 M^2
EST. GFR  (NON AFRICAN AMERICAN): 33 ML/MIN/1.73 M^2
ESTIMATED AVG GLUCOSE: 108 MG/DL (ref 68–131)
FRACTIONAL SHORTENING: 26 % (ref 28–44)
GLUCOSE SERPL-MCNC: 113 MG/DL (ref 70–110)
GLUCOSE SERPL-MCNC: 99 MG/DL (ref 70–110)
HBA1C MFR BLD: 5.4 % (ref 4–5.6)
HCT VFR BLD AUTO: 28 % (ref 40–54)
HDLC SERPL-MCNC: 34 MG/DL (ref 40–75)
HDLC SERPL: 20.6 % (ref 20–50)
HGB BLD-MCNC: 9.5 G/DL (ref 14–18)
IMM GRANULOCYTES # BLD AUTO: 0.15 K/UL (ref 0–0.04)
IMM GRANULOCYTES NFR BLD AUTO: 2.9 % (ref 0–0.5)
INR PPP: 1 (ref 0.8–1.2)
INTERVENTRICULAR SEPTUM: 0.76 CM (ref 0.6–1.1)
IVRT: 91.34 MSEC
LA MAJOR: 5.44 CM
LA MINOR: 4.98 CM
LA WIDTH: 4.09 CM
LDLC SERPL CALC-MCNC: 112.8 MG/DL (ref 63–159)
LEFT ATRIUM SIZE: 3.29 CM
LEFT ATRIUM VOLUME INDEX MOD: 30.6 ML/M2
LEFT ATRIUM VOLUME INDEX: 33.4 ML/M2
LEFT ATRIUM VOLUME MOD: 54.39 CM3
LEFT ATRIUM VOLUME: 59.47 CM3
LEFT INTERNAL DIMENSION IN SYSTOLE: 4.39 CM (ref 2.1–4)
LEFT VENTRICLE DIASTOLIC VOLUME INDEX: 97.25 ML/M2
LEFT VENTRICLE DIASTOLIC VOLUME: 173.1 ML
LEFT VENTRICLE MASS INDEX: 98 G/M2
LEFT VENTRICLE SYSTOLIC VOLUME INDEX: 49.1 ML/M2
LEFT VENTRICLE SYSTOLIC VOLUME: 87.37 ML
LEFT VENTRICULAR INTERNAL DIMENSION IN DIASTOLE: 5.9 CM (ref 3.5–6)
LEFT VENTRICULAR MASS: 175.14 G
LV LATERAL E/E' RATIO: 8.67 M/S
LV SEPTAL E/E' RATIO: 13 M/S
LYMPHOCYTES # BLD AUTO: 0.7 K/UL (ref 1–4.8)
LYMPHOCYTES NFR BLD: 13.7 % (ref 18–48)
MCH RBC QN AUTO: 29.1 PG (ref 27–31)
MCHC RBC AUTO-ENTMCNC: 33.9 G/DL (ref 32–36)
MCV RBC AUTO: 86 FL (ref 82–98)
METHADONE UR QL SCN>300 NG/ML: NEGATIVE
MONOCYTES # BLD AUTO: 0.7 K/UL (ref 0.3–1)
MONOCYTES NFR BLD: 12.7 % (ref 4–15)
MV PEAK A VEL: 0.71 M/S
MV PEAK E VEL: 0.52 M/S
MV PEAK GRADIENT: 2 MMHG
MV STENOSIS PRESSURE HALF TIME: 64.47 MS
MV VALVE AREA P 1/2 METHOD: 3.41 CM2
NEUTROPHILS # BLD AUTO: 3.4 K/UL (ref 1.8–7.7)
NEUTROPHILS NFR BLD: 66.6 % (ref 38–73)
NONHDLC SERPL-MCNC: 131 MG/DL
NRBC BLD-RTO: 0 /100 WBC
OPIATES UR QL SCN: NEGATIVE
PCP UR QL SCN>25 NG/ML: NEGATIVE
PISA MRMAX VEL: 0.04 M/S
PISA TR MAX VEL: 2.39 M/S
PLATELET # BLD AUTO: 349 K/UL (ref 150–450)
PMV BLD AUTO: 10.2 FL (ref 9.2–12.9)
POC PTINR: 1.4 (ref 0.9–1.2)
POC PTWBT: 16.9 SEC (ref 9.7–14.3)
POTASSIUM SERPL-SCNC: 3.3 MMOL/L (ref 3.5–5.1)
PROT SERPL-MCNC: 8.2 G/DL (ref 6–8.4)
PROTHROMBIN TIME: 11 SEC (ref 9–12.5)
PULM VEIN S/D RATIO: 1.09
PV PEAK D VEL: 0.22 M/S
PV PEAK S VEL: 0.24 M/S
PV PEAK VELOCITY: 0.73 CM/S
RA MAJOR: 4.33 CM
RA PRESSURE: 3 MMHG
RA WIDTH: 3.77 CM
RBC # BLD AUTO: 3.27 M/UL (ref 4.6–6.2)
RV TISSUE DOPPLER FREE WALL SYSTOLIC VELOCITY 1 (APICAL 4 CHAMBER VIEW): 12.76 CM/S
SAMPLE: ABNORMAL
SAMPLE: ABNORMAL
SARS-COV-2 RDRP RESP QL NAA+PROBE: NEGATIVE
SODIUM SERPL-SCNC: 134 MMOL/L (ref 136–145)
STJ: 2.93 CM
TDI LATERAL: 0.06 M/S
TDI SEPTAL: 0.04 M/S
TDI: 0.05 M/S
TOXICOLOGY INFORMATION: NORMAL
TR MAX PG: 23 MMHG
TRIGL SERPL-MCNC: 91 MG/DL (ref 30–150)
TROPONIN I SERPL DL<=0.01 NG/ML-MCNC: 0.01 NG/ML (ref 0–0.03)
TROPONIN I SERPL DL<=0.01 NG/ML-MCNC: 0.04 NG/ML (ref 0–0.03)
TROPONIN I SERPL DL<=0.01 NG/ML-MCNC: 0.04 NG/ML (ref 0–0.03)
TSH SERPL DL<=0.005 MIU/L-ACNC: 1.39 UIU/ML (ref 0.4–4)
TV REST PULMONARY ARTERY PRESSURE: 26 MMHG
WBC # BLD AUTO: 5.1 K/UL (ref 3.9–12.7)

## 2021-04-22 PROCEDURE — 99214 PR OFFICE/OUTPT VISIT, EST, LEVL IV, 30-39 MIN: ICD-10-PCS | Mod: 95,,, | Performed by: PSYCHIATRY & NEUROLOGY

## 2021-04-22 PROCEDURE — 85610 PROTHROMBIN TIME: CPT

## 2021-04-22 PROCEDURE — 85610 PROTHROMBIN TIME: CPT | Performed by: EMERGENCY MEDICINE

## 2021-04-22 PROCEDURE — 99291 CRITICAL CARE FIRST HOUR: CPT | Mod: 25

## 2021-04-22 PROCEDURE — 83036 HEMOGLOBIN GLYCOSYLATED A1C: CPT | Performed by: NURSE PRACTITIONER

## 2021-04-22 PROCEDURE — 11000001 HC ACUTE MED/SURG PRIVATE ROOM

## 2021-04-22 PROCEDURE — 84484 ASSAY OF TROPONIN QUANT: CPT | Mod: 91 | Performed by: EMERGENCY MEDICINE

## 2021-04-22 PROCEDURE — 82550 ASSAY OF CK (CPK): CPT | Performed by: NURSE PRACTITIONER

## 2021-04-22 PROCEDURE — 92523 SPEECH SOUND LANG COMPREHEN: CPT

## 2021-04-22 PROCEDURE — 94761 N-INVAS EAR/PLS OXIMETRY MLT: CPT

## 2021-04-22 PROCEDURE — 93005 ELECTROCARDIOGRAM TRACING: CPT

## 2021-04-22 PROCEDURE — 25000003 PHARM REV CODE 250: Performed by: NURSE PRACTITIONER

## 2021-04-22 PROCEDURE — 93010 ELECTROCARDIOGRAM REPORT: CPT | Mod: 59,76,, | Performed by: INTERNAL MEDICINE

## 2021-04-22 PROCEDURE — 97110 THERAPEUTIC EXERCISES: CPT

## 2021-04-22 PROCEDURE — 80061 LIPID PANEL: CPT | Performed by: EMERGENCY MEDICINE

## 2021-04-22 PROCEDURE — 82565 ASSAY OF CREATININE: CPT

## 2021-04-22 PROCEDURE — 99214 OFFICE O/P EST MOD 30 MIN: CPT | Mod: 95,,, | Performed by: PSYCHIATRY & NEUROLOGY

## 2021-04-22 PROCEDURE — 25000003 PHARM REV CODE 250: Performed by: FAMILY MEDICINE

## 2021-04-22 PROCEDURE — 63600175 PHARM REV CODE 636 W HCPCS: Performed by: NURSE PRACTITIONER

## 2021-04-22 PROCEDURE — 97161 PT EVAL LOW COMPLEX 20 MIN: CPT

## 2021-04-22 PROCEDURE — 80307 DRUG TEST PRSMV CHEM ANLYZR: CPT | Performed by: NURSE PRACTITIONER

## 2021-04-22 PROCEDURE — 97166 OT EVAL MOD COMPLEX 45 MIN: CPT

## 2021-04-22 PROCEDURE — 84484 ASSAY OF TROPONIN QUANT: CPT | Performed by: NURSE PRACTITIONER

## 2021-04-22 PROCEDURE — 82553 CREATINE MB FRACTION: CPT | Performed by: NURSE PRACTITIONER

## 2021-04-22 PROCEDURE — 85025 COMPLETE CBC W/AUTO DIFF WBC: CPT | Performed by: EMERGENCY MEDICINE

## 2021-04-22 PROCEDURE — 92610 EVALUATE SWALLOWING FUNCTION: CPT

## 2021-04-22 PROCEDURE — 96365 THER/PROPH/DIAG IV INF INIT: CPT

## 2021-04-22 PROCEDURE — 84484 ASSAY OF TROPONIN QUANT: CPT | Mod: 91 | Performed by: FAMILY MEDICINE

## 2021-04-22 PROCEDURE — 87040 BLOOD CULTURE FOR BACTERIA: CPT | Mod: 59 | Performed by: NURSE PRACTITIONER

## 2021-04-22 PROCEDURE — 96366 THER/PROPH/DIAG IV INF ADDON: CPT

## 2021-04-22 PROCEDURE — 25000003 PHARM REV CODE 250: Performed by: EMERGENCY MEDICINE

## 2021-04-22 PROCEDURE — 93010 EKG 12-LEAD: ICD-10-PCS | Mod: ,,, | Performed by: INTERNAL MEDICINE

## 2021-04-22 PROCEDURE — 84443 ASSAY THYROID STIM HORMONE: CPT | Performed by: EMERGENCY MEDICINE

## 2021-04-22 PROCEDURE — U0002 COVID-19 LAB TEST NON-CDC: HCPCS | Performed by: EMERGENCY MEDICINE

## 2021-04-22 PROCEDURE — 83880 ASSAY OF NATRIURETIC PEPTIDE: CPT | Performed by: EMERGENCY MEDICINE

## 2021-04-22 PROCEDURE — 97535 SELF CARE MNGMENT TRAINING: CPT

## 2021-04-22 PROCEDURE — 99900035 HC TECH TIME PER 15 MIN (STAT)

## 2021-04-22 PROCEDURE — 97116 GAIT TRAINING THERAPY: CPT

## 2021-04-22 PROCEDURE — 80053 COMPREHEN METABOLIC PANEL: CPT | Performed by: EMERGENCY MEDICINE

## 2021-04-22 PROCEDURE — 93010 ELECTROCARDIOGRAM REPORT: CPT | Mod: ,,, | Performed by: INTERNAL MEDICINE

## 2021-04-22 RX ORDER — ONDANSETRON 2 MG/ML
4 INJECTION INTRAMUSCULAR; INTRAVENOUS EVERY 12 HOURS PRN
Status: DISCONTINUED | OUTPATIENT
Start: 2021-04-22 | End: 2021-04-27 | Stop reason: HOSPADM

## 2021-04-22 RX ORDER — ASPIRIN 81 MG/1
81 TABLET ORAL DAILY
Status: DISCONTINUED | OUTPATIENT
Start: 2021-04-22 | End: 2021-04-27 | Stop reason: HOSPADM

## 2021-04-22 RX ORDER — SODIUM CHLORIDE, SODIUM LACTATE, POTASSIUM CHLORIDE, CALCIUM CHLORIDE 600; 310; 30; 20 MG/100ML; MG/100ML; MG/100ML; MG/100ML
INJECTION, SOLUTION INTRAVENOUS CONTINUOUS
Status: DISCONTINUED | OUTPATIENT
Start: 2021-04-22 | End: 2021-04-26

## 2021-04-22 RX ORDER — DILTIAZEM HYDROCHLORIDE 5 MG/ML
10 INJECTION INTRAVENOUS
Status: COMPLETED | OUTPATIENT
Start: 2021-04-22 | End: 2021-04-22

## 2021-04-22 RX ORDER — NICARDIPINE HYDROCHLORIDE 0.2 MG/ML
0-15 INJECTION INTRAVENOUS CONTINUOUS
Status: DISCONTINUED | OUTPATIENT
Start: 2021-04-22 | End: 2021-04-22

## 2021-04-22 RX ORDER — ATORVASTATIN CALCIUM 40 MG/1
80 TABLET, FILM COATED ORAL DAILY
Status: DISCONTINUED | OUTPATIENT
Start: 2021-04-22 | End: 2021-04-27 | Stop reason: HOSPADM

## 2021-04-22 RX ORDER — DOXYCYCLINE HYCLATE 100 MG
100 TABLET ORAL EVERY 12 HOURS
Status: DISCONTINUED | OUTPATIENT
Start: 2021-04-22 | End: 2021-04-27 | Stop reason: HOSPADM

## 2021-04-22 RX ORDER — DAPSONE 25 MG/1
100 TABLET ORAL DAILY
Status: DISCONTINUED | OUTPATIENT
Start: 2021-04-22 | End: 2021-04-27 | Stop reason: HOSPADM

## 2021-04-22 RX ORDER — POTASSIUM CHLORIDE 20 MEQ/1
20 TABLET, EXTENDED RELEASE ORAL ONCE
Status: COMPLETED | OUTPATIENT
Start: 2021-04-22 | End: 2021-04-22

## 2021-04-22 RX ORDER — CLOPIDOGREL BISULFATE 75 MG/1
75 TABLET ORAL DAILY
Status: DISCONTINUED | OUTPATIENT
Start: 2021-04-22 | End: 2021-04-27 | Stop reason: HOSPADM

## 2021-04-22 RX ORDER — LABETALOL HYDROCHLORIDE 5 MG/ML
10 INJECTION, SOLUTION INTRAVENOUS
Status: DISCONTINUED | OUTPATIENT
Start: 2021-04-22 | End: 2021-04-27 | Stop reason: HOSPADM

## 2021-04-22 RX ORDER — ASPIRIN 325 MG
325 TABLET ORAL
Status: COMPLETED | OUTPATIENT
Start: 2021-04-22 | End: 2021-04-22

## 2021-04-22 RX ORDER — SODIUM CHLORIDE 0.9 % (FLUSH) 0.9 %
10 SYRINGE (ML) INJECTION
Status: DISCONTINUED | OUTPATIENT
Start: 2021-04-22 | End: 2021-04-27 | Stop reason: HOSPADM

## 2021-04-22 RX ORDER — ACETAMINOPHEN 325 MG/1
650 TABLET ORAL EVERY 6 HOURS PRN
Status: DISCONTINUED | OUTPATIENT
Start: 2021-04-22 | End: 2021-04-27 | Stop reason: HOSPADM

## 2021-04-22 RX ORDER — DOXYCYCLINE HYCLATE 100 MG
100 TABLET ORAL
Status: COMPLETED | OUTPATIENT
Start: 2021-04-22 | End: 2021-04-22

## 2021-04-22 RX ORDER — ENOXAPARIN SODIUM 100 MG/ML
30 INJECTION SUBCUTANEOUS EVERY 24 HOURS
Status: DISCONTINUED | OUTPATIENT
Start: 2021-04-22 | End: 2021-04-26

## 2021-04-22 RX ADMIN — SODIUM CHLORIDE, SODIUM LACTATE, POTASSIUM CHLORIDE, AND CALCIUM CHLORIDE: .6; .31; .03; .02 INJECTION, SOLUTION INTRAVENOUS at 04:04

## 2021-04-22 RX ADMIN — ASPIRIN 325 MG ORAL TABLET 325 MG: 325 PILL ORAL at 01:04

## 2021-04-22 RX ADMIN — ATORVASTATIN CALCIUM 80 MG: 40 TABLET, FILM COATED ORAL at 08:04

## 2021-04-22 RX ADMIN — DAPSONE 100 MG: 25 TABLET ORAL at 01:04

## 2021-04-22 RX ADMIN — DILTIAZEM HYDROCHLORIDE 10 MG: 5 INJECTION INTRAVENOUS at 08:04

## 2021-04-22 RX ADMIN — DOXYCYCLINE HYCLATE 100 MG: 100 TABLET, COATED ORAL at 01:04

## 2021-04-22 RX ADMIN — CLOPIDOGREL 75 MG: 75 TABLET, FILM COATED ORAL at 08:04

## 2021-04-22 RX ADMIN — Medication 81 MG: at 08:04

## 2021-04-22 RX ADMIN — POTASSIUM CHLORIDE 20 MEQ: 1500 TABLET, EXTENDED RELEASE ORAL at 04:04

## 2021-04-22 RX ADMIN — NICARDIPINE HYDROCHLORIDE 5 MG/HR: 0.2 INJECTION, SOLUTION INTRAVENOUS at 01:04

## 2021-04-22 RX ADMIN — SODIUM CHLORIDE, SODIUM LACTATE, POTASSIUM CHLORIDE, AND CALCIUM CHLORIDE: .6; .31; .03; .02 INJECTION, SOLUTION INTRAVENOUS at 05:04

## 2021-04-22 RX ADMIN — ENOXAPARIN SODIUM 30 MG: 30 INJECTION SUBCUTANEOUS at 06:04

## 2021-04-23 ENCOUNTER — CLINICAL SUPPORT (OUTPATIENT)
Dept: SMOKING CESSATION | Facility: CLINIC | Age: 61
End: 2021-04-23
Payer: COMMERCIAL

## 2021-04-23 DIAGNOSIS — F17.210 CIGARETTE SMOKER: Primary | ICD-10-CM

## 2021-04-23 LAB
ALBUMIN SERPL BCP-MCNC: 2.3 G/DL (ref 3.5–5.2)
ALP SERPL-CCNC: 82 U/L (ref 55–135)
ALT SERPL W/O P-5'-P-CCNC: 13 U/L (ref 10–44)
ANION GAP SERPL CALC-SCNC: 7 MMOL/L (ref 8–16)
AST SERPL-CCNC: 16 U/L (ref 10–40)
BASOPHILS # BLD AUTO: 0.02 K/UL (ref 0–0.2)
BASOPHILS NFR BLD: 0.4 % (ref 0–1.9)
BILIRUB SERPL-MCNC: 0.5 MG/DL (ref 0.1–1)
BUN SERPL-MCNC: 26 MG/DL (ref 6–20)
CALCIUM SERPL-MCNC: 8 MG/DL (ref 8.7–10.5)
CHLORIDE SERPL-SCNC: 105 MMOL/L (ref 95–110)
CO2 SERPL-SCNC: 24 MMOL/L (ref 23–29)
CREAT SERPL-MCNC: 1.6 MG/DL (ref 0.5–1.4)
DIFFERENTIAL METHOD: ABNORMAL
EOSINOPHIL # BLD AUTO: 0.3 K/UL (ref 0–0.5)
EOSINOPHIL NFR BLD: 5.8 % (ref 0–8)
ERYTHROCYTE [DISTWIDTH] IN BLOOD BY AUTOMATED COUNT: 14.6 % (ref 11.5–14.5)
EST. GFR  (AFRICAN AMERICAN): 53 ML/MIN/1.73 M^2
EST. GFR  (NON AFRICAN AMERICAN): 46 ML/MIN/1.73 M^2
GLUCOSE SERPL-MCNC: 108 MG/DL (ref 70–110)
HCT VFR BLD AUTO: 26.8 % (ref 40–54)
HGB BLD-MCNC: 8.7 G/DL (ref 14–18)
IMM GRANULOCYTES # BLD AUTO: 0.16 K/UL (ref 0–0.04)
IMM GRANULOCYTES NFR BLD AUTO: 3.1 % (ref 0–0.5)
LYMPHOCYTES # BLD AUTO: 0.7 K/UL (ref 1–4.8)
LYMPHOCYTES NFR BLD: 13.8 % (ref 18–48)
MAGNESIUM SERPL-MCNC: 1.8 MG/DL (ref 1.6–2.6)
MCH RBC QN AUTO: 28.5 PG (ref 27–31)
MCHC RBC AUTO-ENTMCNC: 32.5 G/DL (ref 32–36)
MCV RBC AUTO: 88 FL (ref 82–98)
MONOCYTES # BLD AUTO: 0.9 K/UL (ref 0.3–1)
MONOCYTES NFR BLD: 16.7 % (ref 4–15)
NEUTROPHILS # BLD AUTO: 3.1 K/UL (ref 1.8–7.7)
NEUTROPHILS NFR BLD: 60.2 % (ref 38–73)
NRBC BLD-RTO: 0 /100 WBC
PHOSPHATE SERPL-MCNC: 2.7 MG/DL (ref 2.7–4.5)
PLATELET # BLD AUTO: 417 K/UL (ref 150–450)
PMV BLD AUTO: 10.6 FL (ref 9.2–12.9)
POTASSIUM SERPL-SCNC: 4 MMOL/L (ref 3.5–5.1)
PROT SERPL-MCNC: 6.8 G/DL (ref 6–8.4)
RBC # BLD AUTO: 3.05 M/UL (ref 4.6–6.2)
SODIUM SERPL-SCNC: 136 MMOL/L (ref 136–145)
WBC # BLD AUTO: 5.21 K/UL (ref 3.9–12.7)

## 2021-04-23 PROCEDURE — 94761 N-INVAS EAR/PLS OXIMETRY MLT: CPT

## 2021-04-23 PROCEDURE — 25000003 PHARM REV CODE 250: Performed by: FAMILY MEDICINE

## 2021-04-23 PROCEDURE — 97112 NEUROMUSCULAR REEDUCATION: CPT | Mod: CO

## 2021-04-23 PROCEDURE — 99900035 HC TECH TIME PER 15 MIN (STAT)

## 2021-04-23 PROCEDURE — 25000003 PHARM REV CODE 250: Performed by: EMERGENCY MEDICINE

## 2021-04-23 PROCEDURE — 97530 THERAPEUTIC ACTIVITIES: CPT

## 2021-04-23 PROCEDURE — 97110 THERAPEUTIC EXERCISES: CPT | Mod: CO

## 2021-04-23 PROCEDURE — 36415 COLL VENOUS BLD VENIPUNCTURE: CPT | Performed by: NURSE PRACTITIONER

## 2021-04-23 PROCEDURE — 84100 ASSAY OF PHOSPHORUS: CPT | Performed by: NURSE PRACTITIONER

## 2021-04-23 PROCEDURE — 99407 PR TOBACCO USE CESSATION INTENSIVE >10 MINUTES: ICD-10-PCS | Mod: S$GLB,,,

## 2021-04-23 PROCEDURE — 63600175 PHARM REV CODE 636 W HCPCS: Performed by: NURSE PRACTITIONER

## 2021-04-23 PROCEDURE — 99407 BEHAV CHNG SMOKING > 10 MIN: CPT | Mod: S$GLB,,,

## 2021-04-23 PROCEDURE — 99999 PR PBB SHADOW E&M-EST. PATIENT-LVL I: ICD-10-PCS | Mod: PBBFAC,,,

## 2021-04-23 PROCEDURE — 11000001 HC ACUTE MED/SURG PRIVATE ROOM

## 2021-04-23 PROCEDURE — S4991 NICOTINE PATCH NONLEGEND: HCPCS | Performed by: FAMILY MEDICINE

## 2021-04-23 PROCEDURE — 85025 COMPLETE CBC W/AUTO DIFF WBC: CPT | Performed by: NURSE PRACTITIONER

## 2021-04-23 PROCEDURE — 99999 PR PBB SHADOW E&M-EST. PATIENT-LVL I: CPT | Mod: PBBFAC,,,

## 2021-04-23 PROCEDURE — 97116 GAIT TRAINING THERAPY: CPT

## 2021-04-23 PROCEDURE — 25000003 PHARM REV CODE 250: Performed by: NURSE PRACTITIONER

## 2021-04-23 PROCEDURE — 80053 COMPREHEN METABOLIC PANEL: CPT | Performed by: NURSE PRACTITIONER

## 2021-04-23 PROCEDURE — 83735 ASSAY OF MAGNESIUM: CPT | Performed by: NURSE PRACTITIONER

## 2021-04-23 PROCEDURE — 97110 THERAPEUTIC EXERCISES: CPT

## 2021-04-23 RX ORDER — METOPROLOL SUCCINATE 50 MG/1
50 TABLET, EXTENDED RELEASE ORAL DAILY
Status: DISCONTINUED | OUTPATIENT
Start: 2021-04-23 | End: 2021-04-27 | Stop reason: HOSPADM

## 2021-04-23 RX ORDER — HYDROCHLOROTHIAZIDE 25 MG/1
25 TABLET ORAL DAILY
Status: DISCONTINUED | OUTPATIENT
Start: 2021-04-23 | End: 2021-04-27 | Stop reason: HOSPADM

## 2021-04-23 RX ORDER — IBUPROFEN 200 MG
1 TABLET ORAL DAILY
Status: DISCONTINUED | OUTPATIENT
Start: 2021-04-23 | End: 2021-04-27 | Stop reason: HOSPADM

## 2021-04-23 RX ORDER — NIFEDIPINE 30 MG/1
90 TABLET, EXTENDED RELEASE ORAL DAILY
Status: DISCONTINUED | OUTPATIENT
Start: 2021-04-23 | End: 2021-04-27 | Stop reason: HOSPADM

## 2021-04-23 RX ADMIN — LABETALOL HYDROCHLORIDE 10 MG: 5 INJECTION, SOLUTION INTRAVENOUS at 05:04

## 2021-04-23 RX ADMIN — ATORVASTATIN CALCIUM 80 MG: 40 TABLET, FILM COATED ORAL at 08:04

## 2021-04-23 RX ADMIN — CLOPIDOGREL 75 MG: 75 TABLET, FILM COATED ORAL at 08:04

## 2021-04-23 RX ADMIN — DOXYCYCLINE HYCLATE 100 MG: 100 TABLET, COATED ORAL at 01:04

## 2021-04-23 RX ADMIN — ENOXAPARIN SODIUM 30 MG: 30 INJECTION SUBCUTANEOUS at 05:04

## 2021-04-23 RX ADMIN — Medication 81 MG: at 08:04

## 2021-04-23 RX ADMIN — METOPROLOL SUCCINATE 50 MG: 50 TABLET, EXTENDED RELEASE ORAL at 08:04

## 2021-04-23 RX ADMIN — NIFEDIPINE 90 MG: 30 TABLET, FILM COATED, EXTENDED RELEASE ORAL at 08:04

## 2021-04-23 RX ADMIN — HYDROCHLOROTHIAZIDE 25 MG: 25 TABLET ORAL at 08:04

## 2021-04-23 RX ADMIN — Medication 1 PATCH: at 01:04

## 2021-04-23 RX ADMIN — DAPSONE 100 MG: 25 TABLET ORAL at 08:04

## 2021-04-23 RX ADMIN — SODIUM CHLORIDE, SODIUM LACTATE, POTASSIUM CHLORIDE, AND CALCIUM CHLORIDE: .6; .31; .03; .02 INJECTION, SOLUTION INTRAVENOUS at 04:04

## 2021-04-24 LAB
ALBUMIN SERPL BCP-MCNC: 2.6 G/DL (ref 3.5–5.2)
ALP SERPL-CCNC: 84 U/L (ref 55–135)
ALT SERPL W/O P-5'-P-CCNC: 12 U/L (ref 10–44)
ANION GAP SERPL CALC-SCNC: 11 MMOL/L (ref 8–16)
AST SERPL-CCNC: 16 U/L (ref 10–40)
BASOPHILS # BLD AUTO: 0.02 K/UL (ref 0–0.2)
BASOPHILS NFR BLD: 0.3 % (ref 0–1.9)
BILIRUB SERPL-MCNC: 0.9 MG/DL (ref 0.1–1)
BUN SERPL-MCNC: 19 MG/DL (ref 6–20)
CALCIUM SERPL-MCNC: 8.6 MG/DL (ref 8.7–10.5)
CHLORIDE SERPL-SCNC: 103 MMOL/L (ref 95–110)
CO2 SERPL-SCNC: 23 MMOL/L (ref 23–29)
CREAT SERPL-MCNC: 1.4 MG/DL (ref 0.5–1.4)
DIFFERENTIAL METHOD: ABNORMAL
EOSINOPHIL # BLD AUTO: 0.3 K/UL (ref 0–0.5)
EOSINOPHIL NFR BLD: 4.7 % (ref 0–8)
ERYTHROCYTE [DISTWIDTH] IN BLOOD BY AUTOMATED COUNT: 14.7 % (ref 11.5–14.5)
EST. GFR  (AFRICAN AMERICAN): >60 ML/MIN/1.73 M^2
EST. GFR  (NON AFRICAN AMERICAN): 54 ML/MIN/1.73 M^2
GLUCOSE SERPL-MCNC: 104 MG/DL (ref 70–110)
HCT VFR BLD AUTO: 28.7 % (ref 40–54)
HGB BLD-MCNC: 9.5 G/DL (ref 14–18)
IMM GRANULOCYTES # BLD AUTO: 0.28 K/UL (ref 0–0.04)
IMM GRANULOCYTES NFR BLD AUTO: 4.4 % (ref 0–0.5)
LYMPHOCYTES # BLD AUTO: 0.8 K/UL (ref 1–4.8)
LYMPHOCYTES NFR BLD: 12.8 % (ref 18–48)
MCH RBC QN AUTO: 28.5 PG (ref 27–31)
MCHC RBC AUTO-ENTMCNC: 33.1 G/DL (ref 32–36)
MCV RBC AUTO: 86 FL (ref 82–98)
MONOCYTES # BLD AUTO: 0.8 K/UL (ref 0.3–1)
MONOCYTES NFR BLD: 12.3 % (ref 4–15)
NEUTROPHILS # BLD AUTO: 4.1 K/UL (ref 1.8–7.7)
NEUTROPHILS NFR BLD: 65.5 % (ref 38–73)
NRBC BLD-RTO: 0 /100 WBC
PLATELET # BLD AUTO: 556 K/UL (ref 150–450)
PMV BLD AUTO: 10.4 FL (ref 9.2–12.9)
POTASSIUM SERPL-SCNC: 3.6 MMOL/L (ref 3.5–5.1)
PROT SERPL-MCNC: 7.4 G/DL (ref 6–8.4)
RBC # BLD AUTO: 3.33 M/UL (ref 4.6–6.2)
SODIUM SERPL-SCNC: 137 MMOL/L (ref 136–145)
WBC # BLD AUTO: 6.32 K/UL (ref 3.9–12.7)

## 2021-04-24 PROCEDURE — 85025 COMPLETE CBC W/AUTO DIFF WBC: CPT | Performed by: NURSE PRACTITIONER

## 2021-04-24 PROCEDURE — 94761 N-INVAS EAR/PLS OXIMETRY MLT: CPT

## 2021-04-24 PROCEDURE — 11000001 HC ACUTE MED/SURG PRIVATE ROOM

## 2021-04-24 PROCEDURE — 25000003 PHARM REV CODE 250: Performed by: EMERGENCY MEDICINE

## 2021-04-24 PROCEDURE — S4991 NICOTINE PATCH NONLEGEND: HCPCS | Performed by: FAMILY MEDICINE

## 2021-04-24 PROCEDURE — 25000003 PHARM REV CODE 250: Performed by: FAMILY MEDICINE

## 2021-04-24 PROCEDURE — 80053 COMPREHEN METABOLIC PANEL: CPT | Performed by: NURSE PRACTITIONER

## 2021-04-24 PROCEDURE — 36415 COLL VENOUS BLD VENIPUNCTURE: CPT | Performed by: NURSE PRACTITIONER

## 2021-04-24 PROCEDURE — 25000003 PHARM REV CODE 250: Performed by: NURSE PRACTITIONER

## 2021-04-24 PROCEDURE — 97116 GAIT TRAINING THERAPY: CPT | Mod: CQ

## 2021-04-24 PROCEDURE — 97110 THERAPEUTIC EXERCISES: CPT | Mod: CQ

## 2021-04-24 PROCEDURE — 63600175 PHARM REV CODE 636 W HCPCS: Performed by: NURSE PRACTITIONER

## 2021-04-24 RX ORDER — HYDRALAZINE HYDROCHLORIDE 20 MG/ML
10 INJECTION INTRAMUSCULAR; INTRAVENOUS ONCE
Status: DISCONTINUED | OUTPATIENT
Start: 2021-04-24 | End: 2021-04-27 | Stop reason: HOSPADM

## 2021-04-24 RX ADMIN — CLOPIDOGREL 75 MG: 75 TABLET, FILM COATED ORAL at 09:04

## 2021-04-24 RX ADMIN — NIFEDIPINE 90 MG: 30 TABLET, FILM COATED, EXTENDED RELEASE ORAL at 09:04

## 2021-04-24 RX ADMIN — DAPSONE 100 MG: 25 TABLET ORAL at 09:04

## 2021-04-24 RX ADMIN — ENOXAPARIN SODIUM 30 MG: 30 INJECTION SUBCUTANEOUS at 04:04

## 2021-04-24 RX ADMIN — Medication 81 MG: at 09:04

## 2021-04-24 RX ADMIN — SODIUM CHLORIDE, SODIUM LACTATE, POTASSIUM CHLORIDE, AND CALCIUM CHLORIDE: .6; .31; .03; .02 INJECTION, SOLUTION INTRAVENOUS at 07:04

## 2021-04-24 RX ADMIN — Medication 1 PATCH: at 09:04

## 2021-04-24 RX ADMIN — SODIUM CHLORIDE, SODIUM LACTATE, POTASSIUM CHLORIDE, AND CALCIUM CHLORIDE: .6; .31; .03; .02 INJECTION, SOLUTION INTRAVENOUS at 12:04

## 2021-04-24 RX ADMIN — ATORVASTATIN CALCIUM 80 MG: 40 TABLET, FILM COATED ORAL at 09:04

## 2021-04-24 RX ADMIN — DOXYCYCLINE HYCLATE 100 MG: 100 TABLET, COATED ORAL at 12:04

## 2021-04-24 RX ADMIN — SODIUM CHLORIDE, SODIUM LACTATE, POTASSIUM CHLORIDE, AND CALCIUM CHLORIDE: .6; .31; .03; .02 INJECTION, SOLUTION INTRAVENOUS at 10:04

## 2021-04-24 RX ADMIN — HYDROCHLOROTHIAZIDE 25 MG: 25 TABLET ORAL at 09:04

## 2021-04-24 RX ADMIN — METOPROLOL SUCCINATE 50 MG: 50 TABLET, EXTENDED RELEASE ORAL at 09:04

## 2021-04-25 PROCEDURE — 25000003 PHARM REV CODE 250: Performed by: NURSE PRACTITIONER

## 2021-04-25 PROCEDURE — 94761 N-INVAS EAR/PLS OXIMETRY MLT: CPT

## 2021-04-25 PROCEDURE — 25000003 PHARM REV CODE 250: Performed by: EMERGENCY MEDICINE

## 2021-04-25 PROCEDURE — S4991 NICOTINE PATCH NONLEGEND: HCPCS | Performed by: FAMILY MEDICINE

## 2021-04-25 PROCEDURE — 11000001 HC ACUTE MED/SURG PRIVATE ROOM

## 2021-04-25 PROCEDURE — 63600175 PHARM REV CODE 636 W HCPCS: Performed by: NURSE PRACTITIONER

## 2021-04-25 PROCEDURE — 25000003 PHARM REV CODE 250: Performed by: FAMILY MEDICINE

## 2021-04-25 RX ORDER — LISINOPRIL 10 MG/1
10 TABLET ORAL DAILY
Status: DISCONTINUED | OUTPATIENT
Start: 2021-04-25 | End: 2021-04-27 | Stop reason: HOSPADM

## 2021-04-25 RX ADMIN — HYDROCHLOROTHIAZIDE 25 MG: 25 TABLET ORAL at 09:04

## 2021-04-25 RX ADMIN — ENOXAPARIN SODIUM 30 MG: 30 INJECTION SUBCUTANEOUS at 06:04

## 2021-04-25 RX ADMIN — DOXYCYCLINE HYCLATE 100 MG: 100 TABLET, COATED ORAL at 12:04

## 2021-04-25 RX ADMIN — ATORVASTATIN CALCIUM 80 MG: 40 TABLET, FILM COATED ORAL at 09:04

## 2021-04-25 RX ADMIN — METOPROLOL SUCCINATE 50 MG: 50 TABLET, EXTENDED RELEASE ORAL at 09:04

## 2021-04-25 RX ADMIN — SODIUM CHLORIDE, SODIUM LACTATE, POTASSIUM CHLORIDE, AND CALCIUM CHLORIDE: .6; .31; .03; .02 INJECTION, SOLUTION INTRAVENOUS at 05:04

## 2021-04-25 RX ADMIN — CLOPIDOGREL 75 MG: 75 TABLET, FILM COATED ORAL at 09:04

## 2021-04-25 RX ADMIN — Medication 81 MG: at 09:04

## 2021-04-25 RX ADMIN — DAPSONE 100 MG: 25 TABLET ORAL at 09:04

## 2021-04-25 RX ADMIN — SODIUM CHLORIDE, SODIUM LACTATE, POTASSIUM CHLORIDE, AND CALCIUM CHLORIDE: .6; .31; .03; .02 INJECTION, SOLUTION INTRAVENOUS at 06:04

## 2021-04-25 RX ADMIN — NIFEDIPINE 90 MG: 30 TABLET, FILM COATED, EXTENDED RELEASE ORAL at 09:04

## 2021-04-25 RX ADMIN — Medication 1 PATCH: at 09:04

## 2021-04-25 RX ADMIN — LISINOPRIL 10 MG: 10 TABLET ORAL at 09:04

## 2021-04-26 ENCOUNTER — CLINICAL SUPPORT (OUTPATIENT)
Dept: SMOKING CESSATION | Facility: CLINIC | Age: 61
End: 2021-04-26
Payer: COMMERCIAL

## 2021-04-26 DIAGNOSIS — F17.210 CIGARETTE SMOKER: Primary | ICD-10-CM

## 2021-04-26 PROCEDURE — 25000003 PHARM REV CODE 250: Performed by: EMERGENCY MEDICINE

## 2021-04-26 PROCEDURE — 99407 BEHAV CHNG SMOKING > 10 MIN: CPT | Mod: S$GLB,,,

## 2021-04-26 PROCEDURE — 97116 GAIT TRAINING THERAPY: CPT | Mod: CQ

## 2021-04-26 PROCEDURE — 99407 PR TOBACCO USE CESSATION INTENSIVE >10 MINUTES: ICD-10-PCS | Mod: S$GLB,,,

## 2021-04-26 PROCEDURE — S4991 NICOTINE PATCH NONLEGEND: HCPCS | Performed by: FAMILY MEDICINE

## 2021-04-26 PROCEDURE — 25000003 PHARM REV CODE 250: Performed by: NURSE PRACTITIONER

## 2021-04-26 PROCEDURE — 25000003 PHARM REV CODE 250: Performed by: FAMILY MEDICINE

## 2021-04-26 PROCEDURE — 97110 THERAPEUTIC EXERCISES: CPT | Mod: CO

## 2021-04-26 PROCEDURE — 97530 THERAPEUTIC ACTIVITIES: CPT | Mod: CQ

## 2021-04-26 PROCEDURE — 94761 N-INVAS EAR/PLS OXIMETRY MLT: CPT

## 2021-04-26 PROCEDURE — 63600175 PHARM REV CODE 636 W HCPCS: Performed by: FAMILY MEDICINE

## 2021-04-26 PROCEDURE — 11000001 HC ACUTE MED/SURG PRIVATE ROOM

## 2021-04-26 PROCEDURE — 97535 SELF CARE MNGMENT TRAINING: CPT | Mod: CO

## 2021-04-26 PROCEDURE — 63600175 PHARM REV CODE 636 W HCPCS: Performed by: NURSE PRACTITIONER

## 2021-04-26 RX ORDER — ENOXAPARIN SODIUM 100 MG/ML
40 INJECTION SUBCUTANEOUS EVERY 24 HOURS
Status: DISCONTINUED | OUTPATIENT
Start: 2021-04-26 | End: 2021-04-27 | Stop reason: HOSPADM

## 2021-04-26 RX ADMIN — Medication 1 PATCH: at 10:04

## 2021-04-26 RX ADMIN — METOPROLOL SUCCINATE 50 MG: 50 TABLET, EXTENDED RELEASE ORAL at 09:04

## 2021-04-26 RX ADMIN — ENOXAPARIN SODIUM 40 MG: 40 INJECTION SUBCUTANEOUS at 05:04

## 2021-04-26 RX ADMIN — SODIUM CHLORIDE, SODIUM LACTATE, POTASSIUM CHLORIDE, AND CALCIUM CHLORIDE: .6; .31; .03; .02 INJECTION, SOLUTION INTRAVENOUS at 03:04

## 2021-04-26 RX ADMIN — HYDROCHLOROTHIAZIDE 25 MG: 25 TABLET ORAL at 09:04

## 2021-04-26 RX ADMIN — Medication 81 MG: at 09:04

## 2021-04-26 RX ADMIN — DOXYCYCLINE HYCLATE 100 MG: 100 TABLET, COATED ORAL at 12:04

## 2021-04-26 RX ADMIN — LISINOPRIL 10 MG: 10 TABLET ORAL at 09:04

## 2021-04-26 RX ADMIN — DOXYCYCLINE HYCLATE 100 MG: 100 TABLET, COATED ORAL at 02:04

## 2021-04-26 RX ADMIN — NIFEDIPINE 90 MG: 30 TABLET, FILM COATED, EXTENDED RELEASE ORAL at 09:04

## 2021-04-26 RX ADMIN — DAPSONE 100 MG: 25 TABLET ORAL at 09:04

## 2021-04-26 RX ADMIN — CLOPIDOGREL 75 MG: 75 TABLET, FILM COATED ORAL at 09:04

## 2021-04-26 RX ADMIN — ATORVASTATIN CALCIUM 80 MG: 40 TABLET, FILM COATED ORAL at 09:04

## 2021-04-27 VITALS
SYSTOLIC BLOOD PRESSURE: 115 MMHG | DIASTOLIC BLOOD PRESSURE: 65 MMHG | HEART RATE: 60 BPM | BODY MASS INDEX: 20.61 KG/M2 | TEMPERATURE: 98 F | OXYGEN SATURATION: 96 % | WEIGHT: 139.13 LBS | RESPIRATION RATE: 16 BRPM | HEIGHT: 69 IN

## 2021-04-27 LAB
BACTERIA BLD CULT: NORMAL
BACTERIA BLD CULT: NORMAL

## 2021-04-27 PROCEDURE — 97530 THERAPEUTIC ACTIVITIES: CPT | Mod: CQ

## 2021-04-27 PROCEDURE — 97535 SELF CARE MNGMENT TRAINING: CPT | Mod: CO

## 2021-04-27 PROCEDURE — 97110 THERAPEUTIC EXERCISES: CPT | Mod: CO

## 2021-04-27 PROCEDURE — 97116 GAIT TRAINING THERAPY: CPT | Mod: CQ

## 2021-04-27 PROCEDURE — 99222 PR INITIAL HOSPITAL CARE,LEVL II: ICD-10-PCS | Mod: ,,, | Performed by: PHYSICAL MEDICINE & REHABILITATION

## 2021-04-27 PROCEDURE — 25000003 PHARM REV CODE 250: Performed by: FAMILY MEDICINE

## 2021-04-27 PROCEDURE — 25000003 PHARM REV CODE 250: Performed by: NURSE PRACTITIONER

## 2021-04-27 PROCEDURE — 25000003 PHARM REV CODE 250: Performed by: EMERGENCY MEDICINE

## 2021-04-27 PROCEDURE — 99222 1ST HOSP IP/OBS MODERATE 55: CPT | Mod: ,,, | Performed by: PHYSICAL MEDICINE & REHABILITATION

## 2021-04-27 PROCEDURE — 94761 N-INVAS EAR/PLS OXIMETRY MLT: CPT

## 2021-04-27 PROCEDURE — S4991 NICOTINE PATCH NONLEGEND: HCPCS | Performed by: FAMILY MEDICINE

## 2021-04-27 RX ORDER — IBUPROFEN 200 MG
1 TABLET ORAL DAILY
Status: CANCELLED | OUTPATIENT
Start: 2021-04-28

## 2021-04-27 RX ORDER — LISINOPRIL 10 MG/1
10 TABLET ORAL DAILY
Status: CANCELLED | OUTPATIENT
Start: 2021-04-28

## 2021-04-27 RX ORDER — CLOPIDOGREL BISULFATE 75 MG/1
75 TABLET ORAL DAILY
Status: CANCELLED | OUTPATIENT
Start: 2021-04-28

## 2021-04-27 RX ORDER — ATORVASTATIN CALCIUM 40 MG/1
80 TABLET, FILM COATED ORAL DAILY
Status: CANCELLED | OUTPATIENT
Start: 2021-04-28

## 2021-04-27 RX ORDER — DAPSONE 25 MG/1
100 TABLET ORAL DAILY
Status: CANCELLED | OUTPATIENT
Start: 2021-04-28

## 2021-04-27 RX ORDER — ENOXAPARIN SODIUM 100 MG/ML
40 INJECTION SUBCUTANEOUS EVERY 24 HOURS
Status: CANCELLED | OUTPATIENT
Start: 2021-04-27

## 2021-04-27 RX ORDER — NIFEDIPINE 30 MG/1
90 TABLET, EXTENDED RELEASE ORAL DAILY
Status: CANCELLED | OUTPATIENT
Start: 2021-04-28

## 2021-04-27 RX ORDER — DOXYCYCLINE HYCLATE 100 MG
100 TABLET ORAL EVERY 12 HOURS
Status: CANCELLED | OUTPATIENT
Start: 2021-04-28

## 2021-04-27 RX ORDER — ONDANSETRON 2 MG/ML
4 INJECTION INTRAMUSCULAR; INTRAVENOUS EVERY 12 HOURS PRN
Status: CANCELLED | OUTPATIENT
Start: 2021-04-27

## 2021-04-27 RX ORDER — HYDROCHLOROTHIAZIDE 25 MG/1
25 TABLET ORAL DAILY
Status: CANCELLED | OUTPATIENT
Start: 2021-04-28

## 2021-04-27 RX ORDER — SODIUM CHLORIDE 0.9 % (FLUSH) 0.9 %
10 SYRINGE (ML) INJECTION
Status: CANCELLED | OUTPATIENT
Start: 2021-04-27

## 2021-04-27 RX ORDER — METOPROLOL SUCCINATE 50 MG/1
50 TABLET, EXTENDED RELEASE ORAL DAILY
Status: CANCELLED | OUTPATIENT
Start: 2021-04-28

## 2021-04-27 RX ORDER — ACETAMINOPHEN 325 MG/1
650 TABLET ORAL EVERY 6 HOURS PRN
Status: CANCELLED | OUTPATIENT
Start: 2021-04-27

## 2021-04-27 RX ORDER — ASPIRIN 81 MG/1
81 TABLET ORAL DAILY
Status: CANCELLED | OUTPATIENT
Start: 2021-04-28

## 2021-04-27 RX ADMIN — ATORVASTATIN CALCIUM 80 MG: 40 TABLET, FILM COATED ORAL at 09:04

## 2021-04-27 RX ADMIN — DAPSONE 100 MG: 25 TABLET ORAL at 09:04

## 2021-04-27 RX ADMIN — DOXYCYCLINE HYCLATE 100 MG: 100 TABLET, COATED ORAL at 12:04

## 2021-04-27 RX ADMIN — HYDROCHLOROTHIAZIDE 25 MG: 25 TABLET ORAL at 09:04

## 2021-04-27 RX ADMIN — Medication 81 MG: at 09:04

## 2021-04-27 RX ADMIN — LISINOPRIL 10 MG: 10 TABLET ORAL at 09:04

## 2021-04-27 RX ADMIN — CLOPIDOGREL 75 MG: 75 TABLET, FILM COATED ORAL at 09:04

## 2021-04-27 RX ADMIN — Medication 1 PATCH: at 09:04

## 2021-04-27 RX ADMIN — NIFEDIPINE 90 MG: 30 TABLET, FILM COATED, EXTENDED RELEASE ORAL at 09:04

## 2021-04-27 RX ADMIN — METOPROLOL SUCCINATE 50 MG: 50 TABLET, EXTENDED RELEASE ORAL at 09:04

## 2021-05-01 ENCOUNTER — HOSPITAL ENCOUNTER (INPATIENT)
Facility: HOSPITAL | Age: 61
LOS: 4 days | Discharge: NURSING, REHAB OR LONG-TERM CARE | DRG: 064 | End: 2021-05-05
Attending: EMERGENCY MEDICINE | Admitting: PSYCHIATRY & NEUROLOGY
Payer: MEDICAID

## 2021-05-01 DIAGNOSIS — B20 AIDS (ACQUIRED IMMUNE DEFICIENCY SYNDROME): ICD-10-CM

## 2021-05-01 DIAGNOSIS — Z86.73 HISTORY OF STROKE: ICD-10-CM

## 2021-05-01 DIAGNOSIS — I63.311 CEREBROVASCULAR ACCIDENT (CVA) DUE TO THROMBOSIS OF RIGHT MIDDLE CEREBRAL ARTERY: Primary | ICD-10-CM

## 2021-05-01 DIAGNOSIS — R20.0 LEFT FACIAL NUMBNESS: ICD-10-CM

## 2021-05-01 DIAGNOSIS — I63.9 ACUTE ISCHEMIC STROKE: ICD-10-CM

## 2021-05-01 DIAGNOSIS — I63.9 STROKE: ICD-10-CM

## 2021-05-01 LAB
CTP QC/QA: YES
SARS-COV-2 RDRP RESP QL NAA+PROBE: NEGATIVE

## 2021-05-01 PROCEDURE — 12000002 HC ACUTE/MED SURGE SEMI-PRIVATE ROOM

## 2021-05-01 PROCEDURE — 99223 PR INITIAL HOSPITAL CARE,LEVL III: ICD-10-PCS | Mod: ,,, | Performed by: PSYCHIATRY & NEUROLOGY

## 2021-05-01 PROCEDURE — 99285 EMERGENCY DEPT VISIT HI MDM: CPT | Mod: ,,, | Performed by: EMERGENCY MEDICINE

## 2021-05-01 PROCEDURE — 25000003 PHARM REV CODE 250: Performed by: NURSE PRACTITIONER

## 2021-05-01 PROCEDURE — U0002 COVID-19 LAB TEST NON-CDC: HCPCS | Performed by: PHYSICIAN ASSISTANT

## 2021-05-01 PROCEDURE — 99285 PR EMERGENCY DEPT VISIT,LEVEL V: ICD-10-PCS | Mod: ,,, | Performed by: EMERGENCY MEDICINE

## 2021-05-01 PROCEDURE — 99285 EMERGENCY DEPT VISIT HI MDM: CPT | Mod: 25

## 2021-05-01 PROCEDURE — 99223 1ST HOSP IP/OBS HIGH 75: CPT | Mod: ,,, | Performed by: PSYCHIATRY & NEUROLOGY

## 2021-05-01 RX ORDER — METOPROLOL SUCCINATE 50 MG/1
50 TABLET, EXTENDED RELEASE ORAL DAILY
Status: DISCONTINUED | OUTPATIENT
Start: 2021-05-02 | End: 2021-05-02

## 2021-05-01 RX ORDER — PRAVASTATIN SODIUM 40 MG/1
40 TABLET ORAL NIGHTLY
Status: DISCONTINUED | OUTPATIENT
Start: 2021-05-02 | End: 2021-05-03

## 2021-05-01 RX ORDER — FOLIC ACID 1 MG/1
1 TABLET ORAL DAILY
Status: DISCONTINUED | OUTPATIENT
Start: 2021-05-02 | End: 2021-05-05 | Stop reason: HOSPADM

## 2021-05-01 RX ORDER — DAPSONE 100 MG/1
100 TABLET ORAL DAILY
Status: DISCONTINUED | OUTPATIENT
Start: 2021-05-02 | End: 2021-05-04

## 2021-05-01 RX ORDER — FERROUS SULFATE 325(65) MG
325 TABLET, DELAYED RELEASE (ENTERIC COATED) ORAL DAILY
Status: DISCONTINUED | OUTPATIENT
Start: 2021-05-02 | End: 2021-05-05 | Stop reason: HOSPADM

## 2021-05-01 RX ORDER — ASPIRIN 81 MG/1
81 TABLET ORAL DAILY
Status: DISCONTINUED | OUTPATIENT
Start: 2021-05-02 | End: 2021-05-04

## 2021-05-01 RX ORDER — HYDROCHLOROTHIAZIDE 25 MG/1
25 TABLET ORAL DAILY
Status: DISCONTINUED | OUTPATIENT
Start: 2021-05-02 | End: 2021-05-02

## 2021-05-01 RX ORDER — ACETAMINOPHEN 325 MG/1
650 TABLET ORAL EVERY 6 HOURS PRN
Status: DISCONTINUED | OUTPATIENT
Start: 2021-05-02 | End: 2021-05-05 | Stop reason: HOSPADM

## 2021-05-01 RX ORDER — SODIUM CHLORIDE 0.9 % (FLUSH) 0.9 %
10 SYRINGE (ML) INJECTION
Status: DISCONTINUED | OUTPATIENT
Start: 2021-05-02 | End: 2021-05-05 | Stop reason: HOSPADM

## 2021-05-01 RX ORDER — HEPARIN SODIUM 5000 [USP'U]/ML
5000 INJECTION, SOLUTION INTRAVENOUS; SUBCUTANEOUS EVERY 8 HOURS
Status: DISCONTINUED | OUTPATIENT
Start: 2021-05-02 | End: 2021-05-05 | Stop reason: HOSPADM

## 2021-05-01 RX ORDER — ONDANSETRON 2 MG/ML
4 INJECTION INTRAMUSCULAR; INTRAVENOUS EVERY 12 HOURS PRN
Status: DISCONTINUED | OUTPATIENT
Start: 2021-05-02 | End: 2021-05-04 | Stop reason: SDUPTHER

## 2021-05-01 RX ORDER — CLOPIDOGREL BISULFATE 75 MG/1
75 TABLET ORAL DAILY
Status: DISCONTINUED | OUTPATIENT
Start: 2021-05-02 | End: 2021-05-05 | Stop reason: HOSPADM

## 2021-05-01 RX ORDER — LABETALOL HCL 20 MG/4 ML
10 SYRINGE (ML) INTRAVENOUS EVERY 6 HOURS PRN
Status: DISCONTINUED | OUTPATIENT
Start: 2021-05-02 | End: 2021-05-05 | Stop reason: HOSPADM

## 2021-05-01 RX ORDER — NIFEDIPINE 30 MG/1
90 TABLET, EXTENDED RELEASE ORAL DAILY
Status: DISCONTINUED | OUTPATIENT
Start: 2021-05-02 | End: 2021-05-02

## 2021-05-01 RX ORDER — CLOPIDOGREL 300 MG/1
300 TABLET, FILM COATED ORAL ONCE
Status: COMPLETED | OUTPATIENT
Start: 2021-05-01 | End: 2021-05-01

## 2021-05-01 RX ADMIN — CLOPIDOGREL BISULFATE 300 MG: 300 TABLET, FILM COATED ORAL at 11:05

## 2021-05-02 LAB
ALBUMIN SERPL BCP-MCNC: 2.9 G/DL (ref 3.5–5.2)
ALP SERPL-CCNC: 122 U/L (ref 55–135)
ALT SERPL W/O P-5'-P-CCNC: 30 U/L (ref 10–44)
ANION GAP SERPL CALC-SCNC: 10 MMOL/L (ref 8–16)
APTT BLDCRRT: 33.1 SEC (ref 21–32)
AST SERPL-CCNC: 32 U/L (ref 10–40)
BASOPHILS # BLD AUTO: 0.05 K/UL (ref 0–0.2)
BASOPHILS NFR BLD: 0.7 % (ref 0–1.9)
BILIRUB SERPL-MCNC: 1.5 MG/DL (ref 0.1–1)
BUN SERPL-MCNC: 46 MG/DL (ref 6–20)
CALCIUM SERPL-MCNC: 9.6 MG/DL (ref 8.7–10.5)
CHLORIDE SERPL-SCNC: 100 MMOL/L (ref 95–110)
CK MB SERPL-MCNC: 0.9 NG/ML (ref 0.1–6.5)
CK MB SERPL-RTO: 2.2 % (ref 0–5)
CK SERPL-CCNC: 41 U/L (ref 20–200)
CO2 SERPL-SCNC: 23 MMOL/L (ref 23–29)
CREAT SERPL-MCNC: 1.9 MG/DL (ref 0.5–1.4)
CREAT UR-MCNC: 46 MG/DL (ref 23–375)
DIFFERENTIAL METHOD: ABNORMAL
EOSINOPHIL # BLD AUTO: 0.2 K/UL (ref 0–0.5)
EOSINOPHIL NFR BLD: 2.9 % (ref 0–8)
ERYTHROCYTE [DISTWIDTH] IN BLOOD BY AUTOMATED COUNT: 17.1 % (ref 11.5–14.5)
EST. GFR  (AFRICAN AMERICAN): 43.3 ML/MIN/1.73 M^2
EST. GFR  (NON AFRICAN AMERICAN): 37.5 ML/MIN/1.73 M^2
GLUCOSE SERPL-MCNC: 104 MG/DL (ref 70–110)
HCT VFR BLD AUTO: 21 % (ref 40–54)
HCT VFR BLD AUTO: 23.4 % (ref 40–54)
HGB BLD-MCNC: 6.3 G/DL (ref 14–18)
HGB BLD-MCNC: 7.2 G/DL (ref 14–18)
IMM GRANULOCYTES # BLD AUTO: 0.21 K/UL (ref 0–0.04)
IMM GRANULOCYTES NFR BLD AUTO: 2.8 % (ref 0–0.5)
INR PPP: 1 (ref 0.8–1.2)
LYMPHOCYTES # BLD AUTO: 1.2 K/UL (ref 1–4.8)
LYMPHOCYTES NFR BLD: 15.7 % (ref 18–48)
MAGNESIUM SERPL-MCNC: 1.9 MG/DL (ref 1.6–2.6)
MCH RBC QN AUTO: 28.6 PG (ref 27–31)
MCHC RBC AUTO-ENTMCNC: 30 G/DL (ref 32–36)
MCV RBC AUTO: 96 FL (ref 82–98)
MONOCYTES # BLD AUTO: 0.9 K/UL (ref 0.3–1)
MONOCYTES NFR BLD: 12.5 % (ref 4–15)
NEUTROPHILS # BLD AUTO: 4.9 K/UL (ref 1.8–7.7)
NEUTROPHILS NFR BLD: 65.4 % (ref 38–73)
NRBC BLD-RTO: 1 /100 WBC
PHOSPHATE SERPL-MCNC: 3.6 MG/DL (ref 2.7–4.5)
PLATELET # BLD AUTO: 622 K/UL (ref 150–450)
PMV BLD AUTO: 9.9 FL (ref 9.2–12.9)
POTASSIUM SERPL-SCNC: 4.2 MMOL/L (ref 3.5–5.1)
PROT SERPL-MCNC: 8 G/DL (ref 6–8.4)
PROTHROMBIN TIME: 11 SEC (ref 9–12.5)
RBC # BLD AUTO: 2.2 M/UL (ref 4.6–6.2)
SODIUM SERPL-SCNC: 133 MMOL/L (ref 136–145)
SODIUM UR-SCNC: 91 MMOL/L (ref 20–250)
TROPONIN I SERPL DL<=0.01 NG/ML-MCNC: 0.02 NG/ML (ref 0–0.03)
UUN UR-MCNC: 461 MG/DL (ref 140–1050)
WBC # BLD AUTO: 7.53 K/UL (ref 3.9–12.7)

## 2021-05-02 PROCEDURE — 36415 COLL VENOUS BLD VENIPUNCTURE: CPT | Performed by: STUDENT IN AN ORGANIZED HEALTH CARE EDUCATION/TRAINING PROGRAM

## 2021-05-02 PROCEDURE — 83735 ASSAY OF MAGNESIUM: CPT | Performed by: NURSE PRACTITIONER

## 2021-05-02 PROCEDURE — 85730 THROMBOPLASTIN TIME PARTIAL: CPT | Performed by: PSYCHIATRY & NEUROLOGY

## 2021-05-02 PROCEDURE — 84484 ASSAY OF TROPONIN QUANT: CPT | Performed by: NURSE PRACTITIONER

## 2021-05-02 PROCEDURE — 85014 HEMATOCRIT: CPT | Performed by: STUDENT IN AN ORGANIZED HEALTH CARE EDUCATION/TRAINING PROGRAM

## 2021-05-02 PROCEDURE — 36415 COLL VENOUS BLD VENIPUNCTURE: CPT | Performed by: NURSE PRACTITIONER

## 2021-05-02 PROCEDURE — 25000003 PHARM REV CODE 250: Performed by: STUDENT IN AN ORGANIZED HEALTH CARE EDUCATION/TRAINING PROGRAM

## 2021-05-02 PROCEDURE — 63600175 PHARM REV CODE 636 W HCPCS: Performed by: NURSE PRACTITIONER

## 2021-05-02 PROCEDURE — 84300 ASSAY OF URINE SODIUM: CPT | Performed by: STUDENT IN AN ORGANIZED HEALTH CARE EDUCATION/TRAINING PROGRAM

## 2021-05-02 PROCEDURE — 25500020 PHARM REV CODE 255: Performed by: PHYSICIAN ASSISTANT

## 2021-05-02 PROCEDURE — 11000001 HC ACUTE MED/SURG PRIVATE ROOM

## 2021-05-02 PROCEDURE — 25000003 PHARM REV CODE 250: Performed by: NURSE PRACTITIONER

## 2021-05-02 PROCEDURE — 82550 ASSAY OF CK (CPK): CPT | Performed by: NURSE PRACTITIONER

## 2021-05-02 PROCEDURE — 84100 ASSAY OF PHOSPHORUS: CPT | Performed by: NURSE PRACTITIONER

## 2021-05-02 PROCEDURE — 82570 ASSAY OF URINE CREATININE: CPT | Performed by: STUDENT IN AN ORGANIZED HEALTH CARE EDUCATION/TRAINING PROGRAM

## 2021-05-02 PROCEDURE — 97166 OT EVAL MOD COMPLEX 45 MIN: CPT

## 2021-05-02 PROCEDURE — 85610 PROTHROMBIN TIME: CPT | Performed by: PSYCHIATRY & NEUROLOGY

## 2021-05-02 PROCEDURE — 97530 THERAPEUTIC ACTIVITIES: CPT

## 2021-05-02 PROCEDURE — 85025 COMPLETE CBC W/AUTO DIFF WBC: CPT | Performed by: NURSE PRACTITIONER

## 2021-05-02 PROCEDURE — 80053 COMPREHEN METABOLIC PANEL: CPT | Performed by: NURSE PRACTITIONER

## 2021-05-02 PROCEDURE — 84540 ASSAY OF URINE/UREA-N: CPT | Performed by: STUDENT IN AN ORGANIZED HEALTH CARE EDUCATION/TRAINING PROGRAM

## 2021-05-02 PROCEDURE — 63600175 PHARM REV CODE 636 W HCPCS: Performed by: STUDENT IN AN ORGANIZED HEALTH CARE EDUCATION/TRAINING PROGRAM

## 2021-05-02 PROCEDURE — 85018 HEMOGLOBIN: CPT | Performed by: STUDENT IN AN ORGANIZED HEALTH CARE EDUCATION/TRAINING PROGRAM

## 2021-05-02 RX ORDER — AMOXICILLIN 250 MG
1 CAPSULE ORAL DAILY
Status: DISCONTINUED | OUTPATIENT
Start: 2021-05-02 | End: 2021-05-05 | Stop reason: HOSPADM

## 2021-05-02 RX ORDER — POLYETHYLENE GLYCOL 3350 17 G/17G
17 POWDER, FOR SOLUTION ORAL DAILY
Status: DISCONTINUED | OUTPATIENT
Start: 2021-05-02 | End: 2021-05-05 | Stop reason: HOSPADM

## 2021-05-02 RX ADMIN — HEPARIN SODIUM 5000 UNITS: 5000 INJECTION INTRAVENOUS; SUBCUTANEOUS at 02:05

## 2021-05-02 RX ADMIN — POLYETHYLENE GLYCOL 3350 17 G: 17 POWDER, FOR SOLUTION ORAL at 11:05

## 2021-05-02 RX ADMIN — SODIUM CHLORIDE, SODIUM LACTATE, POTASSIUM CHLORIDE, AND CALCIUM CHLORIDE 1000 ML: .6; .31; .03; .02 INJECTION, SOLUTION INTRAVENOUS at 08:05

## 2021-05-02 RX ADMIN — DOCUSATE SODIUM 50MG AND SENNOSIDES 8.6MG 1 TABLET: 8.6; 5 TABLET, FILM COATED ORAL at 11:05

## 2021-05-02 RX ADMIN — ASPIRIN 81 MG: 81 TABLET, COATED ORAL at 08:05

## 2021-05-02 RX ADMIN — HEPARIN SODIUM 5000 UNITS: 5000 INJECTION INTRAVENOUS; SUBCUTANEOUS at 07:05

## 2021-05-02 RX ADMIN — FOLIC ACID 1 MG: 1 TABLET ORAL at 08:05

## 2021-05-02 RX ADMIN — HEPARIN SODIUM 5000 UNITS: 5000 INJECTION INTRAVENOUS; SUBCUTANEOUS at 06:05

## 2021-05-02 RX ADMIN — PRAVASTATIN SODIUM 40 MG: 40 TABLET ORAL at 07:05

## 2021-05-02 RX ADMIN — CLOPIDOGREL 75 MG: 75 TABLET, FILM COATED ORAL at 08:05

## 2021-05-02 RX ADMIN — FERROUS SULFATE TAB EC 325 MG (65 MG FE EQUIVALENT) 325 MG: 325 (65 FE) TABLET DELAYED RESPONSE at 08:05

## 2021-05-02 RX ADMIN — DAPSONE 100 MG: 100 TABLET ORAL at 08:05

## 2021-05-02 RX ADMIN — IOHEXOL 75 ML: 350 INJECTION, SOLUTION INTRAVENOUS at 12:05

## 2021-05-03 PROBLEM — D64.9 ANEMIA: Status: ACTIVE | Noted: 2021-05-03

## 2021-05-03 PROBLEM — G93.6 CYTOTOXIC CEREBRAL EDEMA: Status: ACTIVE | Noted: 2021-05-03

## 2021-05-03 PROBLEM — E87.1 HYPONATREMIA: Status: ACTIVE | Noted: 2021-05-03

## 2021-05-03 PROBLEM — I63.312 CEREBROVASCULAR ACCIDENT (CVA) DUE TO THROMBOSIS OF LEFT MIDDLE CEREBRAL ARTERY: Status: ACTIVE | Noted: 2021-05-01

## 2021-05-03 PROBLEM — I63.9 ACUTE ISCHEMIC STROKE: Status: RESOLVED | Noted: 2021-05-01 | Resolved: 2021-05-03

## 2021-05-03 LAB
ABO + RH BLD: NORMAL
ALBUMIN SERPL BCP-MCNC: 2.9 G/DL (ref 3.5–5.2)
ALP SERPL-CCNC: 117 U/L (ref 55–135)
ALT SERPL W/O P-5'-P-CCNC: 35 U/L (ref 10–44)
ANION GAP SERPL CALC-SCNC: 7 MMOL/L (ref 8–16)
AST SERPL-CCNC: 35 U/L (ref 10–40)
BASOPHILS # BLD AUTO: 0.05 K/UL (ref 0–0.2)
BASOPHILS # BLD AUTO: 0.05 K/UL (ref 0–0.2)
BASOPHILS # BLD AUTO: 0.06 K/UL (ref 0–0.2)
BASOPHILS # BLD AUTO: 0.06 K/UL (ref 0–0.2)
BASOPHILS NFR BLD: 0.6 % (ref 0–1.9)
BASOPHILS NFR BLD: 0.7 % (ref 0–1.9)
BASOPHILS NFR BLD: 0.7 % (ref 0–1.9)
BASOPHILS NFR BLD: 0.8 % (ref 0–1.9)
BILIRUB SERPL-MCNC: 1.5 MG/DL (ref 0.1–1)
BLD GP AB SCN CELLS X3 SERPL QL: NORMAL
BLD PROD TYP BPU: NORMAL
BLOOD UNIT EXPIRATION DATE: NORMAL
BLOOD UNIT TYPE CODE: 5100
BLOOD UNIT TYPE: NORMAL
BUN SERPL-MCNC: 42 MG/DL (ref 6–20)
CALCIUM SERPL-MCNC: 9.5 MG/DL (ref 8.7–10.5)
CHLORIDE SERPL-SCNC: 102 MMOL/L (ref 95–110)
CO2 SERPL-SCNC: 26 MMOL/L (ref 23–29)
CODING SYSTEM: NORMAL
CREAT SERPL-MCNC: 1.8 MG/DL (ref 0.5–1.4)
CRP SERPL-MCNC: 54.9 MG/L (ref 0–8.2)
DIFFERENTIAL METHOD: ABNORMAL
DISPENSE STATUS: NORMAL
EOSINOPHIL # BLD AUTO: 0.2 K/UL (ref 0–0.5)
EOSINOPHIL # BLD AUTO: 0.2 K/UL (ref 0–0.5)
EOSINOPHIL # BLD AUTO: 0.3 K/UL (ref 0–0.5)
EOSINOPHIL # BLD AUTO: 0.3 K/UL (ref 0–0.5)
EOSINOPHIL NFR BLD: 2.3 % (ref 0–8)
EOSINOPHIL NFR BLD: 2.9 % (ref 0–8)
EOSINOPHIL NFR BLD: 3.4 % (ref 0–8)
EOSINOPHIL NFR BLD: 3.9 % (ref 0–8)
ERYTHROCYTE [DISTWIDTH] IN BLOOD BY AUTOMATED COUNT: 17.2 % (ref 11.5–14.5)
ERYTHROCYTE [DISTWIDTH] IN BLOOD BY AUTOMATED COUNT: 17.3 % (ref 11.5–14.5)
ERYTHROCYTE [DISTWIDTH] IN BLOOD BY AUTOMATED COUNT: 17.4 % (ref 11.5–14.5)
ERYTHROCYTE [DISTWIDTH] IN BLOOD BY AUTOMATED COUNT: 17.5 % (ref 11.5–14.5)
ERYTHROCYTE [SEDIMENTATION RATE] IN BLOOD BY WESTERGREN METHOD: 95 MM/HR (ref 0–23)
EST. GFR  (AFRICAN AMERICAN): 46.3 ML/MIN/1.73 M^2
EST. GFR  (NON AFRICAN AMERICAN): 40 ML/MIN/1.73 M^2
FERRITIN SERPL-MCNC: 2258 NG/ML (ref 20–300)
FOLATE SERPL-MCNC: 36.3 NG/ML (ref 4–24)
GLUCOSE SERPL-MCNC: 93 MG/DL (ref 70–110)
HCT VFR BLD AUTO: 19.5 % (ref 40–54)
HCT VFR BLD AUTO: 19.9 % (ref 40–54)
HCT VFR BLD AUTO: 21.2 % (ref 40–54)
HCT VFR BLD AUTO: 26.2 % (ref 40–54)
HGB BLD-MCNC: 6.1 G/DL (ref 14–18)
HGB BLD-MCNC: 6.3 G/DL (ref 14–18)
HGB BLD-MCNC: 6.4 G/DL (ref 14–18)
HGB BLD-MCNC: 8.6 G/DL (ref 14–18)
IMM GRANULOCYTES # BLD AUTO: 0.15 K/UL (ref 0–0.04)
IMM GRANULOCYTES # BLD AUTO: 0.19 K/UL (ref 0–0.04)
IMM GRANULOCYTES # BLD AUTO: 0.2 K/UL (ref 0–0.04)
IMM GRANULOCYTES # BLD AUTO: 0.2 K/UL (ref 0–0.04)
IMM GRANULOCYTES NFR BLD AUTO: 1.8 % (ref 0–0.5)
IMM GRANULOCYTES NFR BLD AUTO: 2.2 % (ref 0–0.5)
IMM GRANULOCYTES NFR BLD AUTO: 2.7 % (ref 0–0.5)
IMM GRANULOCYTES NFR BLD AUTO: 2.7 % (ref 0–0.5)
IRON SERPL-MCNC: 48 UG/DL (ref 45–160)
LDH SERPL L TO P-CCNC: 158 U/L (ref 110–260)
LYMPHOCYTES # BLD AUTO: 1.2 K/UL (ref 1–4.8)
LYMPHOCYTES # BLD AUTO: 1.3 K/UL (ref 1–4.8)
LYMPHOCYTES # BLD AUTO: 1.4 K/UL (ref 1–4.8)
LYMPHOCYTES # BLD AUTO: 1.5 K/UL (ref 1–4.8)
LYMPHOCYTES NFR BLD: 14.3 % (ref 18–48)
LYMPHOCYTES NFR BLD: 16.8 % (ref 18–48)
LYMPHOCYTES NFR BLD: 18.3 % (ref 18–48)
LYMPHOCYTES NFR BLD: 19.5 % (ref 18–48)
MCH RBC QN AUTO: 28.4 PG (ref 27–31)
MCH RBC QN AUTO: 28.4 PG (ref 27–31)
MCH RBC QN AUTO: 29.3 PG (ref 27–31)
MCH RBC QN AUTO: 29.4 PG (ref 27–31)
MCHC RBC AUTO-ENTMCNC: 30.2 G/DL (ref 32–36)
MCHC RBC AUTO-ENTMCNC: 31.3 G/DL (ref 32–36)
MCHC RBC AUTO-ENTMCNC: 31.7 G/DL (ref 32–36)
MCHC RBC AUTO-ENTMCNC: 32.8 G/DL (ref 32–36)
MCV RBC AUTO: 89 FL (ref 82–98)
MCV RBC AUTO: 91 FL (ref 82–98)
MCV RBC AUTO: 93 FL (ref 82–98)
MCV RBC AUTO: 94 FL (ref 82–98)
MONOCYTES # BLD AUTO: 1.1 K/UL (ref 0.3–1)
MONOCYTES # BLD AUTO: 1.2 K/UL (ref 0.3–1)
MONOCYTES NFR BLD: 12.6 % (ref 4–15)
MONOCYTES NFR BLD: 13.6 % (ref 4–15)
MONOCYTES NFR BLD: 14.6 % (ref 4–15)
MONOCYTES NFR BLD: 16.5 % (ref 4–15)
NEUTROPHILS # BLD AUTO: 4.2 K/UL (ref 1.8–7.7)
NEUTROPHILS # BLD AUTO: 4.5 K/UL (ref 1.8–7.7)
NEUTROPHILS # BLD AUTO: 5.2 K/UL (ref 1.8–7.7)
NEUTROPHILS # BLD AUTO: 5.9 K/UL (ref 1.8–7.7)
NEUTROPHILS NFR BLD: 56.7 % (ref 38–73)
NEUTROPHILS NFR BLD: 61.7 % (ref 38–73)
NEUTROPHILS NFR BLD: 62.8 % (ref 38–73)
NEUTROPHILS NFR BLD: 67.9 % (ref 38–73)
NRBC BLD-RTO: 1 /100 WBC
NRBC BLD-RTO: 2 /100 WBC
NUM UNITS TRANS PACKED RBC: NORMAL
OSMOLALITY SERPL: 302 MOSM/KG (ref 280–300)
OSMOLALITY UR: 494 MOSM/KG (ref 50–1200)
PATH REV BLD -IMP: NORMAL
PLATELET # BLD AUTO: 427 K/UL (ref 150–450)
PLATELET # BLD AUTO: 474 K/UL (ref 150–450)
PLATELET # BLD AUTO: 533 K/UL (ref 150–450)
PLATELET # BLD AUTO: 575 K/UL (ref 150–450)
PMV BLD AUTO: 10 FL (ref 9.2–12.9)
PMV BLD AUTO: 10 FL (ref 9.2–12.9)
PMV BLD AUTO: 10.1 FL (ref 9.2–12.9)
PMV BLD AUTO: 10.3 FL (ref 9.2–12.9)
POTASSIUM SERPL-SCNC: 4.6 MMOL/L (ref 3.5–5.1)
PROT SERPL-MCNC: 8 G/DL (ref 6–8.4)
RBC # BLD AUTO: 2.15 M/UL (ref 4.6–6.2)
RBC # BLD AUTO: 2.15 M/UL (ref 4.6–6.2)
RBC # BLD AUTO: 2.25 M/UL (ref 4.6–6.2)
RBC # BLD AUTO: 2.93 M/UL (ref 4.6–6.2)
RETICS/RBC NFR AUTO: 7.2 % (ref 0.4–2)
SATURATED IRON: 18 % (ref 20–50)
SODIUM SERPL-SCNC: 135 MMOL/L (ref 136–145)
SODIUM UR-SCNC: 91 MMOL/L (ref 20–250)
TOTAL IRON BINDING CAPACITY: 268 UG/DL (ref 250–450)
TRANSFERRIN SERPL-MCNC: 181 MG/DL (ref 200–375)
VIT B12 SERPL-MCNC: 473 PG/ML (ref 210–950)
WBC # BLD AUTO: 7.32 K/UL (ref 3.9–12.7)
WBC # BLD AUTO: 7.38 K/UL (ref 3.9–12.7)
WBC # BLD AUTO: 8.26 K/UL (ref 3.9–12.7)
WBC # BLD AUTO: 8.73 K/UL (ref 3.9–12.7)

## 2021-05-03 PROCEDURE — 97535 SELF CARE MNGMENT TRAINING: CPT

## 2021-05-03 PROCEDURE — 63600175 PHARM REV CODE 636 W HCPCS: Performed by: NURSE PRACTITIONER

## 2021-05-03 PROCEDURE — 86920 COMPATIBILITY TEST SPIN: CPT | Performed by: NURSE PRACTITIONER

## 2021-05-03 PROCEDURE — 83540 ASSAY OF IRON: CPT | Performed by: NURSE PRACTITIONER

## 2021-05-03 PROCEDURE — 82746 ASSAY OF FOLIC ACID SERUM: CPT | Performed by: NURSE PRACTITIONER

## 2021-05-03 PROCEDURE — 82728 ASSAY OF FERRITIN: CPT | Performed by: NURSE PRACTITIONER

## 2021-05-03 PROCEDURE — 84300 ASSAY OF URINE SODIUM: CPT | Performed by: NURSE PRACTITIONER

## 2021-05-03 PROCEDURE — 25000003 PHARM REV CODE 250: Performed by: STUDENT IN AN ORGANIZED HEALTH CARE EDUCATION/TRAINING PROGRAM

## 2021-05-03 PROCEDURE — 85060 BLOOD SMEAR INTERPRETATION: CPT | Mod: ,,, | Performed by: PATHOLOGY

## 2021-05-03 PROCEDURE — 85025 COMPLETE CBC W/AUTO DIFF WBC: CPT | Mod: 91 | Performed by: NURSE PRACTITIONER

## 2021-05-03 PROCEDURE — 99232 SBSQ HOSP IP/OBS MODERATE 35: CPT | Mod: ,,, | Performed by: NURSE PRACTITIONER

## 2021-05-03 PROCEDURE — 83935 ASSAY OF URINE OSMOLALITY: CPT | Performed by: NURSE PRACTITIONER

## 2021-05-03 PROCEDURE — 86140 C-REACTIVE PROTEIN: CPT | Performed by: NURSE PRACTITIONER

## 2021-05-03 PROCEDURE — P9016 RBC LEUKOCYTES REDUCED: HCPCS | Performed by: NURSE PRACTITIONER

## 2021-05-03 PROCEDURE — 80053 COMPREHEN METABOLIC PANEL: CPT | Performed by: NURSE PRACTITIONER

## 2021-05-03 PROCEDURE — 25000003 PHARM REV CODE 250: Performed by: NURSE PRACTITIONER

## 2021-05-03 PROCEDURE — 83615 LACTATE (LD) (LDH) ENZYME: CPT | Performed by: NURSE PRACTITIONER

## 2021-05-03 PROCEDURE — 36415 COLL VENOUS BLD VENIPUNCTURE: CPT | Performed by: NURSE PRACTITIONER

## 2021-05-03 PROCEDURE — 85045 AUTOMATED RETICULOCYTE COUNT: CPT | Performed by: NURSE PRACTITIONER

## 2021-05-03 PROCEDURE — 97112 NEUROMUSCULAR REEDUCATION: CPT

## 2021-05-03 PROCEDURE — 92523 SPEECH SOUND LANG COMPREHEN: CPT

## 2021-05-03 PROCEDURE — 99232 PR SUBSEQUENT HOSPITAL CARE,LEVL II: ICD-10-PCS | Mod: ,,, | Performed by: NURSE PRACTITIONER

## 2021-05-03 PROCEDURE — 86900 BLOOD TYPING SEROLOGIC ABO: CPT | Performed by: NURSE PRACTITIONER

## 2021-05-03 PROCEDURE — 85652 RBC SED RATE AUTOMATED: CPT | Performed by: NURSE PRACTITIONER

## 2021-05-03 PROCEDURE — 82607 VITAMIN B-12: CPT | Performed by: NURSE PRACTITIONER

## 2021-05-03 PROCEDURE — 11000001 HC ACUTE MED/SURG PRIVATE ROOM

## 2021-05-03 PROCEDURE — 99233 SBSQ HOSP IP/OBS HIGH 50: CPT | Mod: ,,, | Performed by: PSYCHIATRY & NEUROLOGY

## 2021-05-03 PROCEDURE — 36430 TRANSFUSION BLD/BLD COMPNT: CPT

## 2021-05-03 PROCEDURE — 85060 PATHOLOGIST REVIEW: ICD-10-PCS | Mod: ,,, | Performed by: PATHOLOGY

## 2021-05-03 PROCEDURE — 82272 OCCULT BLD FECES 1-3 TESTS: CPT | Performed by: NURSE PRACTITIONER

## 2021-05-03 PROCEDURE — 92610 EVALUATE SWALLOWING FUNCTION: CPT

## 2021-05-03 PROCEDURE — 99233 PR SUBSEQUENT HOSPITAL CARE,LEVL III: ICD-10-PCS | Mod: ,,, | Performed by: PSYCHIATRY & NEUROLOGY

## 2021-05-03 PROCEDURE — 83930 ASSAY OF BLOOD OSMOLALITY: CPT | Performed by: NURSE PRACTITIONER

## 2021-05-03 PROCEDURE — 97116 GAIT TRAINING THERAPY: CPT

## 2021-05-03 PROCEDURE — 97162 PT EVAL MOD COMPLEX 30 MIN: CPT

## 2021-05-03 RX ORDER — DARUNAVIR 800 MG/1
800 TABLET, FILM COATED ORAL
Status: COMPLETED | OUTPATIENT
Start: 2021-05-03 | End: 2021-05-03

## 2021-05-03 RX ORDER — SODIUM CHLORIDE 9 MG/ML
INJECTION, SOLUTION INTRAVENOUS CONTINUOUS
Status: DISCONTINUED | OUTPATIENT
Start: 2021-05-03 | End: 2021-05-05

## 2021-05-03 RX ORDER — GLYCERIN 1 G/1
1 SUPPOSITORY RECTAL ONCE
Status: COMPLETED | OUTPATIENT
Start: 2021-05-03 | End: 2021-05-03

## 2021-05-03 RX ORDER — ATORVASTATIN CALCIUM 20 MG/1
40 TABLET, FILM COATED ORAL NIGHTLY
Status: DISCONTINUED | OUTPATIENT
Start: 2021-05-03 | End: 2021-05-05 | Stop reason: HOSPADM

## 2021-05-03 RX ORDER — RITONAVIR 100 MG/1
100 TABLET ORAL
Status: COMPLETED | OUTPATIENT
Start: 2021-05-03 | End: 2021-05-03

## 2021-05-03 RX ORDER — HYDROCODONE BITARTRATE AND ACETAMINOPHEN 500; 5 MG/1; MG/1
TABLET ORAL
Status: DISCONTINUED | OUTPATIENT
Start: 2021-05-03 | End: 2021-05-05 | Stop reason: HOSPADM

## 2021-05-03 RX ADMIN — GLYCERIN 1 SUPPOSITORY: 2 SUPPOSITORY RECTAL at 05:05

## 2021-05-03 RX ADMIN — ASPIRIN 81 MG: 81 TABLET, COATED ORAL at 08:05

## 2021-05-03 RX ADMIN — DAPSONE 100 MG: 100 TABLET ORAL at 08:05

## 2021-05-03 RX ADMIN — FERROUS SULFATE TAB EC 325 MG (65 MG FE EQUIVALENT) 325 MG: 325 (65 FE) TABLET DELAYED RESPONSE at 08:05

## 2021-05-03 RX ADMIN — ATORVASTATIN CALCIUM 40 MG: 20 TABLET, FILM COATED ORAL at 08:05

## 2021-05-03 RX ADMIN — HEPARIN SODIUM 5000 UNITS: 5000 INJECTION INTRAVENOUS; SUBCUTANEOUS at 08:05

## 2021-05-03 RX ADMIN — CLOPIDOGREL 75 MG: 75 TABLET, FILM COATED ORAL at 08:05

## 2021-05-03 RX ADMIN — EMTRICITABINE AND TENOFOVIR ALAFENAMIDE 1 TABLET: 200; 25 TABLET ORAL at 04:05

## 2021-05-03 RX ADMIN — HEPARIN SODIUM 5000 UNITS: 5000 INJECTION INTRAVENOUS; SUBCUTANEOUS at 04:05

## 2021-05-03 RX ADMIN — POLYETHYLENE GLYCOL 3350 17 G: 17 POWDER, FOR SOLUTION ORAL at 08:05

## 2021-05-03 RX ADMIN — FOLIC ACID 1 MG: 1 TABLET ORAL at 08:05

## 2021-05-03 RX ADMIN — HEPARIN SODIUM 5000 UNITS: 5000 INJECTION INTRAVENOUS; SUBCUTANEOUS at 06:05

## 2021-05-03 RX ADMIN — SODIUM CHLORIDE: 0.9 INJECTION, SOLUTION INTRAVENOUS at 03:05

## 2021-05-03 RX ADMIN — RITONAVIR 100 MG: 100 TABLET, FILM COATED ORAL at 04:05

## 2021-05-03 RX ADMIN — SODIUM CHLORIDE: 0.9 INJECTION, SOLUTION INTRAVENOUS at 11:05

## 2021-05-03 RX ADMIN — DARUNAVIR 800 MG: 800 TABLET, FILM COATED ORAL at 04:05

## 2021-05-03 RX ADMIN — DOCUSATE SODIUM 50MG AND SENNOSIDES 8.6MG 1 TABLET: 8.6; 5 TABLET, FILM COATED ORAL at 08:05

## 2021-05-04 PROBLEM — I63.311 CEREBROVASCULAR ACCIDENT (CVA) DUE TO THROMBOSIS OF RIGHT MIDDLE CEREBRAL ARTERY: Status: ACTIVE | Noted: 2021-05-01

## 2021-05-04 LAB
ALBUMIN SERPL BCP-MCNC: 2.9 G/DL (ref 3.5–5.2)
ALP SERPL-CCNC: 119 U/L (ref 55–135)
ALT SERPL W/O P-5'-P-CCNC: 32 U/L (ref 10–44)
ANION GAP SERPL CALC-SCNC: 7 MMOL/L (ref 8–16)
AST SERPL-CCNC: 29 U/L (ref 10–40)
BASOPHILS # BLD AUTO: 0.05 K/UL (ref 0–0.2)
BASOPHILS NFR BLD: 0.6 % (ref 0–1.9)
BILIRUB SERPL-MCNC: 1.5 MG/DL (ref 0.1–1)
BUN SERPL-MCNC: 32 MG/DL (ref 6–20)
CALCIUM SERPL-MCNC: 9.4 MG/DL (ref 8.7–10.5)
CHLORIDE SERPL-SCNC: 105 MMOL/L (ref 95–110)
CO2 SERPL-SCNC: 25 MMOL/L (ref 23–29)
CREAT SERPL-MCNC: 1.6 MG/DL (ref 0.5–1.4)
DAT IGG-SP REAG RBC-IMP: NORMAL
DIFFERENTIAL METHOD: ABNORMAL
EOSINOPHIL # BLD AUTO: 0.3 K/UL (ref 0–0.5)
EOSINOPHIL NFR BLD: 3 % (ref 0–8)
ERYTHROCYTE [DISTWIDTH] IN BLOOD BY AUTOMATED COUNT: 17.5 % (ref 11.5–14.5)
EST. GFR  (AFRICAN AMERICAN): 53.3 ML/MIN/1.73 M^2
EST. GFR  (NON AFRICAN AMERICAN): 46.1 ML/MIN/1.73 M^2
GLUCOSE SERPL-MCNC: 101 MG/DL (ref 70–110)
HAPTOGLOB SERPL-MCNC: 171 MG/DL (ref 30–250)
HCT VFR BLD AUTO: 27.2 % (ref 40–54)
HGB BLD-MCNC: 8.7 G/DL (ref 14–18)
IMM GRANULOCYTES # BLD AUTO: 0.16 K/UL (ref 0–0.04)
IMM GRANULOCYTES NFR BLD AUTO: 1.9 % (ref 0–0.5)
LYMPHOCYTES # BLD AUTO: 1.3 K/UL (ref 1–4.8)
LYMPHOCYTES NFR BLD: 15.4 % (ref 18–48)
MAGNESIUM SERPL-MCNC: 1.9 MG/DL (ref 1.6–2.6)
MCH RBC QN AUTO: 29.3 PG (ref 27–31)
MCHC RBC AUTO-ENTMCNC: 32 G/DL (ref 32–36)
MCV RBC AUTO: 92 FL (ref 82–98)
MONOCYTES # BLD AUTO: 1 K/UL (ref 0.3–1)
MONOCYTES NFR BLD: 12.2 % (ref 4–15)
NEUTROPHILS # BLD AUTO: 5.6 K/UL (ref 1.8–7.7)
NEUTROPHILS NFR BLD: 66.9 % (ref 38–73)
NRBC BLD-RTO: 1 /100 WBC
OB PNL STL: NEGATIVE
PATH REV BLD -IMP: NORMAL
PLATELET # BLD AUTO: 399 K/UL (ref 150–450)
PMV BLD AUTO: 10.2 FL (ref 9.2–12.9)
POTASSIUM SERPL-SCNC: 4.5 MMOL/L (ref 3.5–5.1)
PROT SERPL-MCNC: 8 G/DL (ref 6–8.4)
RBC # BLD AUTO: 2.97 M/UL (ref 4.6–6.2)
SODIUM SERPL-SCNC: 137 MMOL/L (ref 136–145)
WBC # BLD AUTO: 8.36 K/UL (ref 3.9–12.7)

## 2021-05-04 PROCEDURE — 99223 1ST HOSP IP/OBS HIGH 75: CPT | Mod: ,,, | Performed by: INTERNAL MEDICINE

## 2021-05-04 PROCEDURE — 94761 N-INVAS EAR/PLS OXIMETRY MLT: CPT

## 2021-05-04 PROCEDURE — 36415 COLL VENOUS BLD VENIPUNCTURE: CPT | Performed by: NURSE PRACTITIONER

## 2021-05-04 PROCEDURE — 83010 ASSAY OF HAPTOGLOBIN QUANT: CPT | Performed by: STUDENT IN AN ORGANIZED HEALTH CARE EDUCATION/TRAINING PROGRAM

## 2021-05-04 PROCEDURE — 86335 IMMUNFIX E-PHORSIS/URINE/CSF: CPT | Mod: 26,,, | Performed by: PATHOLOGY

## 2021-05-04 PROCEDURE — 86880 COOMBS TEST DIRECT: CPT | Performed by: STUDENT IN AN ORGANIZED HEALTH CARE EDUCATION/TRAINING PROGRAM

## 2021-05-04 PROCEDURE — 83735 ASSAY OF MAGNESIUM: CPT | Performed by: NURSE PRACTITIONER

## 2021-05-04 PROCEDURE — 86335 IMMUNFIX E-PHORSIS/URINE/CSF: CPT | Performed by: STUDENT IN AN ORGANIZED HEALTH CARE EDUCATION/TRAINING PROGRAM

## 2021-05-04 PROCEDURE — 86335 PATHOLOGIST INTERPRETATION UIFE: ICD-10-PCS | Mod: 26,,, | Performed by: PATHOLOGY

## 2021-05-04 PROCEDURE — 99233 PR SUBSEQUENT HOSPITAL CARE,LEVL III: ICD-10-PCS | Mod: ,,, | Performed by: PSYCHIATRY & NEUROLOGY

## 2021-05-04 PROCEDURE — 25000003 PHARM REV CODE 250: Performed by: STUDENT IN AN ORGANIZED HEALTH CARE EDUCATION/TRAINING PROGRAM

## 2021-05-04 PROCEDURE — 86747 PARVOVIRUS ANTIBODY: CPT | Mod: 91 | Performed by: STUDENT IN AN ORGANIZED HEALTH CARE EDUCATION/TRAINING PROGRAM

## 2021-05-04 PROCEDURE — 86334 IMMUNOFIX E-PHORESIS SERUM: CPT | Performed by: STUDENT IN AN ORGANIZED HEALTH CARE EDUCATION/TRAINING PROGRAM

## 2021-05-04 PROCEDURE — 84165 PROTEIN E-PHORESIS SERUM: CPT | Mod: 26,,, | Performed by: PATHOLOGY

## 2021-05-04 PROCEDURE — 84165 PROTEIN E-PHORESIS SERUM: CPT | Performed by: STUDENT IN AN ORGANIZED HEALTH CARE EDUCATION/TRAINING PROGRAM

## 2021-05-04 PROCEDURE — 11000001 HC ACUTE MED/SURG PRIVATE ROOM

## 2021-05-04 PROCEDURE — 84156 ASSAY OF PROTEIN URINE: CPT | Performed by: STUDENT IN AN ORGANIZED HEALTH CARE EDUCATION/TRAINING PROGRAM

## 2021-05-04 PROCEDURE — 36415 COLL VENOUS BLD VENIPUNCTURE: CPT | Performed by: STUDENT IN AN ORGANIZED HEALTH CARE EDUCATION/TRAINING PROGRAM

## 2021-05-04 PROCEDURE — 84166 PROTEIN E-PHORESIS/URINE/CSF: CPT | Mod: 26,,, | Performed by: PATHOLOGY

## 2021-05-04 PROCEDURE — 84166 PATHOLOGIST INTERPRETATION UPE: ICD-10-PCS | Mod: 26,,, | Performed by: PATHOLOGY

## 2021-05-04 PROCEDURE — 86334 PATHOLOGIST INTERPRETATION IFE: ICD-10-PCS | Mod: 26,,, | Performed by: PATHOLOGY

## 2021-05-04 PROCEDURE — 63600175 PHARM REV CODE 636 W HCPCS: Performed by: NURSE PRACTITIONER

## 2021-05-04 PROCEDURE — 86334 IMMUNOFIX E-PHORESIS SERUM: CPT | Mod: 26,,, | Performed by: PATHOLOGY

## 2021-05-04 PROCEDURE — 85025 COMPLETE CBC W/AUTO DIFF WBC: CPT | Performed by: NURSE PRACTITIONER

## 2021-05-04 PROCEDURE — 80053 COMPREHEN METABOLIC PANEL: CPT | Performed by: NURSE PRACTITIONER

## 2021-05-04 PROCEDURE — 99223 PR INITIAL HOSPITAL CARE,LEVL III: ICD-10-PCS | Mod: ,,, | Performed by: INTERNAL MEDICINE

## 2021-05-04 PROCEDURE — 84166 PROTEIN E-PHORESIS/URINE/CSF: CPT | Performed by: STUDENT IN AN ORGANIZED HEALTH CARE EDUCATION/TRAINING PROGRAM

## 2021-05-04 PROCEDURE — 99233 SBSQ HOSP IP/OBS HIGH 50: CPT | Mod: ,,, | Performed by: PSYCHIATRY & NEUROLOGY

## 2021-05-04 PROCEDURE — 25000003 PHARM REV CODE 250: Performed by: NURSE PRACTITIONER

## 2021-05-04 PROCEDURE — 84165 PATHOLOGIST INTERPRETATION SPE: ICD-10-PCS | Mod: 26,,, | Performed by: PATHOLOGY

## 2021-05-04 RX ORDER — CLOPIDOGREL BISULFATE 75 MG/1
75 TABLET ORAL DAILY
Status: DISCONTINUED | OUTPATIENT
Start: 2021-05-05 | End: 2021-05-04 | Stop reason: SDUPTHER

## 2021-05-04 RX ORDER — ATORVASTATIN CALCIUM 20 MG/1
80 TABLET, FILM COATED ORAL DAILY
Status: DISCONTINUED | OUTPATIENT
Start: 2021-05-05 | End: 2021-05-04

## 2021-05-04 RX ORDER — ONDANSETRON 2 MG/ML
4 INJECTION INTRAMUSCULAR; INTRAVENOUS EVERY 12 HOURS PRN
Status: DISCONTINUED | OUTPATIENT
Start: 2021-05-04 | End: 2021-05-05 | Stop reason: HOSPADM

## 2021-05-04 RX ORDER — DAPSONE 100 MG/1
100 TABLET ORAL DAILY
Status: DISCONTINUED | OUTPATIENT
Start: 2021-05-05 | End: 2021-05-04

## 2021-05-04 RX ORDER — NIFEDIPINE 30 MG/1
90 TABLET, EXTENDED RELEASE ORAL DAILY
Status: DISCONTINUED | OUTPATIENT
Start: 2021-05-05 | End: 2021-05-05

## 2021-05-04 RX ORDER — ATOVAQUONE 750 MG/5ML
1500 SUSPENSION ORAL DAILY
Status: DISCONTINUED | OUTPATIENT
Start: 2021-05-05 | End: 2021-05-05 | Stop reason: HOSPADM

## 2021-05-04 RX ORDER — SODIUM CHLORIDE 0.9 % (FLUSH) 0.9 %
10 SYRINGE (ML) INJECTION
Status: DISCONTINUED | OUTPATIENT
Start: 2021-05-04 | End: 2021-05-05 | Stop reason: HOSPADM

## 2021-05-04 RX ORDER — DOXYCYCLINE HYCLATE 100 MG
100 TABLET ORAL EVERY 12 HOURS
Status: DISCONTINUED | OUTPATIENT
Start: 2021-05-05 | End: 2021-05-05

## 2021-05-04 RX ORDER — ASPIRIN 81 MG/1
81 TABLET ORAL DAILY
Status: DISCONTINUED | OUTPATIENT
Start: 2021-05-05 | End: 2021-05-05

## 2021-05-04 RX ORDER — LISINOPRIL 5 MG/1
10 TABLET ORAL DAILY
Status: DISCONTINUED | OUTPATIENT
Start: 2021-05-05 | End: 2021-05-05 | Stop reason: HOSPADM

## 2021-05-04 RX ORDER — ACETAMINOPHEN 325 MG/1
650 TABLET ORAL EVERY 6 HOURS PRN
Status: DISCONTINUED | OUTPATIENT
Start: 2021-05-04 | End: 2021-05-05 | Stop reason: HOSPADM

## 2021-05-04 RX ORDER — HYDROCHLOROTHIAZIDE 25 MG/1
25 TABLET ORAL DAILY
Status: DISCONTINUED | OUTPATIENT
Start: 2021-05-05 | End: 2021-05-05 | Stop reason: HOSPADM

## 2021-05-04 RX ORDER — ENOXAPARIN SODIUM 100 MG/ML
40 INJECTION SUBCUTANEOUS EVERY 24 HOURS
Status: DISCONTINUED | OUTPATIENT
Start: 2021-05-04 | End: 2021-05-04

## 2021-05-04 RX ORDER — IBUPROFEN 200 MG
1 TABLET ORAL DAILY
Status: DISCONTINUED | OUTPATIENT
Start: 2021-05-05 | End: 2021-05-05 | Stop reason: HOSPADM

## 2021-05-04 RX ORDER — METOPROLOL SUCCINATE 50 MG/1
50 TABLET, EXTENDED RELEASE ORAL DAILY
Status: DISCONTINUED | OUTPATIENT
Start: 2021-05-05 | End: 2021-05-05

## 2021-05-04 RX ADMIN — HEPARIN SODIUM 5000 UNITS: 5000 INJECTION INTRAVENOUS; SUBCUTANEOUS at 05:05

## 2021-05-04 RX ADMIN — DAPSONE 100 MG: 100 TABLET ORAL at 08:05

## 2021-05-04 RX ADMIN — SODIUM CHLORIDE: 0.9 INJECTION, SOLUTION INTRAVENOUS at 02:05

## 2021-05-04 RX ADMIN — CLOPIDOGREL 75 MG: 75 TABLET, FILM COATED ORAL at 08:05

## 2021-05-04 RX ADMIN — HEPARIN SODIUM 5000 UNITS: 5000 INJECTION INTRAVENOUS; SUBCUTANEOUS at 02:05

## 2021-05-04 RX ADMIN — FERROUS SULFATE TAB EC 325 MG (65 MG FE EQUIVALENT) 325 MG: 325 (65 FE) TABLET DELAYED RESPONSE at 08:05

## 2021-05-04 RX ADMIN — ATORVASTATIN CALCIUM 40 MG: 20 TABLET, FILM COATED ORAL at 09:05

## 2021-05-04 RX ADMIN — DOCUSATE SODIUM 50MG AND SENNOSIDES 8.6MG 1 TABLET: 8.6; 5 TABLET, FILM COATED ORAL at 08:05

## 2021-05-04 RX ADMIN — SODIUM CHLORIDE: 0.9 INJECTION, SOLUTION INTRAVENOUS at 08:05

## 2021-05-04 RX ADMIN — FOLIC ACID 1 MG: 1 TABLET ORAL at 08:05

## 2021-05-04 RX ADMIN — POLYETHYLENE GLYCOL 3350 17 G: 17 POWDER, FOR SOLUTION ORAL at 08:05

## 2021-05-04 RX ADMIN — HEPARIN SODIUM 5000 UNITS: 5000 INJECTION INTRAVENOUS; SUBCUTANEOUS at 10:05

## 2021-05-05 VITALS
WEIGHT: 155 LBS | RESPIRATION RATE: 18 BRPM | SYSTOLIC BLOOD PRESSURE: 180 MMHG | OXYGEN SATURATION: 96 % | HEIGHT: 69 IN | HEART RATE: 60 BPM | BODY MASS INDEX: 22.96 KG/M2 | DIASTOLIC BLOOD PRESSURE: 80 MMHG | TEMPERATURE: 99 F

## 2021-05-05 LAB
ALBUMIN SERPL BCP-MCNC: 2.7 G/DL (ref 3.5–5.2)
ALBUMIN SERPL ELPH-MCNC: 3.09 G/DL (ref 3.35–5.55)
ALP SERPL-CCNC: 114 U/L (ref 55–135)
ALPHA1 GLOB SERPL ELPH-MCNC: 0.49 G/DL (ref 0.17–0.41)
ALPHA2 GLOB SERPL ELPH-MCNC: 0.88 G/DL (ref 0.43–0.99)
ALT SERPL W/O P-5'-P-CCNC: 29 U/L (ref 10–44)
ANION GAP SERPL CALC-SCNC: 11 MMOL/L (ref 8–16)
AST SERPL-CCNC: 30 U/L (ref 10–40)
B-GLOBULIN SERPL ELPH-MCNC: 1.16 G/DL (ref 0.5–1.1)
BASOPHILS # BLD AUTO: 0.05 K/UL (ref 0–0.2)
BASOPHILS NFR BLD: 0.8 % (ref 0–1.9)
BILIRUB SERPL-MCNC: 1.3 MG/DL (ref 0.1–1)
BUN SERPL-MCNC: 27 MG/DL (ref 6–20)
CALCIUM SERPL-MCNC: 9.3 MG/DL (ref 8.7–10.5)
CHLORIDE SERPL-SCNC: 106 MMOL/L (ref 95–110)
CO2 SERPL-SCNC: 19 MMOL/L (ref 23–29)
CREAT SERPL-MCNC: 1.1 MG/DL (ref 0.5–1.4)
DIFFERENTIAL METHOD: ABNORMAL
EOSINOPHIL # BLD AUTO: 0.3 K/UL (ref 0–0.5)
EOSINOPHIL NFR BLD: 3.8 % (ref 0–8)
ERYTHROCYTE [DISTWIDTH] IN BLOOD BY AUTOMATED COUNT: 17.6 % (ref 11.5–14.5)
EST. GFR  (AFRICAN AMERICAN): >60 ML/MIN/1.73 M^2
EST. GFR  (NON AFRICAN AMERICAN): >60 ML/MIN/1.73 M^2
GAMMA GLOB SERPL ELPH-MCNC: 1.89 G/DL (ref 0.67–1.58)
GLUCOSE SERPL-MCNC: 76 MG/DL (ref 70–110)
HCT VFR BLD AUTO: 25.1 % (ref 40–54)
HGB BLD-MCNC: 8 G/DL (ref 14–18)
IMM GRANULOCYTES # BLD AUTO: 0.15 K/UL (ref 0–0.04)
IMM GRANULOCYTES NFR BLD AUTO: 2.3 % (ref 0–0.5)
INTERPRETATION SERPL IFE-IMP: NORMAL
INTERPRETATION UR IFE-IMP: NORMAL
LYMPHOCYTES # BLD AUTO: 1.2 K/UL (ref 1–4.8)
LYMPHOCYTES NFR BLD: 17.8 % (ref 18–48)
MAGNESIUM SERPL-MCNC: 1.7 MG/DL (ref 1.6–2.6)
MCH RBC QN AUTO: 29.3 PG (ref 27–31)
MCHC RBC AUTO-ENTMCNC: 31.9 G/DL (ref 32–36)
MCV RBC AUTO: 92 FL (ref 82–98)
MONOCYTES # BLD AUTO: 0.9 K/UL (ref 0.3–1)
MONOCYTES NFR BLD: 12.8 % (ref 4–15)
NEUTROPHILS # BLD AUTO: 4.1 K/UL (ref 1.8–7.7)
NEUTROPHILS NFR BLD: 62.5 % (ref 38–73)
NRBC BLD-RTO: 0 /100 WBC
PHOSPHATE SERPL-MCNC: 3.3 MG/DL (ref 2.7–4.5)
PLATELET # BLD AUTO: 379 K/UL (ref 150–450)
PMV BLD AUTO: 10.6 FL (ref 9.2–12.9)
POTASSIUM SERPL-SCNC: 4.3 MMOL/L (ref 3.5–5.1)
PROT SERPL-MCNC: 7.5 G/DL (ref 6–8.4)
PROT SERPL-MCNC: 7.8 G/DL (ref 6–8.4)
PROT UR-MCNC: 21 MG/DL (ref 0–15)
RBC # BLD AUTO: 2.73 M/UL (ref 4.6–6.2)
SODIUM SERPL-SCNC: 136 MMOL/L (ref 136–145)
WBC # BLD AUTO: 6.62 K/UL (ref 3.9–12.7)

## 2021-05-05 PROCEDURE — 99233 SBSQ HOSP IP/OBS HIGH 50: CPT | Mod: ,,, | Performed by: PSYCHIATRY & NEUROLOGY

## 2021-05-05 PROCEDURE — 86777 TOXOPLASMA ANTIBODY: CPT | Performed by: STUDENT IN AN ORGANIZED HEALTH CARE EDUCATION/TRAINING PROGRAM

## 2021-05-05 PROCEDURE — 87906 NFCT AGT GNTYP ALYS HIV1: CPT | Performed by: STUDENT IN AN ORGANIZED HEALTH CARE EDUCATION/TRAINING PROGRAM

## 2021-05-05 PROCEDURE — 84100 ASSAY OF PHOSPHORUS: CPT | Performed by: NURSE PRACTITIONER

## 2021-05-05 PROCEDURE — 85025 COMPLETE CBC W/AUTO DIFF WBC: CPT | Performed by: NURSE PRACTITIONER

## 2021-05-05 PROCEDURE — 80053 COMPREHEN METABOLIC PANEL: CPT | Performed by: NURSE PRACTITIONER

## 2021-05-05 PROCEDURE — 97535 SELF CARE MNGMENT TRAINING: CPT

## 2021-05-05 PROCEDURE — 87901 NFCT AGT GNTYP ALYS HIV1 REV: CPT | Performed by: STUDENT IN AN ORGANIZED HEALTH CARE EDUCATION/TRAINING PROGRAM

## 2021-05-05 PROCEDURE — 63600175 PHARM REV CODE 636 W HCPCS: Performed by: NURSE PRACTITIONER

## 2021-05-05 PROCEDURE — 25000003 PHARM REV CODE 250: Performed by: NURSE PRACTITIONER

## 2021-05-05 PROCEDURE — S4991 NICOTINE PATCH NONLEGEND: HCPCS | Performed by: HOSPITALIST

## 2021-05-05 PROCEDURE — 87536 HIV-1 QUANT&REVRSE TRNSCRPJ: CPT | Performed by: STUDENT IN AN ORGANIZED HEALTH CARE EDUCATION/TRAINING PROGRAM

## 2021-05-05 PROCEDURE — 25000003 PHARM REV CODE 250: Performed by: STUDENT IN AN ORGANIZED HEALTH CARE EDUCATION/TRAINING PROGRAM

## 2021-05-05 PROCEDURE — 25000003 PHARM REV CODE 250: Performed by: HOSPITALIST

## 2021-05-05 PROCEDURE — 83735 ASSAY OF MAGNESIUM: CPT | Performed by: NURSE PRACTITIONER

## 2021-05-05 PROCEDURE — 99233 PR SUBSEQUENT HOSPITAL CARE,LEVL III: ICD-10-PCS | Mod: ,,, | Performed by: PSYCHIATRY & NEUROLOGY

## 2021-05-05 RX ORDER — ATOVAQUONE 750 MG/5ML
1500 SUSPENSION ORAL DAILY
Qty: 100 ML | Refills: 0
Start: 2021-05-06 | End: 2021-05-16

## 2021-05-05 RX ORDER — CLOPIDOGREL BISULFATE 75 MG/1
75 TABLET ORAL DAILY
Qty: 30 TABLET | Refills: 11
Start: 2021-05-06 | End: 2021-05-18 | Stop reason: SDUPTHER

## 2021-05-05 RX ORDER — ATORVASTATIN CALCIUM 40 MG/1
40 TABLET, FILM COATED ORAL NIGHTLY
Qty: 90 TABLET | Refills: 3
Start: 2021-05-05 | End: 2021-05-18 | Stop reason: SDUPTHER

## 2021-05-05 RX ADMIN — SODIUM CHLORIDE: 0.9 INJECTION, SOLUTION INTRAVENOUS at 12:05

## 2021-05-05 RX ADMIN — ASPIRIN 81 MG: 81 TABLET, COATED ORAL at 09:05

## 2021-05-05 RX ADMIN — DOCUSATE SODIUM 50MG AND SENNOSIDES 8.6MG 1 TABLET: 8.6; 5 TABLET, FILM COATED ORAL at 09:05

## 2021-05-05 RX ADMIN — NICOTINE 1 PATCH: 21 PATCH, EXTENDED RELEASE TRANSDERMAL at 09:05

## 2021-05-05 RX ADMIN — HYDROCHLOROTHIAZIDE 25 MG: 25 TABLET ORAL at 09:05

## 2021-05-05 RX ADMIN — POLYETHYLENE GLYCOL 3350 17 G: 17 POWDER, FOR SOLUTION ORAL at 09:05

## 2021-05-05 RX ADMIN — LISINOPRIL 10 MG: 5 TABLET ORAL at 09:05

## 2021-05-05 RX ADMIN — CLOPIDOGREL 75 MG: 75 TABLET, FILM COATED ORAL at 09:05

## 2021-05-05 RX ADMIN — FOLIC ACID 1 MG: 1 TABLET ORAL at 09:05

## 2021-05-05 RX ADMIN — ATOVAQUONE 1500 MG: 750 SUSPENSION ORAL at 09:05

## 2021-05-05 RX ADMIN — FERROUS SULFATE TAB EC 325 MG (65 MG FE EQUIVALENT) 325 MG: 325 (65 FE) TABLET DELAYED RESPONSE at 09:05

## 2021-05-05 RX ADMIN — HEPARIN SODIUM 5000 UNITS: 5000 INJECTION INTRAVENOUS; SUBCUTANEOUS at 06:05

## 2021-05-05 RX ADMIN — HEPARIN SODIUM 5000 UNITS: 5000 INJECTION INTRAVENOUS; SUBCUTANEOUS at 02:05

## 2021-05-06 LAB
HIV1 RNA # PLAS NAA DL=20: 271 COPIES/ML
PATHOLOGIST INTERPRETATION IFE: NORMAL
PATHOLOGIST INTERPRETATION SPE: NORMAL
PATHOLOGIST INTERPRETATION UIFE: NORMAL
PROT PATTERN UR ELPH-IMP: NORMAL

## 2021-05-07 LAB — PATHOLOGIST INTERPRETATION UPE: NORMAL

## 2021-05-10 LAB
PARVOVIRUS B19 ABS IGG & IGM: ABNORMAL
PARVOVIRUS B19 IGG ANTIBODY: POSITIVE
PARVOVIRUS B19 IGM ANTIBODY: NEGATIVE

## 2021-05-11 ENCOUNTER — TELEPHONE (OUTPATIENT)
Dept: SMOKING CESSATION | Facility: CLINIC | Age: 61
End: 2021-05-11

## 2021-05-11 ENCOUNTER — TELEPHONE (OUTPATIENT)
Dept: NEUROLOGY | Facility: CLINIC | Age: 61
End: 2021-05-11

## 2021-05-12 LAB
T GONDII IGG SER QL IA: NORMAL
T GONDII IGG SERPL IA-ACNC: <5 IU/ML (ref 0–6.4)

## 2021-05-14 LAB — HIV GENTYP ISLT: DETECTED

## 2021-05-18 ENCOUNTER — TELEPHONE (OUTPATIENT)
Dept: INFECTIOUS DISEASES | Facility: HOSPITAL | Age: 61
End: 2021-05-18

## 2021-12-08 PROBLEM — D64.9 ANEMIA: Status: RESOLVED | Noted: 2021-05-03 | Resolved: 2021-01-01

## 2021-12-08 PROBLEM — E87.1 HYPONATREMIA: Status: RESOLVED | Noted: 2021-05-03 | Resolved: 2021-01-01

## 2021-12-08 PROBLEM — D69.6 THROMBOCYTOPENIA: Status: ACTIVE | Noted: 2021-01-01
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
## 2021-12-08 PROBLEM — N18.9 CKD (CHRONIC KIDNEY DISEASE): Status: ACTIVE | Noted: 2021-01-01

## 2021-12-09 PROBLEM — E55.9 VITAMIN D DEFICIENCY: Status: ACTIVE | Noted: 2021-01-01

## 2021-12-09 PROBLEM — N18.30 STAGE 3 CHRONIC KIDNEY DISEASE: Status: ACTIVE | Noted: 2021-01-01

## 2022-01-01 ENCOUNTER — HOSPITAL ENCOUNTER (EMERGENCY)
Facility: HOSPITAL | Age: 62
End: 2022-05-24
Attending: EMERGENCY MEDICINE
Payer: MEDICAID

## 2022-01-01 VITALS — BODY MASS INDEX: 22.59 KG/M2 | WEIGHT: 153 LBS

## 2022-01-01 DIAGNOSIS — I46.9 CARDIAC ARREST: Primary | ICD-10-CM

## 2022-01-01 LAB
CTP QC/QA: YES
SARS-COV-2 RDRP RESP QL NAA+PROBE: NEGATIVE

## 2022-01-01 PROCEDURE — U0002 COVID-19 LAB TEST NON-CDC: HCPCS | Performed by: EMERGENCY MEDICINE

## 2022-01-01 PROCEDURE — 99282 EMERGENCY DEPT VISIT SF MDM: CPT

## 2022-05-24 NOTE — ED NOTES
[facility] to be [facility] to family. Family members have no set  home at this time.  is Osorio Pederson at [phone]. Next of kin would be mother Teresa Cervantes at [phone].

## 2022-05-24 NOTE — ED PROVIDER NOTES
Encounter Date: 5/24/2022       History     Chief Complaint   Patient presents with    Cardiac Arrest     Arrived per EMS, found down on porch, CPR PTA for approx 45 mins, 6 epi, 1 bicarb, narcan given by EMS, pt intubated with gonzalez and C-collar in place, asystole entire time per EMS on monitor     61 y.o. male with COPD, CKD, HIV, HTN, history of stroke presents via EMS for cardiac arrest.  EMS reports patient was in his front porch and had been down for 15 minutes for CPR was initiated on arrival.  Patient received 6 rounds of epi, bicarb, Narcan prior to arrival.  CPR was performed for approximately 30 minutes prior to arrival.  Patient was intubated on scene.  There was some diffuse muscle stiffness per EMS.  There was asystole on monitor.  Family, patient was ambulatory had no symptoms prior to the event.  There is no reported recent illness, noting patient does have history of stroke, COPD, CKD    The history is provided by the patient and medical records.     Review of patient's allergies indicates:   Allergen Reactions    Penicillins Hives    Nsaids (non-steroidal anti-inflammatory drug)      CKD    Penicillin     Sustiva [efavirenz] Other (See Comments)     insomnia     Past Medical History:   Diagnosis Date    Arthritis     Back pain     Chronic kidney disease, stage 3     COPD (chronic obstructive pulmonary disease)     Esophageal reflux     Glaucoma     HIV (human immunodeficiency virus infection)     2006    Hypertension     Lumbago     Psoriasis     Skin disorder     Stroke     Tobacco use     Vitamin D deficiency 12/9/2021     Past Surgical History:   Procedure Laterality Date    FINGER SURGERY      TONSILLECTOMY       Family History   Problem Relation Age of Onset    Arthritis Mother     Hypertension Mother     Hypertension Sister     Arthritis Sister     No Known Problems Father      Social History     Tobacco Use    Smoking status: Current Every Day Smoker      Packs/day: 0.50     Years: 49.00     Pack years: 24.50     Types: Cigarettes     Start date: 1972    Smokeless tobacco: Never Used    Tobacco comment: Pt enrolled in the [employer] on 5/1/14 ([facility] Member ID # [id number]). Ambulatory referral to Smoking Cessation.   Substance Use Topics    Alcohol use: Yes     Alcohol/week: 0.0 standard drinks     Comment: occassional beer; pt drank beer this am    Drug use: Yes     Types: Marijuana     Comment: occasional use of marijuana     Review of Systems   Unable to perform ROS: Patient unresponsive       Physical Exam     Initial Vitals   BP Pulse Resp Temp SpO2   -- -- -- -- --      MAP       --         Physical Exam    Constitutional: Pulses: No pulses palpable. Airway: Endotracheal Tube present. Level of Consciousness: Unresponsive.   HENT:   Head: No head trauma present.   Eyes: Pupils: Fixed and Dilated.   Cardiovascular: CPR in Progress. Heart Sounds: None. There is Douglas device in place.  Pulmonary/Chest: Respirations: None. Ventilations: Bag-Valve-Tube.     Neurological: Unresponsive to stimuli.         ED Course   Procedures  Labs Reviewed - No data to display       Imaging Results    None          Medications - No data to display  Medical Decision Making:   History:   I obtained history from: EMS provider and someone other than patient.  Old Medical Records: I decided to obtain old medical records.  Old Records Summarized: records from clinic visits and records from previous admission(s).  Initial Assessment:   61 y.o. male with COPD, CKD, HIV, HTN, history of stroke presents via EMS for cardiac arrest  ED Management:  Patient with cardiac arrest, found pulseless and apneic, CPR was initiated and performed for 30 minutes prior to arrival with asystole on the monitor, 6 doses of epi given, no shocks administered. ROSC was not achieved PTA  On arrival:  Asystole on the monitor, airway intact, endotracheally intubated.  In context of being down for 15 minutes  without circulation, muscle rigidity, no response to ACLS prior to arrival, after initial ED evaluation, efforts were determined to be futile and discontinued, patient declared dead  Medical history includes COPD, CKD, history of stroke, HIV, HTN, unclear cause of arrest,arrhythmia, metabolic derangement, hypoxic arrest, MI  Disclosed patient's status to family in ED   available for family support                      Clinical Impression:   Final diagnoses:  [I46.9] Cardiac arrest (Primary)          ED Disposition Condition                   Anam Allen MD  Resident  [phone]

## 2022-05-24 NOTE — ED NOTES
Dr. Hurley at bedside to evaluate patient at time of arrival. Patient placed on monitor. No cardiac activity noted. No palpable pulses. Pupils are fixed and equal. No head trauma noted. No obvious trauma to body.

## 2022-05-24 NOTE — ED TRIAGE NOTES
Patient arrived to ED by EMS with CPR enroute, CPR initiated approx 45-50 mins prior to arrival. Patient received 6 epi, 1 bicarb, and and narcan per EMS.  per EMS. Patient is intubated at this time being bagged with ambu to ETT. ET gonzalez and C-collar in place. Douglas on patient. Patient was reported to have been on porch having a conversation with neighbor at time of going unresponsive. No trauma noted. Patient has dried blood to right nares noted per EMS nose was bleeding at time of arrival. Asystole on the monitor with no palpable pulses.